# Patient Record
Sex: MALE | Race: WHITE | NOT HISPANIC OR LATINO | Employment: UNEMPLOYED | URBAN - NONMETROPOLITAN AREA
[De-identification: names, ages, dates, MRNs, and addresses within clinical notes are randomized per-mention and may not be internally consistent; named-entity substitution may affect disease eponyms.]

---

## 2017-01-08 ENCOUNTER — HOSPITAL ENCOUNTER (EMERGENCY)
Facility: HOSPITAL | Age: 47
Discharge: HOME | End: 2017-01-08
Admitting: EMERGENCY MEDICINE

## 2017-01-08 VITALS
HEIGHT: 70 IN | HEART RATE: 72 BPM | WEIGHT: 239 LBS | RESPIRATION RATE: 16 BRPM | OXYGEN SATURATION: 99 % | BODY MASS INDEX: 34.22 KG/M2 | SYSTOLIC BLOOD PRESSURE: 162 MMHG | DIASTOLIC BLOOD PRESSURE: 112 MMHG | TEMPERATURE: 98 F

## 2017-01-08 DIAGNOSIS — K08.89 PAIN, DENTAL: Primary | ICD-10-CM

## 2017-01-08 PROCEDURE — 96372 THER/PROPH/DIAG INJ SC/IM: CPT

## 2017-01-08 PROCEDURE — 99283 EMERGENCY DEPT VISIT LOW MDM: CPT

## 2017-01-08 PROCEDURE — 96374 THER/PROPH/DIAG INJ IV PUSH: CPT

## 2017-01-08 PROCEDURE — 25010000002 HYDROMORPHONE PER 4 MG: Performed by: NURSE PRACTITIONER

## 2017-01-08 RX ORDER — HYDROMORPHONE HCL 110MG/55ML
2 PATIENT CONTROLLED ANALGESIA SYRINGE INTRAVENOUS ONCE
Status: DISCONTINUED | OUTPATIENT
Start: 2017-01-08 | End: 2017-01-08

## 2017-01-08 RX ORDER — ONDANSETRON 4 MG/1
4 TABLET, ORALLY DISINTEGRATING ORAL ONCE
Status: COMPLETED | OUTPATIENT
Start: 2017-01-08 | End: 2017-01-08

## 2017-01-08 RX ORDER — IBUPROFEN 600 MG/1
600 TABLET ORAL EVERY 8 HOURS PRN
Qty: 9 TABLET | Refills: 0 | Status: SHIPPED | OUTPATIENT
Start: 2017-01-08 | End: 2017-01-11

## 2017-01-08 RX ORDER — PENICILLIN V POTASSIUM 500 MG/1
500 TABLET ORAL 4 TIMES DAILY
Qty: 40 TABLET | Refills: 0 | Status: SHIPPED | OUTPATIENT
Start: 2017-01-08 | End: 2017-01-18

## 2017-01-08 RX ADMIN — HYDROMORPHONE HYDROCHLORIDE 1 MG: 2 INJECTION, SOLUTION INTRAMUSCULAR; INTRAVENOUS; SUBCUTANEOUS at 14:40

## 2017-01-08 RX ADMIN — ONDANSETRON 4 MG: 4 TABLET, ORALLY DISINTEGRATING ORAL at 14:40

## 2017-01-08 NOTE — ED PROVIDER NOTES
Subjective   HPI Comments: She is a 46-year-old white male who presents ER today with complaint of dental pain.  Patient reports that he is scheduled to have all of his teeth pulled on 01/20/2017.  He states that today while at work a piece of his lower left tooth broke off when he was in an escalator.  Patient reports that he has been having quite a bit of pain since that time.  Patient has been seen in this ER twice in the past 2 weeks for the same complaint.  He was previously on chronic pain medications but no longer has a family physician can prescribe these.  He denies fever. Patient presents ER today for further evaluation.    Patient is a 46 y.o. male presenting with tooth pain.   History provided by:  Patient   used: No    Dental Pain   Location:  Lower  Lower teeth location:  21/LL 1st bicuspid  Quality:  Sharp and aching  Severity:  Moderate  Onset quality:  Sudden  Duration:  1 day  Timing:  Constant  Progression:  Worsening  Chronicity:  Recurrent  Context: dental fracture and poor dentition    Context: not abscess, cap still on, not crown fracture, not dental caries, not enamel fracture, filling intact, not intrusion, not malocclusion, not recent dental surgery and not trauma    Previous work-up:  Dental exam  Relieved by:  Nothing  Worsened by:  Nothing  Ineffective treatments:  None tried  Associated symptoms: no congestion, no difficulty swallowing, no drooling, no facial pain, no facial swelling, no fever, no gum swelling, no headaches, no neck pain, no neck swelling, no oral bleeding, no oral lesions and no trismus    Risk factors: lack of dental care    Risk factors: no alcohol problem, no cancer, no chewing tobacco use, no diabetes, no immunosuppression, no periodontal disease and no smoking        Review of Systems   Constitutional: Negative for fever.   HENT: Negative for congestion, drooling, facial swelling and mouth sores.    Musculoskeletal: Negative for neck pain.    Neurological: Negative for headaches.   All other systems reviewed and are negative.      Past Medical History   Diagnosis Date   • Hypertension    • Stroke        Allergies   Allergen Reactions   • Magnesium-Containing Compounds    • Toradol [Ketorolac Tromethamine]    • Tramadol        Past Surgical History   Procedure Laterality Date   • Neck surgery     • Back surgery         Family History   Problem Relation Age of Onset   • No Known Problems Mother    • No Known Problems Father    • No Known Problems Sister    • No Known Problems Brother    • No Known Problems Son    • No Known Problems Daughter    • No Known Problems Maternal Grandmother    • No Known Problems Maternal Grandfather    • No Known Problems Paternal Grandmother    • No Known Problems Paternal Grandfather    • No Known Problems Cousin    • Rheum arthritis Neg Hx    • Osteoarthritis Neg Hx    • Asthma Neg Hx    • Diabetes Neg Hx    • Heart failure Neg Hx    • Hyperlipidemia Neg Hx    • Hypertension Neg Hx    • Migraines Neg Hx    • Rashes / Skin problems Neg Hx    • Seizures Neg Hx    • Stroke Neg Hx    • Thyroid disease Neg Hx        Social History     Social History   • Marital status:      Spouse name: N/A   • Number of children: N/A   • Years of education: N/A     Social History Main Topics   • Smoking status: Current Every Day Smoker     Packs/day: 0.50   • Smokeless tobacco: None   • Alcohol use No   • Drug use: No   • Sexual activity: Defer     Other Topics Concern   • None     Social History Narrative           Objective   Physical Exam   Constitutional: He is oriented to person, place, and time. He appears well-developed.   HENT:   Head: Normocephalic and atraumatic.   Multiple dental caries noted throughout mouth  Broken tooth noted to tooth #21  Poor denitition noted on exam   Eyes: Conjunctivae are normal. Pupils are equal, round, and reactive to light.   Neck: Normal range of motion. Neck supple.   Cardiovascular: Normal  rate, regular rhythm and normal heart sounds.    Pulmonary/Chest: Effort normal and breath sounds normal.   Abdominal: Soft. Bowel sounds are normal.   Musculoskeletal: Normal range of motion.   Neurological: He is alert and oriented to person, place, and time.   Skin: Skin is warm and dry.   Psychiatric: He has a normal mood and affect.   Nursing note and vitals reviewed.      Procedures         ED Course  ED Course   Comment By Time   We will treat patient with a pain injection today.  He will be given a prescription for penicillin to take at home until he can follow with his dentist.  Lites follow up with dentist in the morning.  Patient is advised that the ER cannot fill chronic pain medication prescriptions.  He will be discharged home with a prescription for ibuprofen which she has previously tolerated in the past.  Patient is advised to return to ER if any new or worsening symptoms.  Discharged home with his wife at this time in stable cond Ligia De Leon, ROSE MARIE 01/08 1442                  MDM  Number of Diagnoses or Management Options  Pain, dental: new and requires workup  Patient Progress  Patient progress: stable      Final diagnoses:   Pain, dental            Ligia De Leon, ROSE MARIE  01/08/17 6062

## 2017-01-08 NOTE — DISCHARGE INSTRUCTIONS
Please f/u with your dentist as scheduled; please call your dentist tomorrow to see if you can get an appt next week  R/t to the ER as needed, sooner if any new or further symptoms  Use medication as prescribed

## 2017-01-20 ENCOUNTER — HOSPITAL ENCOUNTER (EMERGENCY)
Age: 47
Discharge: HOME | End: 2017-01-20
Payer: MEDICAID

## 2017-01-20 VITALS
OXYGEN SATURATION: 99 % | RESPIRATION RATE: 18 BRPM | SYSTOLIC BLOOD PRESSURE: 188 MMHG | HEART RATE: 89 BPM | TEMPERATURE: 97.1 F | DIASTOLIC BLOOD PRESSURE: 119 MMHG

## 2017-01-20 DIAGNOSIS — S02.5XXB OPEN FRACTURE OF TOOTH, INITIAL ENCOUNTER: ICD-10-CM

## 2017-01-20 DIAGNOSIS — K02.9 DENTAL CARIES: ICD-10-CM

## 2017-01-20 DIAGNOSIS — K08.89 PAIN, DENTAL: Primary | ICD-10-CM

## 2017-01-20 PROCEDURE — 99282 EMERGENCY DEPT VISIT SF MDM: CPT | Performed by: PHYSICIAN ASSISTANT

## 2017-01-20 PROCEDURE — 6370000000 HC RX 637 (ALT 250 FOR IP): Performed by: PHYSICIAN ASSISTANT

## 2017-01-20 PROCEDURE — 99282 EMERGENCY DEPT VISIT SF MDM: CPT

## 2017-01-20 RX ORDER — HYDROCODONE BITARTRATE AND ACETAMINOPHEN 7.5; 325 MG/1; MG/1
1 TABLET ORAL ONCE
Status: COMPLETED | OUTPATIENT
Start: 2017-01-20 | End: 2017-01-20

## 2017-01-20 RX ORDER — PENICILLIN V POTASSIUM 500 MG/1
500 TABLET ORAL 4 TIMES DAILY
Qty: 40 TABLET | Refills: 0 | Status: SHIPPED | OUTPATIENT
Start: 2017-01-20 | End: 2017-01-30

## 2017-01-20 RX ADMIN — HYDROCODONE BITARTRATE AND ACETAMINOPHEN 1 TABLET: 7.5; 325 TABLET ORAL at 08:06

## 2017-01-20 ASSESSMENT — PAIN DESCRIPTION - DESCRIPTORS: DESCRIPTORS: DULL;SHARP

## 2017-01-20 ASSESSMENT — PAIN SCALES - GENERAL
PAINLEVEL_OUTOF10: 10
PAINLEVEL_OUTOF10: 10

## 2017-01-20 ASSESSMENT — PAIN DESCRIPTION - LOCATION: LOCATION: MOUTH

## 2017-01-20 ASSESSMENT — PAIN DESCRIPTION - PAIN TYPE: TYPE: ACUTE PAIN

## 2017-01-22 ENCOUNTER — HOSPITAL ENCOUNTER (EMERGENCY)
Age: 47
Discharge: HOME | End: 2017-01-22
Attending: EMERGENCY MEDICINE
Payer: MEDICAID

## 2017-01-22 VITALS
RESPIRATION RATE: 24 BRPM | DIASTOLIC BLOOD PRESSURE: 98 MMHG | TEMPERATURE: 97.7 F | BODY MASS INDEX: 33.32 KG/M2 | HEIGHT: 71 IN | WEIGHT: 238 LBS | HEART RATE: 74 BPM | OXYGEN SATURATION: 98 % | SYSTOLIC BLOOD PRESSURE: 170 MMHG

## 2017-01-22 DIAGNOSIS — K02.9 DENTAL CARIES: Primary | ICD-10-CM

## 2017-01-22 PROCEDURE — 99282 EMERGENCY DEPT VISIT SF MDM: CPT | Performed by: EMERGENCY MEDICINE

## 2017-01-22 PROCEDURE — 99282 EMERGENCY DEPT VISIT SF MDM: CPT

## 2017-01-22 PROCEDURE — 6370000000 HC RX 637 (ALT 250 FOR IP): Performed by: EMERGENCY MEDICINE

## 2017-01-22 RX ORDER — NAPROXEN 500 MG/1
500 TABLET ORAL 2 TIMES DAILY
Qty: 15 TABLET | Refills: 0 | Status: SHIPPED | OUTPATIENT
Start: 2017-01-22

## 2017-01-22 RX ORDER — HYDROCODONE BITARTRATE AND ACETAMINOPHEN 5; 325 MG/1; MG/1
1 TABLET ORAL ONCE
Status: COMPLETED | OUTPATIENT
Start: 2017-01-22 | End: 2017-01-22

## 2017-01-22 RX ADMIN — HYDROCODONE BITARTRATE AND ACETAMINOPHEN 1 TABLET: 5; 325 TABLET ORAL at 08:50

## 2017-01-22 ASSESSMENT — ENCOUNTER SYMPTOMS
SHORTNESS OF BREATH: 0
DIARRHEA: 0
VOMITING: 0
NAUSEA: 0
RHINORRHEA: 0
ABDOMINAL PAIN: 0
SORE THROAT: 0
BACK PAIN: 0

## 2017-01-22 ASSESSMENT — PAIN SCALES - GENERAL
PAINLEVEL_OUTOF10: 9
PAINLEVEL_OUTOF10: 9

## 2017-10-10 ENCOUNTER — HOSPITAL ENCOUNTER (EMERGENCY)
Facility: HOSPITAL | Age: 47
Discharge: HOME | End: 2017-10-10
Admitting: EMERGENCY MEDICINE

## 2017-10-10 ENCOUNTER — APPOINTMENT (OUTPATIENT)
Dept: ULTRASOUND IMAGING | Facility: HOSPITAL | Age: 47
End: 2017-10-10

## 2017-10-10 VITALS
OXYGEN SATURATION: 99 % | BODY MASS INDEX: 35.79 KG/M2 | TEMPERATURE: 98.7 F | RESPIRATION RATE: 14 BRPM | DIASTOLIC BLOOD PRESSURE: 115 MMHG | HEART RATE: 92 BPM | HEIGHT: 70 IN | SYSTOLIC BLOOD PRESSURE: 159 MMHG | WEIGHT: 250 LBS

## 2017-10-10 DIAGNOSIS — L03.116 CELLULITIS OF LEFT LOWER EXTREMITY: Primary | ICD-10-CM

## 2017-10-10 LAB
ALBUMIN SERPL-MCNC: 4.6 G/DL (ref 3.5–5)
ALBUMIN/GLOB SERPL: 1.4 G/DL (ref 1.1–2.5)
ALP SERPL-CCNC: 84 U/L (ref 24–120)
ALT SERPL W P-5'-P-CCNC: 41 U/L (ref 0–54)
ANION GAP SERPL CALCULATED.3IONS-SCNC: 15 MMOL/L (ref 4–13)
AST SERPL-CCNC: 30 U/L (ref 7–45)
BASOPHILS # BLD AUTO: 0.06 10*3/MM3 (ref 0–0.2)
BASOPHILS NFR BLD AUTO: 0.9 % (ref 0–2)
BILIRUB SERPL-MCNC: 0.8 MG/DL (ref 0.1–1)
BUN BLD-MCNC: 20 MG/DL (ref 5–21)
BUN/CREAT SERPL: 19.2 (ref 7–25)
CALCIUM SPEC-SCNC: 9 MG/DL (ref 8.4–10.4)
CHLORIDE SERPL-SCNC: 103 MMOL/L (ref 98–110)
CO2 SERPL-SCNC: 27 MMOL/L (ref 24–31)
CREAT BLD-MCNC: 1.04 MG/DL (ref 0.5–1.4)
D-LACTATE SERPL-SCNC: 1.9 MMOL/L (ref 0.5–2)
DEPRECATED RDW RBC AUTO: 42.8 FL (ref 40–54)
EOSINOPHIL # BLD AUTO: 0.36 10*3/MM3 (ref 0–0.7)
EOSINOPHIL NFR BLD AUTO: 5.2 % (ref 0–4)
ERYTHROCYTE [DISTWIDTH] IN BLOOD BY AUTOMATED COUNT: 13.3 % (ref 12–15)
GFR SERPL CREATININE-BSD FRML MDRD: 77 ML/MIN/1.73
GLOBULIN UR ELPH-MCNC: 3.4 GM/DL
GLUCOSE BLD-MCNC: 120 MG/DL (ref 70–100)
HCT VFR BLD AUTO: 51.7 % (ref 40–52)
HGB BLD-MCNC: 18.1 G/DL (ref 14–18)
IMM GRANULOCYTES # BLD: 0.02 10*3/MM3 (ref 0–0.03)
IMM GRANULOCYTES NFR BLD: 0.3 % (ref 0–5)
LYMPHOCYTES # BLD AUTO: 1.72 10*3/MM3 (ref 0.72–4.86)
LYMPHOCYTES NFR BLD AUTO: 24.9 % (ref 15–45)
MCH RBC QN AUTO: 30.9 PG (ref 28–32)
MCHC RBC AUTO-ENTMCNC: 35 G/DL (ref 33–36)
MCV RBC AUTO: 88.2 FL (ref 82–95)
MONOCYTES # BLD AUTO: 0.82 10*3/MM3 (ref 0.19–1.3)
MONOCYTES NFR BLD AUTO: 11.8 % (ref 4–12)
NEUTROPHILS # BLD AUTO: 3.94 10*3/MM3 (ref 1.87–8.4)
NEUTROPHILS NFR BLD AUTO: 56.9 % (ref 39–78)
PLATELET # BLD AUTO: 211 10*3/MM3 (ref 130–400)
PMV BLD AUTO: 10.5 FL (ref 6–12)
POTASSIUM BLD-SCNC: 4 MMOL/L (ref 3.5–5.3)
PROT SERPL-MCNC: 8 G/DL (ref 6.3–8.7)
RBC # BLD AUTO: 5.86 10*6/MM3 (ref 4.8–5.9)
SODIUM BLD-SCNC: 145 MMOL/L (ref 135–145)
WBC NRBC COR # BLD: 6.92 10*3/MM3 (ref 4.8–10.8)

## 2017-10-10 PROCEDURE — 93971 EXTREMITY STUDY: CPT

## 2017-10-10 PROCEDURE — 83605 ASSAY OF LACTIC ACID: CPT | Performed by: PHYSICIAN ASSISTANT

## 2017-10-10 PROCEDURE — 99283 EMERGENCY DEPT VISIT LOW MDM: CPT

## 2017-10-10 PROCEDURE — 80053 COMPREHEN METABOLIC PANEL: CPT | Performed by: PHYSICIAN ASSISTANT

## 2017-10-10 PROCEDURE — 85025 COMPLETE CBC W/AUTO DIFF WBC: CPT | Performed by: PHYSICIAN ASSISTANT

## 2017-10-10 PROCEDURE — 96365 THER/PROPH/DIAG IV INF INIT: CPT

## 2017-10-10 PROCEDURE — 87040 BLOOD CULTURE FOR BACTERIA: CPT | Performed by: PHYSICIAN ASSISTANT

## 2017-10-10 RX ORDER — DICLOFENAC SODIUM 75 MG/1
75 TABLET, DELAYED RELEASE ORAL 2 TIMES DAILY
Qty: 14 TABLET | Refills: 0 | Status: SHIPPED | OUTPATIENT
Start: 2017-10-10 | End: 2021-09-02

## 2017-10-10 RX ORDER — CLINDAMYCIN PHOSPHATE 600 MG/50ML
600 INJECTION INTRAVENOUS ONCE
Status: COMPLETED | OUTPATIENT
Start: 2017-10-10 | End: 2017-10-10

## 2017-10-10 RX ORDER — CLINDAMYCIN PHOSPHATE 150 MG/ML
600 INJECTION, SOLUTION INTRAVENOUS ONCE
Status: DISCONTINUED | OUTPATIENT
Start: 2017-10-10 | End: 2017-10-10

## 2017-10-10 RX ORDER — CEPHALEXIN 500 MG/1
500 CAPSULE ORAL 3 TIMES DAILY
Qty: 30 CAPSULE | Refills: 0 | Status: SHIPPED | OUTPATIENT
Start: 2017-10-10 | End: 2021-09-02

## 2017-10-10 RX ADMIN — CLINDAMYCIN PHOSPHATE 600 MG: 12 INJECTION, SOLUTION INTRAVENOUS at 16:37

## 2017-10-10 NOTE — ED NOTES
Spoke to pharmacy. States they just sent patients medication in the tube system.     Latasha Ambrosio RN  10/10/17 6281

## 2017-10-10 NOTE — ED PROVIDER NOTES
Subjective   History of Present Illness  47-year-old male presents chief complaint of left lower leg pain past 2 days.  The patient reports he was having a spider bite on his leg 2 days ago and has since experienced worsening redness and swelling and pain in his left lower leg.  Patient reports his pain clearly reproducible palpation and with bearing weight.  He reports there is also associated warmth from the leg.  Patient reports alleviating factors include rest.  Denies fevers chills chest pain shortness of breath nausea vomiting diarrhea.  He has not seen another provider for this yet.  Review of Systems   All other systems reviewed and are negative.      Past Medical History:   Diagnosis Date   • Hypertension    • Stroke        Allergies   Allergen Reactions   • Magnesium-Containing Compounds    • Toradol [Ketorolac Tromethamine]    • Tramadol        Past Surgical History:   Procedure Laterality Date   • BACK SURGERY     • NECK SURGERY         Family History   Problem Relation Age of Onset   • No Known Problems Mother    • No Known Problems Father    • No Known Problems Sister    • No Known Problems Brother    • No Known Problems Son    • No Known Problems Daughter    • No Known Problems Maternal Grandmother    • No Known Problems Maternal Grandfather    • No Known Problems Paternal Grandmother    • No Known Problems Paternal Grandfather    • No Known Problems Cousin    • Rheum arthritis Neg Hx    • Osteoarthritis Neg Hx    • Asthma Neg Hx    • Diabetes Neg Hx    • Heart failure Neg Hx    • Hyperlipidemia Neg Hx    • Hypertension Neg Hx    • Migraines Neg Hx    • Rashes / Skin problems Neg Hx    • Seizures Neg Hx    • Stroke Neg Hx    • Thyroid disease Neg Hx        Social History     Social History   • Marital status:      Spouse name: N/A   • Number of children: N/A   • Years of education: N/A     Social History Main Topics   • Smoking status: Current Every Day Smoker     Packs/day: 0.50   • Smokeless  tobacco: None   • Alcohol use No   • Drug use: No   • Sexual activity: Defer     Other Topics Concern   • None     Social History Narrative           Objective   Physical Exam   Constitutional: He is oriented to person, place, and time. He appears well-developed and well-nourished.   HENT:   Head: Normocephalic.   Eyes: Pupils are equal, round, and reactive to light.   Neck: Normal range of motion.   Cardiovascular: Normal rate and regular rhythm.    Pulmonary/Chest: Effort normal.   Abdominal: Soft.   Musculoskeletal: Normal range of motion.   Neurological: He is alert and oriented to person, place, and time.   Skin: Skin is warm.   16x16 area of cellulitis left lower leg   Psychiatric: He has a normal mood and affect. His behavior is normal.   Nursing note and vitals reviewed.      Procedures         ED Course  ED Course                  MDM    Final diagnoses:   Cellulitis of left lower extremity            Alejandro Haynes PA-C  10/10/17 3711

## 2017-10-15 LAB
BACTERIA SPEC AEROBE CULT: NORMAL
BACTERIA SPEC AEROBE CULT: NORMAL

## 2017-11-14 ENCOUNTER — HOSPITAL ENCOUNTER (EMERGENCY)
Facility: HOSPITAL | Age: 47
Discharge: HOME | End: 2017-11-14
Admitting: EMERGENCY MEDICINE

## 2017-11-14 ENCOUNTER — APPOINTMENT (OUTPATIENT)
Dept: GENERAL RADIOLOGY | Facility: HOSPITAL | Age: 47
End: 2017-11-14

## 2017-11-14 VITALS
RESPIRATION RATE: 18 BRPM | SYSTOLIC BLOOD PRESSURE: 150 MMHG | HEART RATE: 86 BPM | BODY MASS INDEX: 33.6 KG/M2 | DIASTOLIC BLOOD PRESSURE: 84 MMHG | HEIGHT: 71 IN | WEIGHT: 240 LBS | TEMPERATURE: 98.2 F | OXYGEN SATURATION: 98 %

## 2017-11-14 DIAGNOSIS — S61.412A LACERATION OF LEFT HAND WITHOUT FOREIGN BODY, INITIAL ENCOUNTER: Primary | ICD-10-CM

## 2017-11-14 PROCEDURE — 73130 X-RAY EXAM OF HAND: CPT

## 2017-11-14 PROCEDURE — 99283 EMERGENCY DEPT VISIT LOW MDM: CPT

## 2017-11-14 RX ORDER — LIDOCAINE HYDROCHLORIDE 10 MG/ML
10 INJECTION, SOLUTION INFILTRATION; PERINEURAL ONCE
Status: DISCONTINUED | OUTPATIENT
Start: 2017-11-14 | End: 2017-11-14 | Stop reason: HOSPADM

## 2017-11-14 NOTE — ED PROVIDER NOTES
Subjective   Patient is a 47 y.o. male presenting with skin laceration.   Laceration     Patient is a 47-year-old  man with chief complaint of left hand laceration.  The patient is right-hand dominant.  He was barehanded when this happened.  He was opening up a tool that was encased.  He is using a utility knife when it slipped and accidentally stabbed him in his left hand.  The patient believes that the depth of the blade Was about 2-3 cm.  He Denies Any Loss of Sensation or Motion.  He Denies Being Diabetic.  He Is up to date with his Tetanus Vaccination.  He Denies Any Other Injury.  He came to the ER immediately.    Review of Systems   All other systems reviewed and are negative.      Past Medical History:   Diagnosis Date   • Hypertension    • Stroke        Allergies   Allergen Reactions   • Magnesium-Containing Compounds    • Toradol [Ketorolac Tromethamine]    • Tramadol        Past Surgical History:   Procedure Laterality Date   • BACK SURGERY     • NECK SURGERY         Family History   Problem Relation Age of Onset   • No Known Problems Mother    • No Known Problems Father    • No Known Problems Sister    • No Known Problems Brother    • No Known Problems Son    • No Known Problems Daughter    • No Known Problems Maternal Grandmother    • No Known Problems Maternal Grandfather    • No Known Problems Paternal Grandmother    • No Known Problems Paternal Grandfather    • No Known Problems Cousin    • Rheum arthritis Neg Hx    • Osteoarthritis Neg Hx    • Asthma Neg Hx    • Diabetes Neg Hx    • Heart failure Neg Hx    • Hyperlipidemia Neg Hx    • Hypertension Neg Hx    • Migraines Neg Hx    • Rashes / Skin problems Neg Hx    • Seizures Neg Hx    • Stroke Neg Hx    • Thyroid disease Neg Hx        Social History     Social History   • Marital status:      Spouse name: N/A   • Number of children: N/A   • Years of education: N/A     Social History Main Topics   • Smoking status: Current Every Day  Smoker     Packs/day: 0.50   • Smokeless tobacco: Not on file   • Alcohol use No   • Drug use: No   • Sexual activity: Defer     Other Topics Concern   • Not on file     Social History Narrative       Prior to Admission medications    Medication Sig Start Date End Date Taking? Authorizing Provider   aspirin 81 MG EC tablet Take 81 mg by mouth Daily.    Historical Provider, MD   cephalexin (KEFLEX) 500 MG capsule Take 1 capsule by mouth 3 (Three) Times a Day. 10/10/17   Alejandro Haynes PA-C   diclofenac (VOLTAREN) 75 MG EC tablet Take 1 tablet by mouth 2 (Two) Times a Day. 10/10/17   Alejandro Haynes PA-C   gentamicin (GARAMYCIN) 0.3 % ophthalmic solution Administer 1 drop into the left eye every 4 (four) hours. 9/23/16   Violeta March DO   HYDROcodone-acetaminophen (NORCO) 5-325 MG per tablet Take 1 tablet by mouth every 4 (four) hours as needed for moderate pain (4-6) for up to 10 doses. 9/23/16   Vaibhav Resendez MD   lisinopril (PRINIVIL,ZESTRIL) 20 MG tablet Take 40 mg by mouth daily     Historical Provider, MD   MethylPREDNISolone (MEDROL, MARIA T,) 4 MG tablet Take as directed on package instructions. 11/6/16   ROSE MARIE More   ondansetron (ZOFRAN) 4 MG tablet Take 1 tablet by mouth every 4 (four) hours as needed for nausea or vomiting. 9/23/16   Vaibhav Resendez MD   oxyCODONE-acetaminophen (PERCOCET)  MG per tablet Take 1 tablet by mouth Every 6 (Six) Hours As Needed for moderate pain (4-6). 12/13/16   Jolynn Jonas MD   oxyCODONE-acetaminophen (PERCOCET) 7.5-325 MG per tablet Take 1 tablet by mouth Every 4 (Four) Hours As Needed for moderate pain (4-6). 11/6/16   ROSE MARIE More   sulfamethoxazole-trimethoprim (BACTRIM DS,SEPTRA DS) 800-160 MG per tablet Take 1 tablet by mouth 2 (Two) Times a Day. 12/27/16   Boyd Cornejo Jr., MD   tiZANidine (ZANAFLEX) 4 MG tablet Take 1 tablet by mouth At Night As Needed for muscle spasms. 11/6/16   Amy Rivera  "Romeo, APRN       Medications   lidocaine (XYLOCAINE) 1 % injection 10 mL (not administered)       /84  Pulse 86  Resp 18  Ht 71\" (180.3 cm)  Wt 240 lb (109 kg)  SpO2 98%  BMI 33.47 kg/m2      Objective   Physical Exam   Constitutional: He is oriented to person, place, and time. He appears well-developed and well-nourished.  Non-toxic appearance. No distress.   HENT:   Head: Normocephalic and atraumatic.   Nose: Nose normal.   Mouth/Throat: Uvula is midline, oropharynx is clear and moist and mucous membranes are normal.   Eyes: Conjunctivae, EOM and lids are normal. Pupils are equal, round, and reactive to light. Lids are everted and swept, no foreign bodies found.   Neck: Trachea normal, normal range of motion, full passive range of motion without pain and phonation normal. Neck supple. Normal carotid pulses and no JVD present. Carotid bruit is not present. No rigidity.   Cardiovascular: Normal rate, regular rhythm, normal heart sounds, intact distal pulses and normal pulses.    Pulmonary/Chest: Effort normal and breath sounds normal. No stridor. No apnea and no tachypnea. No respiratory distress.   Abdominal: Soft. Normal appearance, normal aorta and bowel sounds are normal. There is no hepatosplenomegaly. There is no tenderness. No hernia.   Musculoskeletal: He exhibits tenderness and deformity. He exhibits no edema.   Small 1cm laceration at the dorsal aspect of proximal left 5th metacarpal site. Patient has full ROM and normal sensation.        Vascular Status -  His exam exhibits right foot vasculature normal. His exam exhibits left foot vasculature normal.  Neurological: He is alert and oriented to person, place, and time. He has normal strength and normal reflexes. He displays normal reflexes. No cranial nerve deficit or sensory deficit. Coordination and gait normal. GCS eye subscore is 4. GCS verbal subscore is 5. GCS motor subscore is 6.   Skin: Skin is warm, dry and intact. He is not " diaphoretic. No pallor.   Psychiatric: He has a normal mood and affect. His speech is normal and behavior is normal.   Nursing note and vitals reviewed.      Laceration Repair  Date/Time: 11/14/2017 5:36 PM  Performed by: TESHA FAY  Authorized by: TESHA FAY     Consent:     Consent obtained:  Verbal    Consent given by:  Patient  Anesthesia (see MAR for exact dosages):     Anesthesia method:  Local infiltration    Local anesthetic:  Lidocaine 1% w/o epi  Laceration details:     Location:  Hand    Hand location:  L hand, dorsum    Length (cm):  1    Depth (mm):  3  Repair type:     Repair type:  Simple  Pre-procedure details:     Preparation:  Patient was prepped and draped in usual sterile fashion and imaging obtained to evaluate for foreign bodies  Exploration:     Hemostasis achieved with:  Direct pressure    Wound exploration: wound explored through full range of motion and entire depth of wound probed and visualized      Wound extent: no areolar tissue violation noted, no fascia violation noted, no foreign bodies/material noted, no muscle damage noted, no nerve damage noted, no tendon damage noted, no underlying fracture noted and no vascular damage noted      Contaminated: no    Treatment:     Area cleansed with:  Betadine    Amount of cleaning:  Standard    Irrigation solution:  Sterile saline    Irrigation method:  Syringe    Visualized foreign bodies/material removed: no    Skin repair:     Repair method:  Sutures    Suture size:  5-0    Suture material:  Nylon    Suture technique:  Simple interrupted    Number of sutures:  3  Approximation:     Approximation:  Close    Vermilion border: well-aligned    Post-procedure details:     Dressing:  Antibiotic ointment    Patient tolerance of procedure:  Tolerated well, no immediate complications             Lab Results (last 24 hours)     ** No results found for the last 24 hours. **          No results found.    ED Course  ED Course           MDM    Final diagnoses:   Laceration of left hand without foreign body, initial encounter          betsy-RADHAMES Reid  11/14/17 5794

## 2021-09-02 ENCOUNTER — OFFICE VISIT (OUTPATIENT)
Dept: FAMILY MEDICINE CLINIC | Facility: CLINIC | Age: 51
End: 2021-09-02

## 2021-09-02 VITALS
HEIGHT: 71 IN | DIASTOLIC BLOOD PRESSURE: 110 MMHG | WEIGHT: 258.4 LBS | BODY MASS INDEX: 36.18 KG/M2 | TEMPERATURE: 97 F | SYSTOLIC BLOOD PRESSURE: 188 MMHG | RESPIRATION RATE: 16 BRPM | HEART RATE: 110 BPM | OXYGEN SATURATION: 97 %

## 2021-09-02 DIAGNOSIS — F17.200 SMOKER: ICD-10-CM

## 2021-09-02 DIAGNOSIS — E66.01 CLASS 2 SEVERE OBESITY DUE TO EXCESS CALORIES WITH SERIOUS COMORBIDITY AND BODY MASS INDEX (BMI) OF 36.0 TO 36.9 IN ADULT (HCC): ICD-10-CM

## 2021-09-02 DIAGNOSIS — Z12.5 PROSTATE CANCER SCREENING: ICD-10-CM

## 2021-09-02 DIAGNOSIS — Z86.73 HISTORY OF CVA (CEREBROVASCULAR ACCIDENT): ICD-10-CM

## 2021-09-02 DIAGNOSIS — Z11.4 SCREENING FOR HIV (HUMAN IMMUNODEFICIENCY VIRUS): ICD-10-CM

## 2021-09-02 DIAGNOSIS — I10 ESSENTIAL HYPERTENSION: Primary | ICD-10-CM

## 2021-09-02 DIAGNOSIS — Z11.59 NEED FOR HEPATITIS C SCREENING TEST: ICD-10-CM

## 2021-09-02 DIAGNOSIS — Z13.220 LIPID SCREENING: ICD-10-CM

## 2021-09-02 PROCEDURE — 99204 OFFICE O/P NEW MOD 45 MIN: CPT | Performed by: FAMILY MEDICINE

## 2021-09-02 RX ORDER — AMLODIPINE BESYLATE 5 MG/1
5 TABLET ORAL DAILY
Qty: 30 TABLET | Refills: 2 | Status: SHIPPED | OUTPATIENT
Start: 2021-09-02 | End: 2021-09-16

## 2021-09-02 RX ORDER — LISINOPRIL 40 MG/1
40 TABLET ORAL DAILY
Qty: 30 TABLET | Refills: 2 | Status: SHIPPED | OUTPATIENT
Start: 2021-09-02 | End: 2021-11-01 | Stop reason: SDUPTHER

## 2021-09-02 RX ORDER — CLONIDINE HYDROCHLORIDE 0.2 MG/1
0.2 TABLET ORAL 2 TIMES DAILY
COMMUNITY
End: 2021-09-02

## 2021-09-02 RX ORDER — HYDROCHLOROTHIAZIDE 12.5 MG/1
12.5 TABLET ORAL DAILY
Qty: 30 TABLET | Refills: 2 | Status: SHIPPED | OUTPATIENT
Start: 2021-09-02 | End: 2021-09-16 | Stop reason: SDUPTHER

## 2021-09-02 NOTE — PATIENT INSTRUCTIONS
"Hypertension, Adult  High blood pressure (hypertension) is when the force of blood pumping through the arteries is too strong. The arteries are the blood vessels that carry blood from the heart throughout the body. Hypertension forces the heart to work harder to pump blood and may cause arteries to become narrow or stiff. Untreated or uncontrolled hypertension can cause a heart attack, heart failure, a stroke, kidney disease, and other problems.  A blood pressure reading consists of a higher number over a lower number. Ideally, your blood pressure should be below 120/80. The first (\"top\") number is called the systolic pressure. It is a measure of the pressure in your arteries as your heart beats. The second (\"bottom\") number is called the diastolic pressure. It is a measure of the pressure in your arteries as the heart relaxes.  What are the causes?  The exact cause of this condition is not known. There are some conditions that result in or are related to high blood pressure.  What increases the risk?  Some risk factors for high blood pressure are under your control. The following factors may make you more likely to develop this condition:  · Smoking.  · Having type 2 diabetes mellitus, high cholesterol, or both.  · Not getting enough exercise or physical activity.  · Being overweight.  · Having too much fat, sugar, calories, or salt (sodium) in your diet.  · Drinking too much alcohol.  Some risk factors for high blood pressure may be difficult or impossible to change. Some of these factors include:  · Having chronic kidney disease.  · Having a family history of high blood pressure.  · Age. Risk increases with age.  · Race. You may be at higher risk if you are .  · Gender. Men are at higher risk than women before age 45. After age 65, women are at higher risk than men.  · Having obstructive sleep apnea.  · Stress.  What are the signs or symptoms?  High blood pressure may not cause symptoms. Very high " blood pressure (hypertensive crisis) may cause:  · Headache.  · Anxiety.  · Shortness of breath.  · Nosebleed.  · Nausea and vomiting.  · Vision changes.  · Severe chest pain.  · Seizures.  How is this diagnosed?  This condition is diagnosed by measuring your blood pressure while you are seated, with your arm resting on a flat surface, your legs uncrossed, and your feet flat on the floor. The cuff of the blood pressure monitor will be placed directly against the skin of your upper arm at the level of your heart. It should be measured at least twice using the same arm. Certain conditions can cause a difference in blood pressure between your right and left arms.  Certain factors can cause blood pressure readings to be lower or higher than normal for a short period of time:  · When your blood pressure is higher when you are in a health care provider's office than when you are at home, this is called white coat hypertension. Most people with this condition do not need medicines.  · When your blood pressure is higher at home than when you are in a health care provider's office, this is called masked hypertension. Most people with this condition may need medicines to control blood pressure.  If you have a high blood pressure reading during one visit or you have normal blood pressure with other risk factors, you may be asked to:  · Return on a different day to have your blood pressure checked again.  · Monitor your blood pressure at home for 1 week or longer.  If you are diagnosed with hypertension, you may have other blood or imaging tests to help your health care provider understand your overall risk for other conditions.  How is this treated?  This condition is treated by making healthy lifestyle changes, such as eating healthy foods, exercising more, and reducing your alcohol intake. Your health care provider may prescribe medicine if lifestyle changes are not enough to get your blood pressure under control, and  if:  · Your systolic blood pressure is above 130.  · Your diastolic blood pressure is above 80.  Your personal target blood pressure may vary depending on your medical conditions, your age, and other factors.  Follow these instructions at home:  Eating and drinking    · Eat a diet that is high in fiber and potassium, and low in sodium, added sugar, and fat. An example eating plan is called the DASH (Dietary Approaches to Stop Hypertension) diet. To eat this way:  ? Eat plenty of fresh fruits and vegetables. Try to fill one half of your plate at each meal with fruits and vegetables.  ? Eat whole grains, such as whole-wheat pasta, brown rice, or whole-grain bread. Fill about one fourth of your plate with whole grains.  ? Eat or drink low-fat dairy products, such as skim milk or low-fat yogurt.  ? Avoid fatty cuts of meat, processed or cured meats, and poultry with skin. Fill about one fourth of your plate with lean proteins, such as fish, chicken without skin, beans, eggs, or tofu.  ? Avoid pre-made and processed foods. These tend to be higher in sodium, added sugar, and fat.  · Reduce your daily sodium intake. Most people with hypertension should eat less than 1,500 mg of sodium a day.  · Do not drink alcohol if:  ? Your health care provider tells you not to drink.  ? You are pregnant, may be pregnant, or are planning to become pregnant.  · If you drink alcohol:  ? Limit how much you use to:  § 0-1 drink a day for women.  § 0-2 drinks a day for men.  ? Be aware of how much alcohol is in your drink. In the [place] one drink equals one 12 oz bottle of beer (355 mL), one 5 oz glass of wine (148 mL), or one 1½ oz glass of hard liquor (44 mL).  Lifestyle    · Work with your health care provider to maintain a healthy body weight or to lose weight. Ask what an ideal weight is for you.  · Get at least 30 minutes of exercise most days of the week. Activities may include walking, swimming, or biking.  · Include exercise to  strengthen your muscles (resistance exercise), such as Pilates or lifting weights, as part of your weekly exercise routine. Try to do these types of exercises for 30 minutes at least 3 days a week.  · Do not use any products that contain nicotine or tobacco, such as cigarettes, e-cigarettes, and chewing tobacco. If you need help quitting, ask your health care provider.  · Monitor your blood pressure at home as told by your health care provider.  · Keep all follow-up visits as told by your health care provider. This is important.  Medicines  · Take over-the-counter and prescription medicines only as told by your health care provider. Follow directions carefully. Blood pressure medicines must be taken as prescribed.  · Do not skip doses of blood pressure medicine. Doing this puts you at risk for problems and can make the medicine less effective.  · Ask your health care provider about side effects or reactions to medicines that you should watch for.  Contact a health care provider if you:  · Think you are having a reaction to a medicine you are taking.  · Have headaches that keep coming back (recurring).  · Feel dizzy.  · Have swelling in your ankles.  · Have trouble with your vision.  Get help right away if you:  · Develop a severe headache or confusion.  · Have unusual weakness or numbness.  · Feel faint.  · Have severe pain in your chest or abdomen.  · Vomit repeatedly.  · Have trouble breathing.  Summary  · Hypertension is when the force of blood pumping through your arteries is too strong. If this condition is not controlled, it may put you at risk for serious complications.  · Your personal target blood pressure may vary depending on your medical conditions, your age, and other factors. For most people, a normal blood pressure is less than 120/80.  · Hypertension is treated with lifestyle changes, medicines, or a combination of both. Lifestyle changes include losing weight, eating a healthy, low-sodium diet,  "exercising more, and limiting alcohol.  This information is not intended to replace advice given to you by your health care provider. Make sure you discuss any questions you have with your health care provider.  Document Revised: 08/28/2019 Document Reviewed: 08/28/2019  [employer] Patient Education © 2021 [employer]      https://www.nhlbi.nih.gov/files/docs/public/heart/dash_brief.pdf\">   DASH Eating Plan  DASH stands for Dietary Approaches to Stop Hypertension. The DASH eating plan is a healthy eating plan that has been shown to:  · Reduce high blood pressure (hypertension).  · Reduce your risk for type 2 diabetes, heart disease, and stroke.  · Help with weight loss.  What are tips for following this plan?  Reading food labels  · Check food labels for the amount of salt (sodium) per serving. Choose foods with less than 5 percent of the Daily Value of sodium. Generally, foods with less than 300 milligrams (mg) of sodium per serving fit into this eating plan.  · To find whole grains, look for the word \"whole\" as the first word in the ingredient list.  Shopping  · Buy products labeled as \"low-sodium\" or \"no salt added.\"  · Buy fresh foods. Avoid canned foods and pre-made or frozen meals.  Cooking  · Avoid adding salt when cooking. Use salt-free seasonings or herbs instead of table salt or sea salt. Check with your health care provider or pharmacist before using salt substitutes.  · Do not syed foods. Cook foods using healthy methods such as baking, boiling, grilling, roasting, and broiling instead.  · Cook with heart-healthy oils, such as olive, canola, avocado, soybean, or sunflower oil.  Meal planning    · Eat a balanced diet that includes:  ? 4 or more servings of fruits and 4 or more servings of vegetables each day. Try to fill one-half of your plate with fruits and vegetables.  ? 6-8 servings of whole grains each day.  ? Less than 6 oz (170 g) of lean meat, poultry, or fish each day. A 3-oz (85-g) serving of " meat is about the same size as a deck of cards. One egg equals 1 oz (28 g).  ? 2-3 servings of low-fat dairy each day. One serving is 1 cup (237 mL).  ? 1 serving of nuts, seeds, or beans 5 times each week.  ? 2-3 servings of heart-healthy fats. Healthy fats called omega-3 fatty acids are found in foods such as walnuts, flaxseeds, fortified milks, and eggs. These fats are also found in cold-water fish, such as sardines, salmon, and mackerel.  · Limit how much you eat of:  ? Canned or prepackaged foods.  ? Food that is high in trans fat, such as some fried foods.  ? Food that is high in saturated fat, such as fatty meat.  ? Desserts and other sweets, sugary drinks, and other foods with added sugar.  ? Full-fat dairy products.  · Do not salt foods before eating.  · Do not eat more than 4 egg yolks a week.  · Try to eat at least 2 vegetarian meals a week.  · Eat more home-cooked food and less restaurant, buffet, and fast food.  Lifestyle  · When eating at a restaurant, ask that your food be prepared with less salt or no salt, if possible.  · If you drink alcohol:  ? Limit how much you use to:  § 0-1 drink a day for women who are not pregnant.  § 0-2 drinks a day for men.  ? Be aware of how much alcohol is in your drink. In the [place] one drink equals one 12 oz bottle of beer (355 mL), one 5 oz glass of wine (148 mL), or one 1½ oz glass of hard liquor (44 mL).  General information  · Avoid eating more than 2,300 mg of salt a day. If you have hypertension, you may need to reduce your sodium intake to 1,500 mg a day.  · Work with your health care provider to maintain a healthy body weight or to lose weight. Ask what an ideal weight is for you.  · Get at least 30 minutes of exercise that causes your heart to beat faster (aerobic exercise) most days of the week. Activities may include walking, swimming, or biking.  · Work with your health care provider or dietitian to adjust your eating plan to your individual calorie  needs.  What foods should I eat?  Fruits  All fresh, dried, or frozen fruit. Canned fruit in natural juice (without added sugar).  Vegetables  Fresh or frozen vegetables (raw, steamed, roasted, or grilled). Low-sodium or reduced-sodium tomato and vegetable juice. Low-sodium or reduced-sodium tomato sauce and tomato paste. Low-sodium or reduced-sodium canned vegetables.  Grains  Whole-grain or whole-wheat bread. Whole-grain or whole-wheat pasta. Brown rice. Oatmeal. Quinoa. Bulgur. Whole-grain and low-sodium cereals. Rosy bread. Low-fat, low-sodium crackers. Whole-wheat flour tortillas.  Meats and other proteins  Skinless chicken or turkey. Ground chicken or turkey. Pork with fat trimmed off. Fish and seafood. Egg whites. Dried beans, peas, or lentils. Unsalted nuts, nut butters, and seeds. Unsalted canned beans. Lean cuts of beef with fat trimmed off. Low-sodium, lean precooked or cured meat, such as sausages or meat loaves.  Dairy  Low-fat (1%) or fat-free (skim) milk. Reduced-fat, low-fat, or fat-free cheeses. Nonfat, low-sodium ricotta or cottage cheese. Low-fat or nonfat yogurt. Low-fat, low-sodium cheese.  Fats and oils  Soft margarine without trans fats. Vegetable oil. Reduced-fat, low-fat, or light mayonnaise and salad dressings (reduced-sodium). Canola, safflower, olive, avocado, soybean, and sunflower oils. Avocado.  Seasonings and condiments  Herbs. Spices. Seasoning mixes without salt.  Other foods  Unsalted popcorn and pretzels. Fat-free sweets.  The items listed above may not be a complete list of foods and beverages you can eat. Contact a dietitian for more information.  What foods should I avoid?  Fruits  Canned fruit in a light or heavy syrup. Fried fruit. Fruit in cream or butter sauce.  Vegetables  Creamed or fried vegetables. Vegetables in a cheese sauce. Regular canned vegetables (not low-sodium or reduced-sodium). Regular canned tomato sauce and paste (not low-sodium or reduced-sodium). Regular  tomato and vegetable juice (not low-sodium or reduced-sodium). Pickles. Olives.  Grains  Baked goods made with fat, such as croissants, muffins, or some breads. Dry pasta or rice meal packs.  Meats and other proteins  Fatty cuts of meat. Ribs. Fried meat. Harris. Bologna, salami, and other precooked or cured meats, such as sausages or meat loaves. Fat from the back of a pig (fatback). Bratwurst. Salted nuts and seeds. Canned beans with added salt. Canned or smoked fish. Whole eggs or egg yolks. Chicken or turkey with skin.  Dairy  Whole or 2% milk, cream, and half-and-half. Whole or full-fat cream cheese. Whole-fat or sweetened yogurt. Full-fat cheese. Nondairy creamers. Whipped toppings. Processed cheese and cheese spreads.  Fats and oils  Butter. Stick margarine. Lard. Shortening. Ghee. Harris fat. Tropical oils, such as coconut, palm kernel, or palm oil.  Seasonings and condiments  Onion salt, garlic salt, seasoned salt, table salt, and sea salt. Worcestershire sauce. Tartar sauce. Barbecue sauce. Teriyaki sauce. Soy sauce, including reduced-sodium. Steak sauce. Canned and packaged gravies. Fish sauce. Oyster sauce. Cocktail sauce. Store-bought horseradish. Ketchup. Mustard. Meat flavorings and tenderizers. Bouillon cubes. Hot sauces. Pre-made or packaged marinades. Pre-made or packaged taco seasonings. Relishes. Regular salad dressings.  Other foods  Salted popcorn and pretzels.  The items listed above may not be a complete list of foods and beverages you should avoid. Contact a dietitian for more information.  Where to find more information  · National Heart, Lung, and Blood [place]: www.nhlbi.nih.gov  · American Heart Association: www.heart.org  · Academy of Nutrition and Dietetics: www.eatright.org  · National Kidney Foundation: www.kidney.org  Summary  · The DASH eating plan is a healthy eating plan that has been shown to reduce high blood pressure (hypertension). It may also reduce your risk for type 2  diabetes, heart disease, and stroke.  · When on the DASH eating plan, aim to eat more fresh fruits and vegetables, whole grains, lean proteins, low-fat dairy, and heart-healthy fats.  · With the DASH eating plan, you should limit salt (sodium) intake to 2,300 mg a day. If you have hypertension, you may need to reduce your sodium intake to 1,500 mg a day.  · Work with your health care provider or dietitian to adjust your eating plan to your individual calorie needs.  This information is not intended to replace advice given to you by your health care provider. Make sure you discuss any questions you have with your health care provider.  Document Revised: 11/20/2020 Document Reviewed: 11/20/2020  [employer] Patient Education © 2021 [employer]      Obesity, Adult  Obesity is the condition of having too much total body fat. Being overweight or obese means that your weight is greater than what is considered healthy for your body size. Obesity is determined by a measurement called BMI. BMI is an estimate of body fat and is calculated from height and weight. For adults, a BMI of 30 or higher is considered obese.  Obesity can lead to other health concerns and major illnesses, including:  · Stroke.  · Coronary artery disease (CAD).  · Type 2 diabetes.  · Some types of cancer, including cancers of the colon, breast, uterus, and gallbladder.  · Osteoarthritis.  · High blood pressure (hypertension).  · High cholesterol.  · Sleep apnea.  · Gallbladder stones.  · Infertility problems.  What are the causes?  Common causes of this condition include:  · Eating daily meals that are high in calories, sugar, and fat.  · Being born with genes that may make you more likely to become obese.  · Having a medical condition that causes obesity, including:  ? Hypothyroidism.  ? Polycystic ovarian syndrome (PCOS).  ? Binge-eating disorder.  ? Cushing syndrome.  · Taking certain medicines, such as steroids, antidepressants, and seizure  medicines.  · Not being physically active (sedentary lifestyle).  · Not getting enough sleep.  · Drinking high amounts of sugar-sweetened beverages, such as soft drinks.  What increases the risk?  The following factors may make you more likely to develop this condition:  · Having a family history of obesity.  · Being a woman of  descent.  · Being a man of  descent.  · Living in an area with limited access to:  ? [place], recreation centers, or sidewalks.  ? Healthy food choices, such as grocery stores and farmers' markets.  What are the signs or symptoms?  The main sign of this condition is having too much body fat.  How is this diagnosed?  This condition is diagnosed based on:  · Your BMI. If you are an adult with a BMI of 30 or higher, you are considered obese.  · Your waist circumference. This measures the distance around your waistline.  · Your skinfold thickness. Your health care provider may gently pinch a fold of your skin and measure it.  You may have other tests to check for underlying conditions.  How is this treated?  Treatment for this condition often includes changing your lifestyle. Treatment may include some or all of the following:  · Dietary changes. This may include developing a healthy meal plan.  · Regular physical activity. This may include activity that causes your heart to beat faster (aerobic exercise) and strength training. Work with your health care provider to design an exercise program that works for you.  · Medicine to help you lose weight if you are unable to lose 1 pound a week after 6 weeks of healthy eating and more physical activity.  · Treating conditions that cause the obesity (underlying conditions).  · Surgery. Surgical options may include gastric banding and gastric bypass. Surgery may be done if:  ? Other treatments have not helped to improve your condition.  ? You have a BMI of 40 or higher.  ? You have life-threatening health problems related to  obesity.  Follow these instructions at home:  Eating and drinking    · Follow recommendations from your health care provider about what you eat and drink. Your health care provider may advise you to:  ? Limit fast food, sweets, and processed snack foods.  ? Choose low-fat options, such as low-fat milk instead of whole milk.  ? Eat 5 or more servings of fruits or vegetables every day.  ? Eat at home more often. This gives you more control over what you eat.  ? Choose healthy foods when you eat out.  ? Learn to read food labels. This will help you understand how much food is considered 1 serving.  ? Learn what a healthy serving size is.  ? Keep low-fat snacks available.  ? Limit sugary drinks, such as soda, fruit juice, sweetened iced tea, and flavored milk.  · Drink enough water to keep your urine pale yellow.  · Do not follow a fad diet. Fad diets can be unhealthy and even dangerous.  Physical activity  · Exercise regularly, as told by your health care provider.  ? Most adults should get up to 150 minutes of moderate-intensity exercise every week.  ? Ask your health care provider what types of exercise are safe for you and how often you should exercise.  · Warm up and stretch before being active.  · Cool down and stretch after being active.  · Rest between periods of activity.  Lifestyle  · Work with your health care provider and a dietitian to set a weight-loss goal that is healthy and reasonable for you.  · Limit your screen time.  · Find ways to reward yourself that do not involve food.  · Do not drink alcohol if:  ? Your health care provider tells you not to drink.  ? You are pregnant, may be pregnant, or are planning to become pregnant.  · If you drink alcohol:  ? Limit how much you use to:  § 0-1 drink a day for women.  § 0-2 drinks a day for men.  ? Be aware of how much alcohol is in your drink. In the [place] one drink equals one 12 oz bottle of beer (355 mL), one 5 oz glass of wine (148 mL), or one 1½ oz  glass of hard liquor (44 mL).  General instructions  · Keep a weight-loss journal to keep track of the food you eat and how much exercise you get.  · Take over-the-counter and prescription medicines only as told by your health care provider.  · Take vitamins and supplements only as told by your health care provider.  · Consider joining a support group. Your health care provider may be able to recommend a support group.  · Keep all follow-up visits as told by your health care provider. This is important.  Contact a health care provider if:  · You are unable to meet your weight loss goal after 6 weeks of dietary and lifestyle changes.  Get help right away if you are having:  · Trouble breathing.  · Suicidal thoughts or behaviors.  Summary  · Obesity is the condition of having too much total body fat.  · Being overweight or obese means that your weight is greater than what is considered healthy for your body size.  · Work with your health care provider and a dietitian to set a weight-loss goal that is healthy and reasonable for you.  · Exercise regularly, as told by your health care provider. Ask your health care provider what types of exercise are safe for you and how often you should exercise.  This information is not intended to replace advice given to you by your health care provider. Make sure you discuss any questions you have with your health care provider.  Document Revised: 08/22/2019 Document Reviewed: 08/22/2019  [employer] Patient Education © 2021 [employer]

## 2021-09-02 NOTE — PROGRESS NOTES
"Subjective cc: est care for HTN   Roscoe Frias is a 51 y.o. male with h/o CVA, tob abuse, HTN, obesity who presents to est care.   4 CVA and 2 TIA over past 5 years - poor memory - these happened while he was in shelter - reports that he wasn't treated but that he was [facility] this is what had happened. With his first CVA he did have an MRI. Reviewed old records from [facility] confirming CVA in [id number]  Doesn't have a neurologist     HTN: uncontrolled - has been out of medication for the past 3 days   Obesity: uncontrolled     No prior CAD   No known cancer     Smoker: daily, 1/2 ppd x 7 years off and on   No other tob products   No alcohol use   No illicit drug use   Not currently working - trying to get disability     Colonoscopy: 3-4 years ago - normal - done at [facility]   No known colon cancer screening.   No known prostate cancer   No HIV/hep C screening since out of penitentiary - has been out for 3 months.     History of Present Illness     The following portions of the patient's history were reviewed and updated as appropriate: allergies, current medications, past family history, past medical history, past social history, past surgical history and problem list.        Review of Systems   All other systems reviewed and are negative.      Objective   Blood pressure (!) 188/110, pulse 110, temperature 97 °F (36.1 °C), temperature source Infrared, resp. rate 16, height 180.3 cm (71\"), weight 117 kg (258 lb 6.4 oz), SpO2 97 %.  Physical Exam  Vitals and nursing note reviewed.   Constitutional:       General: He is not in acute distress.     Appearance: He is well-developed. He is obese. He is not diaphoretic.   HENT:      Head: Normocephalic and atraumatic.      Right Ear: External ear normal.      Left Ear: External ear normal.      Nose: Nose normal.   Eyes:      General:         Right eye: No discharge.         Left eye: No discharge.      Conjunctiva/sclera: Conjunctivae normal.   Neck: "      Thyroid: No thyromegaly.      Trachea: No tracheal deviation.   Cardiovascular:      Rate and Rhythm: Regular rhythm. Tachycardia present.      Heart sounds: Normal heart sounds.   Pulmonary:      Effort: Pulmonary effort is normal. No respiratory distress.      Breath sounds: Normal breath sounds. No stridor. No wheezing.   Chest:      Chest wall: No tenderness.   Abdominal:      General: There is no distension.      Palpations: Abdomen is soft.      Tenderness: There is no abdominal tenderness.   Musculoskeletal:         General: Normal range of motion.      Cervical back: Normal range of motion.      Right lower leg: No edema.      Left lower leg: No edema.   Lymphadenopathy:      Cervical: No cervical adenopathy.   Skin:     General: Skin is warm and dry.   Neurological:      Mental Status: He is alert and oriented to person, place, and time.      Motor: No abnormal muscle tone.      Coordination: Coordination normal.   Psychiatric:         Behavior: Behavior normal.         Thought Content: Thought content normal.         Judgment: Judgment normal.         Assessment/Plan   Problems Addressed this Visit     None      Visit Diagnoses     Essential hypertension    -  Primary    Relevant Medications    lisinopril (PRINIVIL,ZESTRIL) 40 MG tablet    amLODIPine (Norvasc) 5 MG tablet    hydroCHLOROthiazide (HYDRODIURIL) 12.5 MG tablet    Other Relevant Orders    CBC (No Diff)    Comprehensive Metabolic Panel    History of CVA (cerebrovascular accident)        Relevant Orders    MRI Brain Without Contrast    Ambulatory Referral to Neurology    Need for hepatitis C screening test        Relevant Orders    Hepatitis C Antibody    Screening for HIV (human immunodeficiency virus)        Relevant Orders    HIV-1/O/2 Ag/Ab w Reflex    Lipid screening        Relevant Orders    Lipid Panel    Prostate cancer screening        Relevant Orders    PSA Screen    Class 2 severe obesity due to excess calories with serious  comorbidity and body mass index (BMI) of 36.0 to 36.9 in adult (CMS/[facility])        Smoker          Diagnoses       Codes Comments    Essential hypertension    -  Primary ICD-10-CM: I10  ICD-9-CM: 401.9     History of CVA (cerebrovascular accident)     ICD-10-CM: Z86.73  ICD-9-CM: V12.54     Need for hepatitis C screening test     ICD-10-CM: Z11.59  ICD-9-CM: V73.89     Screening for HIV (human immunodeficiency virus)     ICD-10-CM: Z11.4  ICD-9-CM: V73.89     Lipid screening     ICD-10-CM: Z13.220  ICD-9-CM: V77.91     Prostate cancer screening     ICD-10-CM: Z12.5  ICD-9-CM: V76.44     Class 2 severe obesity due to excess calories with serious comorbidity and body mass index (BMI) of 36.0 to 36.9 in adult (CMS/[facility])     ICD-10-CM: E66.01, Z68.36  ICD-9-CM: 278.01, V85.36     Smoker     ICD-10-CM: F17.200  ICD-9-CM: 305.1         PLAN:     #1 HTN: new, uncontrolled, has been out of medicaiton, will adjust his lisinopril down to 40mg once daily (prev on 80mg daily), will add norvasc and HCTZ - will likely need to be increased but dont want to bottom him out too quickly, recheck in 2 weeks, will get CBC and MCP, DASH diet info given     #2 obiesty: Patient's Body mass index is 36.04 kg/m². indicating that he is morbidly obese (BMI > 40 or > 35 with obesity - related health condition). Obesity-related health conditions include the following: hypertension. Obesity is newly identified. BMI is is above average; BMI management plan is completed. We discussed portion control and increasing exercise.    #3 smoker: Roscoe Frias  reports that he has been smoking. He started smoking about 6 years ago. He has been smoking about 0.50 packs per day. He has never used smokeless tobacco.. I have educated him on the risk of diseases from using tobacco products such as cancer, COPD and heart disease.     I advised him to quit and he is not willing to quit.    I spent 3  minutes counseling the patient.     #4 h/o CVA: new, needs  further eval, will get BP under control first, will get MRI, ref to neuro, will need ASA and statin - will get labs first           This document has been electronically signed by Barbara Waters MD on September 2, 2021 11:00 CDT

## 2021-09-16 ENCOUNTER — OFFICE VISIT (OUTPATIENT)
Dept: FAMILY MEDICINE CLINIC | Facility: CLINIC | Age: 51
End: 2021-09-16

## 2021-09-16 ENCOUNTER — TELEPHONE (OUTPATIENT)
Dept: FAMILY MEDICINE CLINIC | Facility: CLINIC | Age: 51
End: 2021-09-16

## 2021-09-16 VITALS
TEMPERATURE: 97.3 F | OXYGEN SATURATION: 98 % | DIASTOLIC BLOOD PRESSURE: 100 MMHG | HEIGHT: 71 IN | BODY MASS INDEX: 36.34 KG/M2 | SYSTOLIC BLOOD PRESSURE: 154 MMHG | HEART RATE: 70 BPM | RESPIRATION RATE: 16 BRPM | WEIGHT: 259.6 LBS

## 2021-09-16 DIAGNOSIS — I10 ESSENTIAL HYPERTENSION: ICD-10-CM

## 2021-09-16 PROCEDURE — 99213 OFFICE O/P EST LOW 20 MIN: CPT | Performed by: FAMILY MEDICINE

## 2021-09-16 RX ORDER — HYDROCHLOROTHIAZIDE 50 MG/1
50 TABLET ORAL DAILY
Qty: 30 TABLET | Refills: 0 | Status: SHIPPED | OUTPATIENT
Start: 2021-09-16 | End: 2021-11-01 | Stop reason: SINTOL

## 2021-09-16 NOTE — PROGRESS NOTES
"Subjective  cc: HTN   Roscoe Frias is a 51 y.o. male who presents with complaint of not feeling well and BP still being elevated.   Taking lisinopril at night - waking up about 3-4 AM - grumpy - takes norvasc and HCTZ at that time - goes back to sleep - still grumpy and doesn't feel well in day, takes lisinopril in evening and will feel better until bedtime. BP has continued to be elevated.   He has been on lisinopril in the past - the other meds are new     History of Present Illness     The following portions of the patient's history were reviewed and updated as appropriate: allergies, current medications, past family history, past medical history, past social history, past surgical history and problem list.        Review of Systems   Psychiatric/Behavioral: Positive for agitation, behavioral problems and sleep disturbance.   All other systems reviewed and are negative.      Objective   Blood pressure 154/100, pulse 70, temperature 97.3 °F (36.3 °C), temperature source Infrared, resp. rate 16, height 180.3 cm (71\"), weight 118 kg (259 lb 9.6 oz), SpO2 98 %.  Physical Exam  Vitals and nursing note reviewed.   Constitutional:       General: He is not in acute distress.     Appearance: Normal appearance. He is obese. He is not toxic-appearing.   HENT:      Head: Normocephalic and atraumatic.   Cardiovascular:      Rate and Rhythm: Normal rate and regular rhythm.      Pulses: Normal pulses.   Pulmonary:      Effort: Pulmonary effort is normal. No respiratory distress.      Breath sounds: Normal breath sounds. No wheezing.   Skin:     General: Skin is warm and dry.   Neurological:      Mental Status: He is alert.   Psychiatric:         Mood and Affect: Mood normal.         Behavior: Behavior normal.         Thought Content: Thought content normal.         Judgment: Judgment normal.         Assessment/Plan   Problems Addressed this Visit     None      Visit Diagnoses     Essential hypertension        Relevant " Medications    hydroCHLOROthiazide (HYDRODIURIL) 50 MG tablet      Diagnoses       Codes Comments    Essential hypertension     ICD-10-CM: I10  ICD-9-CM: 401.9         PLAN:     #1 HTN: chronic, uncontrolled, DC norvasc, will continue lisinopril, continue HCTZ and increase dose, DASH diet, monitor BP, call with any concerns, recheck in 2 weeks           This document has been electronically signed by Barbara Waters MD on September 16, 2021 10:44 CDT

## 2021-09-16 NOTE — PATIENT INSTRUCTIONS
"Hypertension, Adult  High blood pressure (hypertension) is when the force of blood pumping through the arteries is too strong. The arteries are the blood vessels that carry blood from the heart throughout the body. Hypertension forces the heart to work harder to pump blood and may cause arteries to become narrow or stiff. Untreated or uncontrolled hypertension can cause a heart attack, heart failure, a stroke, kidney disease, and other problems.  A blood pressure reading consists of a higher number over a lower number. Ideally, your blood pressure should be below 120/80. The first (\"top\") number is called the systolic pressure. It is a measure of the pressure in your arteries as your heart beats. The second (\"bottom\") number is called the diastolic pressure. It is a measure of the pressure in your arteries as the heart relaxes.  What are the causes?  The exact cause of this condition is not known. There are some conditions that result in or are related to high blood pressure.  What increases the risk?  Some risk factors for high blood pressure are under your control. The following factors may make you more likely to develop this condition:  · Smoking.  · Having type 2 diabetes mellitus, high cholesterol, or both.  · Not getting enough exercise or physical activity.  · Being overweight.  · Having too much fat, sugar, calories, or salt (sodium) in your diet.  · Drinking too much alcohol.  Some risk factors for high blood pressure may be difficult or impossible to change. Some of these factors include:  · Having chronic kidney disease.  · Having a family history of high blood pressure.  · Age. Risk increases with age.  · Race. You may be at higher risk if you are .  · Gender. Men are at higher risk than women before age 45. After age 65, women are at higher risk than men.  · Having obstructive sleep apnea.  · Stress.  What are the signs or symptoms?  High blood pressure may not cause symptoms. Very high " blood pressure (hypertensive crisis) may cause:  · Headache.  · Anxiety.  · Shortness of breath.  · Nosebleed.  · Nausea and vomiting.  · Vision changes.  · Severe chest pain.  · Seizures.  How is this diagnosed?  This condition is diagnosed by measuring your blood pressure while you are seated, with your arm resting on a flat surface, your legs uncrossed, and your feet flat on the floor. The cuff of the blood pressure monitor will be placed directly against the skin of your upper arm at the level of your heart. It should be measured at least twice using the same arm. Certain conditions can cause a difference in blood pressure between your right and left arms.  Certain factors can cause blood pressure readings to be lower or higher than normal for a short period of time:  · When your blood pressure is higher when you are in a health care provider's office than when you are at home, this is called white coat hypertension. Most people with this condition do not need medicines.  · When your blood pressure is higher at home than when you are in a health care provider's office, this is called masked hypertension. Most people with this condition may need medicines to control blood pressure.  If you have a high blood pressure reading during one visit or you have normal blood pressure with other risk factors, you may be asked to:  · Return on a different day to have your blood pressure checked again.  · Monitor your blood pressure at home for 1 week or longer.  If you are diagnosed with hypertension, you may have other blood or imaging tests to help your health care provider understand your overall risk for other conditions.  How is this treated?  This condition is treated by making healthy lifestyle changes, such as eating healthy foods, exercising more, and reducing your alcohol intake. Your health care provider may prescribe medicine if lifestyle changes are not enough to get your blood pressure under control, and  if:  · Your systolic blood pressure is above 130.  · Your diastolic blood pressure is above 80.  Your personal target blood pressure may vary depending on your medical conditions, your age, and other factors.  Follow these instructions at home:  Eating and drinking    · Eat a diet that is high in fiber and potassium, and low in sodium, added sugar, and fat. An example eating plan is called the DASH (Dietary Approaches to Stop Hypertension) diet. To eat this way:  ? Eat plenty of fresh fruits and vegetables. Try to fill one half of your plate at each meal with fruits and vegetables.  ? Eat whole grains, such as whole-wheat pasta, brown rice, or whole-grain bread. Fill about one fourth of your plate with whole grains.  ? Eat or drink low-fat dairy products, such as skim milk or low-fat yogurt.  ? Avoid fatty cuts of meat, processed or cured meats, and poultry with skin. Fill about one fourth of your plate with lean proteins, such as fish, chicken without skin, beans, eggs, or tofu.  ? Avoid pre-made and processed foods. These tend to be higher in sodium, added sugar, and fat.  · Reduce your daily sodium intake. Most people with hypertension should eat less than 1,500 mg of sodium a day.  · Do not drink alcohol if:  ? Your health care provider tells you not to drink.  ? You are pregnant, may be pregnant, or are planning to become pregnant.  · If you drink alcohol:  ? Limit how much you use to:  § 0-1 drink a day for women.  § 0-2 drinks a day for men.  ? Be aware of how much alcohol is in your drink. In the [place] one drink equals one 12 oz bottle of beer (355 mL), one 5 oz glass of wine (148 mL), or one 1½ oz glass of hard liquor (44 mL).    Lifestyle    · Work with your health care provider to maintain a healthy body weight or to lose weight. Ask what an ideal weight is for you.  · Get at least 30 minutes of exercise most days of the week. Activities may include walking, swimming, or biking.  · Include exercise to  strengthen your muscles (resistance exercise), such as Pilates or lifting weights, as part of your weekly exercise routine. Try to do these types of exercises for 30 minutes at least 3 days a week.  · Do not use any products that contain nicotine or tobacco, such as cigarettes, e-cigarettes, and chewing tobacco. If you need help quitting, ask your health care provider.  · Monitor your blood pressure at home as told by your health care provider.  · Keep all follow-up visits as told by your health care provider. This is important.    Medicines  · Take over-the-counter and prescription medicines only as told by your health care provider. Follow directions carefully. Blood pressure medicines must be taken as prescribed.  · Do not skip doses of blood pressure medicine. Doing this puts you at risk for problems and can make the medicine less effective.  · Ask your health care provider about side effects or reactions to medicines that you should watch for.  Contact a health care provider if you:  · Think you are having a reaction to a medicine you are taking.  · Have headaches that keep coming back (recurring).  · Feel dizzy.  · Have swelling in your ankles.  · Have trouble with your vision.  Get help right away if you:  · Develop a severe headache or confusion.  · Have unusual weakness or numbness.  · Feel faint.  · Have severe pain in your chest or abdomen.  · Vomit repeatedly.  · Have trouble breathing.  Summary  · Hypertension is when the force of blood pumping through your arteries is too strong. If this condition is not controlled, it may put you at risk for serious complications.  · Your personal target blood pressure may vary depending on your medical conditions, your age, and other factors. For most people, a normal blood pressure is less than 120/80.  · Hypertension is treated with lifestyle changes, medicines, or a combination of both. Lifestyle changes include losing weight, eating a healthy, low-sodium diet,  "exercising more, and limiting alcohol.  This information is not intended to replace advice given to you by your health care provider. Make sure you discuss any questions you have with your health care provider.  Document Revised: 08/28/2019 Document Reviewed: 08/28/2019  [employer] Patient Education © 2021 [employer]      https://www.nhlbi.nih.gov/files/docs/public/heart/dash_brief.pdf\">   DASH Eating Plan  DASH stands for Dietary Approaches to Stop Hypertension. The DASH eating plan is a healthy eating plan that has been shown to:  · Reduce high blood pressure (hypertension).  · Reduce your risk for type 2 diabetes, heart disease, and stroke.  · Help with weight loss.  What are tips for following this plan?  Reading food labels  · Check food labels for the amount of salt (sodium) per serving. Choose foods with less than 5 percent of the Daily Value of sodium. Generally, foods with less than 300 milligrams (mg) of sodium per serving fit into this eating plan.  · To find whole grains, look for the word \"whole\" as the first word in the ingredient list.  Shopping  · Buy products labeled as \"low-sodium\" or \"no salt added.\"  · Buy fresh foods. Avoid canned foods and pre-made or frozen meals.  Cooking  · Avoid adding salt when cooking. Use salt-free seasonings or herbs instead of table salt or sea salt. Check with your health care provider or pharmacist before using salt substitutes.  · Do not syed foods. Cook foods using healthy methods such as baking, boiling, grilling, roasting, and broiling instead.  · Cook with heart-healthy oils, such as olive, canola, avocado, soybean, or sunflower oil.  Meal planning    · Eat a balanced diet that includes:  ? 4 or more servings of fruits and 4 or more servings of vegetables each day. Try to fill one-half of your plate with fruits and vegetables.  ? 6-8 servings of whole grains each day.  ? Less than 6 oz (170 g) of lean meat, poultry, or fish each day. A 3-oz (85-g) serving of " meat is about the same size as a deck of cards. One egg equals 1 oz (28 g).  ? 2-3 servings of low-fat dairy each day. One serving is 1 cup (237 mL).  ? 1 serving of nuts, seeds, or beans 5 times each week.  ? 2-3 servings of heart-healthy fats. Healthy fats called omega-3 fatty acids are found in foods such as walnuts, flaxseeds, fortified milks, and eggs. These fats are also found in cold-water fish, such as sardines, salmon, and mackerel.  · Limit how much you eat of:  ? Canned or prepackaged foods.  ? Food that is high in trans fat, such as some fried foods.  ? Food that is high in saturated fat, such as fatty meat.  ? Desserts and other sweets, sugary drinks, and other foods with added sugar.  ? Full-fat dairy products.  · Do not salt foods before eating.  · Do not eat more than 4 egg yolks a week.  · Try to eat at least 2 vegetarian meals a week.  · Eat more home-cooked food and less restaurant, buffet, and fast food.    Lifestyle  · When eating at a restaurant, ask that your food be prepared with less salt or no salt, if possible.  · If you drink alcohol:  ? Limit how much you use to:  § 0-1 drink a day for women who are not pregnant.  § 0-2 drinks a day for men.  ? Be aware of how much alcohol is in your drink. In the [place] one drink equals one 12 oz bottle of beer (355 mL), one 5 oz glass of wine (148 mL), or one 1½ oz glass of hard liquor (44 mL).  General information  · Avoid eating more than 2,300 mg of salt a day. If you have hypertension, you may need to reduce your sodium intake to 1,500 mg a day.  · Work with your health care provider to maintain a healthy body weight or to lose weight. Ask what an ideal weight is for you.  · Get at least 30 minutes of exercise that causes your heart to beat faster (aerobic exercise) most days of the week. Activities may include walking, swimming, or biking.  · Work with your health care provider or dietitian to adjust your eating plan to your individual calorie  needs.  What foods should I eat?  Fruits  All fresh, dried, or frozen fruit. Canned fruit in natural juice (without added sugar).  Vegetables  Fresh or frozen vegetables (raw, steamed, roasted, or grilled). Low-sodium or reduced-sodium tomato and vegetable juice. Low-sodium or reduced-sodium tomato sauce and tomato paste. Low-sodium or reduced-sodium canned vegetables.  Grains  Whole-grain or whole-wheat bread. Whole-grain or whole-wheat pasta. Brown rice. Oatmeal. Quinoa. Bulgur. Whole-grain and low-sodium cereals. Rosy bread. Low-fat, low-sodium crackers. Whole-wheat flour tortillas.  Meats and other proteins  Skinless chicken or turkey. Ground chicken or turkey. Pork with fat trimmed off. Fish and seafood. Egg whites. Dried beans, peas, or lentils. Unsalted nuts, nut butters, and seeds. Unsalted canned beans. Lean cuts of beef with fat trimmed off. Low-sodium, lean precooked or cured meat, such as sausages or meat loaves.  Dairy  Low-fat (1%) or fat-free (skim) milk. Reduced-fat, low-fat, or fat-free cheeses. Nonfat, low-sodium ricotta or cottage cheese. Low-fat or nonfat yogurt. Low-fat, low-sodium cheese.  Fats and oils  Soft margarine without trans fats. Vegetable oil. Reduced-fat, low-fat, or light mayonnaise and salad dressings (reduced-sodium). Canola, safflower, olive, avocado, soybean, and sunflower oils. Avocado.  Seasonings and condiments  Herbs. Spices. Seasoning mixes without salt.  Other foods  Unsalted popcorn and pretzels. Fat-free sweets.  The items listed above may not be a complete list of foods and beverages you can eat. Contact a dietitian for more information.  What foods should I avoid?  Fruits  Canned fruit in a light or heavy syrup. Fried fruit. Fruit in cream or butter sauce.  Vegetables  Creamed or fried vegetables. Vegetables in a cheese sauce. Regular canned vegetables (not low-sodium or reduced-sodium). Regular canned tomato sauce and paste (not low-sodium or reduced-sodium). Regular  tomato and vegetable juice (not low-sodium or reduced-sodium). Pickles. Olives.  Grains  Baked goods made with fat, such as croissants, muffins, or some breads. Dry pasta or rice meal packs.  Meats and other proteins  Fatty cuts of meat. Ribs. Fried meat. Harris. Bologna, salami, and other precooked or cured meats, such as sausages or meat loaves. Fat from the back of a pig (fatback). Bratwurst. Salted nuts and seeds. Canned beans with added salt. Canned or smoked fish. Whole eggs or egg yolks. Chicken or turkey with skin.  Dairy  Whole or 2% milk, cream, and half-and-half. Whole or full-fat cream cheese. Whole-fat or sweetened yogurt. Full-fat cheese. Nondairy creamers. Whipped toppings. Processed cheese and cheese spreads.  Fats and oils  Butter. Stick margarine. Lard. Shortening. Ghee. Harris fat. Tropical oils, such as coconut, palm kernel, or palm oil.  Seasonings and condiments  Onion salt, garlic salt, seasoned salt, table salt, and sea salt. Worcestershire sauce. Tartar sauce. Barbecue sauce. Teriyaki sauce. Soy sauce, including reduced-sodium. Steak sauce. Canned and packaged gravies. Fish sauce. Oyster sauce. Cocktail sauce. Store-bought horseradish. Ketchup. Mustard. Meat flavorings and tenderizers. Bouillon cubes. Hot sauces. Pre-made or packaged marinades. Pre-made or packaged taco seasonings. Relishes. Regular salad dressings.  Other foods  Salted popcorn and pretzels.  The items listed above may not be a complete list of foods and beverages you should avoid. Contact a dietitian for more information.  Where to find more information  · National Heart, Lung, and Blood [place]: www.nhlbi.nih.gov  · American Heart Association: www.heart.org  · Academy of Nutrition and Dietetics: www.eatright.org  · National Kidney Foundation: www.kidney.org  Summary  · The DASH eating plan is a healthy eating plan that has been shown to reduce high blood pressure (hypertension). It may also reduce your risk for type 2  diabetes, heart disease, and stroke.  · When on the DASH eating plan, aim to eat more fresh fruits and vegetables, whole grains, lean proteins, low-fat dairy, and heart-healthy fats.  · With the DASH eating plan, you should limit salt (sodium) intake to 2,300 mg a day. If you have hypertension, you may need to reduce your sodium intake to 1,500 mg a day.  · Work with your health care provider or dietitian to adjust your eating plan to your individual calorie needs.  This information is not intended to replace advice given to you by your health care provider. Make sure you discuss any questions you have with your health care provider.  Document Revised: 11/20/2020 Document Reviewed: 11/20/2020  [employer] Patient Education © 2021 [employer]      Obesity, Adult  Obesity is the condition of having too much total body fat. Being overweight or obese means that your weight is greater than what is considered healthy for your body size. Obesity is determined by a measurement called BMI. BMI is an estimate of body fat and is calculated from height and weight. For adults, a BMI of 30 or higher is considered obese.  Obesity can lead to other health concerns and major illnesses, including:  · Stroke.  · Coronary artery disease (CAD).  · Type 2 diabetes.  · Some types of cancer, including cancers of the colon, breast, uterus, and gallbladder.  · Osteoarthritis.  · High blood pressure (hypertension).  · High cholesterol.  · Sleep apnea.  · Gallbladder stones.  · Infertility problems.  What are the causes?  Common causes of this condition include:  · Eating daily meals that are high in calories, sugar, and fat.  · Being born with genes that may make you more likely to become obese.  · Having a medical condition that causes obesity, including:  ? Hypothyroidism.  ? Polycystic ovarian syndrome (PCOS).  ? Binge-eating disorder.  ? Cushing syndrome.  · Taking certain medicines, such as steroids, antidepressants, and seizure  medicines.  · Not being physically active (sedentary lifestyle).  · Not getting enough sleep.  · Drinking high amounts of sugar-sweetened beverages, such as soft drinks.  What increases the risk?  The following factors may make you more likely to develop this condition:  · Having a family history of obesity.  · Being a woman of  descent.  · Being a man of  descent.  · Living in an area with limited access to:  ? [place], recreation centers, or sidewalks.  ? Healthy food choices, such as grocery stores and farmers' markets.  What are the signs or symptoms?  The main sign of this condition is having too much body fat.  How is this diagnosed?  This condition is diagnosed based on:  · Your BMI. If you are an adult with a BMI of 30 or higher, you are considered obese.  · Your waist circumference. This measures the distance around your waistline.  · Your skinfold thickness. Your health care provider may gently pinch a fold of your skin and measure it.  You may have other tests to check for underlying conditions.  How is this treated?  Treatment for this condition often includes changing your lifestyle. Treatment may include some or all of the following:  · Dietary changes. This may include developing a healthy meal plan.  · Regular physical activity. This may include activity that causes your heart to beat faster (aerobic exercise) and strength training. Work with your health care provider to design an exercise program that works for you.  · Medicine to help you lose weight if you are unable to lose 1 pound a week after 6 weeks of healthy eating and more physical activity.  · Treating conditions that cause the obesity (underlying conditions).  · Surgery. Surgical options may include gastric banding and gastric bypass. Surgery may be done if:  ? Other treatments have not helped to improve your condition.  ? You have a BMI of 40 or higher.  ? You have life-threatening health problems related to  obesity.  Follow these instructions at home:  Eating and drinking    · Follow recommendations from your health care provider about what you eat and drink. Your health care provider may advise you to:  ? Limit fast food, sweets, and processed snack foods.  ? Choose low-fat options, such as low-fat milk instead of whole milk.  ? Eat 5 or more servings of fruits or vegetables every day.  ? Eat at home more often. This gives you more control over what you eat.  ? Choose healthy foods when you eat out.  ? Learn to read food labels. This will help you understand how much food is considered 1 serving.  ? Learn what a healthy serving size is.  ? Keep low-fat snacks available.  ? Limit sugary drinks, such as soda, fruit juice, sweetened iced tea, and flavored milk.  · Drink enough water to keep your urine pale yellow.  · Do not follow a fad diet. Fad diets can be unhealthy and even dangerous.    Physical activity  · Exercise regularly, as told by your health care provider.  ? Most adults should get up to 150 minutes of moderate-intensity exercise every week.  ? Ask your health care provider what types of exercise are safe for you and how often you should exercise.  · Warm up and stretch before being active.  · Cool down and stretch after being active.  · Rest between periods of activity.  Lifestyle  · Work with your health care provider and a dietitian to set a weight-loss goal that is healthy and reasonable for you.  · Limit your screen time.  · Find ways to reward yourself that do not involve food.  · Do not drink alcohol if:  ? Your health care provider tells you not to drink.  ? You are pregnant, may be pregnant, or are planning to become pregnant.  · If you drink alcohol:  ? Limit how much you use to:  § 0-1 drink a day for women.  § 0-2 drinks a day for men.  ? Be aware of how much alcohol is in your drink. In the [place] one drink equals one 12 oz bottle of beer (355 mL), one 5 oz glass of wine (148 mL), or one 1½ oz  glass of hard liquor (44 mL).  General instructions  · Keep a weight-loss journal to keep track of the food you eat and how much exercise you get.  · Take over-the-counter and prescription medicines only as told by your health care provider.  · Take vitamins and supplements only as told by your health care provider.  · Consider joining a support group. Your health care provider may be able to recommend a support group.  · Keep all follow-up visits as told by your health care provider. This is important.  Contact a health care provider if:  · You are unable to meet your weight loss goal after 6 weeks of dietary and lifestyle changes.  Get help right away if you are having:  · Trouble breathing.  · Suicidal thoughts or behaviors.  Summary  · Obesity is the condition of having too much total body fat.  · Being overweight or obese means that your weight is greater than what is considered healthy for your body size.  · Work with your health care provider and a dietitian to set a weight-loss goal that is healthy and reasonable for you.  · Exercise regularly, as told by your health care provider. Ask your health care provider what types of exercise are safe for you and how often you should exercise.  This information is not intended to replace advice given to you by your health care provider. Make sure you discuss any questions you have with your health care provider.  Document Revised: 08/22/2019 Document Reviewed: 08/22/2019  [employer] Patient Education © 2021 [employer]

## 2021-09-16 NOTE — TELEPHONE ENCOUNTER
----- Message from Barbara Waters MD sent at 9/16/2021  4:30 PM CDT -----  Regarding: RE: MRI  Please do thank you  ----- Message -----  From: Zaira Evangelista MA  Sent: 9/16/2021   4:27 PM CDT  To: Barbara Waters MD  Subject: MRI                                              Approved and ready for initial scheduling- [facility] so [facility] can [facility].  ----- Message -----  From: Barbara Waters MD  Sent: 9/16/2021  10:20 AM CDT  To: [facility]    Pt asking about getting his MRI scheduled - can we check on if it has been approved

## 2021-09-17 LAB
ALBUMIN SERPL-MCNC: 4.7 G/DL (ref 3.5–5.2)
ALBUMIN/GLOB SERPL: 1.6 G/DL
ALP SERPL-CCNC: 63 U/L (ref 39–117)
ALT SERPL-CCNC: 49 U/L (ref 1–41)
AST SERPL-CCNC: 34 U/L (ref 1–40)
BILIRUB SERPL-MCNC: 0.5 MG/DL (ref 0–1.2)
BUN SERPL-MCNC: 19 MG/DL (ref 6–20)
BUN/CREAT SERPL: 18.8 (ref 7–25)
CALCIUM SERPL-MCNC: 9.6 MG/DL (ref 8.6–10.5)
CHLORIDE SERPL-SCNC: 102 MMOL/L (ref 98–107)
CHOLEST SERPL-MCNC: 220 MG/DL (ref 0–200)
CO2 SERPL-SCNC: 23 MMOL/L (ref 22–29)
CREAT SERPL-MCNC: 1.01 MG/DL (ref 0.76–1.27)
ERYTHROCYTE [DISTWIDTH] IN BLOOD BY AUTOMATED COUNT: 13.5 % (ref 12.3–15.4)
GLOBULIN SER CALC-MCNC: 2.9 GM/DL
GLUCOSE SERPL-MCNC: 124 MG/DL (ref 65–99)
HCT VFR BLD AUTO: 59.4 % (ref 37.5–51)
HCV AB S/CO SERPL IA: <0.1 S/CO RATIO (ref 0–0.9)
HDLC SERPL-MCNC: 47 MG/DL (ref 40–60)
HGB BLD-MCNC: 19.5 G/DL (ref 13–17.7)
HIV 1+2 AB+HIV1 P24 AG SERPL QL IA: NON REACTIVE
LDLC SERPL CALC-MCNC: 159 MG/DL (ref 0–100)
MCH RBC QN AUTO: 29.9 PG (ref 26.6–33)
MCHC RBC AUTO-ENTMCNC: 32.8 G/DL (ref 31.5–35.7)
MCV RBC AUTO: 91 FL (ref 79–97)
PLATELET # BLD AUTO: 264 10*3/MM3 (ref 140–450)
POTASSIUM SERPL-SCNC: 4.1 MMOL/L (ref 3.5–5.2)
PROT SERPL-MCNC: 7.6 G/DL (ref 6–8.5)
PSA SERPL-MCNC: 0.56 NG/ML (ref 0–4)
RBC # BLD AUTO: 6.53 10*6/MM3 (ref 4.14–5.8)
SODIUM SERPL-SCNC: 138 MMOL/L (ref 136–145)
TRIGL SERPL-MCNC: 81 MG/DL (ref 0–150)
VLDLC SERPL CALC-MCNC: 14 MG/DL (ref 5–40)
WBC # BLD AUTO: 7.16 10*3/MM3 (ref 3.4–10.8)

## 2021-11-01 ENCOUNTER — OFFICE VISIT (OUTPATIENT)
Dept: FAMILY MEDICINE CLINIC | Facility: CLINIC | Age: 51
End: 2021-11-01

## 2021-11-01 VITALS
HEART RATE: 79 BPM | TEMPERATURE: 98.6 F | WEIGHT: 275.8 LBS | DIASTOLIC BLOOD PRESSURE: 114 MMHG | SYSTOLIC BLOOD PRESSURE: 176 MMHG | BODY MASS INDEX: 38.61 KG/M2 | RESPIRATION RATE: 20 BRPM | HEIGHT: 71 IN | OXYGEN SATURATION: 98 %

## 2021-11-01 DIAGNOSIS — E66.01 CLASS 2 SEVERE OBESITY DUE TO EXCESS CALORIES WITH SERIOUS COMORBIDITY AND BODY MASS INDEX (BMI) OF 38.0 TO 38.9 IN ADULT (HCC): ICD-10-CM

## 2021-11-01 DIAGNOSIS — G47.10 HYPERSOMNIA: ICD-10-CM

## 2021-11-01 DIAGNOSIS — Z86.73 HISTORY OF CVA IN ADULTHOOD: ICD-10-CM

## 2021-11-01 DIAGNOSIS — E78.2 MIXED HYPERLIPIDEMIA: ICD-10-CM

## 2021-11-01 DIAGNOSIS — I10 ESSENTIAL HYPERTENSION: Primary | ICD-10-CM

## 2021-11-01 DIAGNOSIS — F17.200 SMOKER: ICD-10-CM

## 2021-11-01 PROBLEM — E66.812 CLASS 2 SEVERE OBESITY DUE TO EXCESS CALORIES WITH SERIOUS COMORBIDITY AND BODY MASS INDEX (BMI) OF 36.0 TO 36.9 IN ADULT: Status: ACTIVE | Noted: 2021-11-01

## 2021-11-01 PROCEDURE — 99214 OFFICE O/P EST MOD 30 MIN: CPT | Performed by: FAMILY MEDICINE

## 2021-11-01 RX ORDER — ATORVASTATIN CALCIUM 40 MG/1
40 TABLET, FILM COATED ORAL NIGHTLY
Qty: 90 TABLET | Refills: 0 | Status: SHIPPED | OUTPATIENT
Start: 2021-11-01 | End: 2022-02-25 | Stop reason: SINTOL

## 2021-11-01 RX ORDER — LISINOPRIL 40 MG/1
40 TABLET ORAL 2 TIMES DAILY
Qty: 60 TABLET | Refills: 2 | Status: SHIPPED | OUTPATIENT
Start: 2021-11-01 | End: 2022-02-02

## 2021-11-01 NOTE — PROGRESS NOTES
"Subjective  cc: HTN   Roscoe Frias is a 51 y.o. male with HTN, obesity, HLD, and h/o CVA who presents for follow up on HTN .   Pt reports he cannot take the HCTZ - reports it makes him irritable   Was on lisinopril 80 mg for past 3 years   Pt states he was not contacted about MRI or neuro - out records state we sent him a letter and no call back - phone number wrong in chart   He has cut back to only smoking 3-4 cig/day   Reports he had a sleep study about 4 years ago - was negative at that [facility] has a lot of the symptoms of sleep apnea       History of Present Illness     The following portions of the patient's history were reviewed and updated as appropriate: allergies, current medications, past family history, past medical history, past social history, past surgical history and problem list.        Review of Systems   Constitutional: Positive for unexpected weight change.   Psychiatric/Behavioral: Positive for agitation and decreased concentration.   All other systems reviewed and are negative.      Objective   Blood pressure (!) 176/114, pulse 79, temperature 98.6 °F (37 °C), temperature source Temporal, resp. rate 20, height 180.3 cm (71\"), weight 125 kg (275 lb 12.8 oz), SpO2 98 %.  Physical Exam  Vitals and nursing note reviewed.   Constitutional:       General: He is not in acute distress.     Appearance: He is well-developed. He is obese. He is not diaphoretic.   HENT:      Head: Normocephalic and atraumatic.      Right Ear: External ear normal.      Left Ear: External ear normal.      Nose: Nose normal.   Eyes:      General:         Right eye: No discharge.         Left eye: No discharge.      Conjunctiva/sclera: Conjunctivae normal.   Neck:      Thyroid: No thyromegaly.      Trachea: No tracheal deviation.   Cardiovascular:      Rate and Rhythm: Normal rate and regular rhythm.      Heart sounds: Normal heart sounds.   Pulmonary:      Effort: Pulmonary effort is normal. No respiratory distress.      " Breath sounds: Normal breath sounds. No stridor. No wheezing.   Chest:      Chest wall: No tenderness.   Abdominal:      General: There is no distension.      Palpations: Abdomen is soft.      Tenderness: There is no abdominal tenderness.   Musculoskeletal:         General: Normal range of motion.      Cervical back: Normal range of motion.   Lymphadenopathy:      Cervical: No cervical adenopathy.   Skin:     General: Skin is warm and dry.   Neurological:      Mental Status: He is alert and oriented to person, place, and time.      Motor: No abnormal muscle tone.      Coordination: Coordination normal.   Psychiatric:         Behavior: Behavior normal.         Thought Content: Thought content normal.         Judgment: Judgment normal.         Assessment/Plan   Problems Addressed this Visit        Cardiac and Vasculature    Essential hypertension - Primary    Relevant Medications    lisinopril (PRINIVIL,ZESTRIL) 40 MG tablet    Mixed hyperlipidemia    Relevant Medications    atorvastatin (Lipitor) 40 MG tablet       Endocrine and Metabolic    Class 2 severe obesity due to excess calories with serious comorbidity and body mass index (BMI) of 36.0 to 36.9 in adult ([facility])       Neuro    History of CVA in adulthood       Sleep    Hypersomnia    Relevant Orders    Ambulatory Referral to Sleep Medicine      Other Visit Diagnoses     Smoker          Diagnoses       Codes Comments    Essential hypertension    -  Primary ICD-10-CM: I10  ICD-9-CM: 401.9     Class 2 severe obesity due to excess calories with serious comorbidity and body mass index (BMI) of 38.0 to 38.9 in adult ([facility])     ICD-10-CM: E66.01, Z68.38  ICD-9-CM: 278.01, V85.38     Hypersomnia     ICD-10-CM: G47.10  ICD-9-CM: 780.54     Mixed hyperlipidemia     ICD-10-CM: E78.2  ICD-9-CM: 272.2     History of CVA in adulthood     ICD-10-CM: Z86.73  ICD-9-CM: V12.54     Smoker     ICD-10-CM: F17.200  ICD-9-CM: 305.1           PLAN:   #1 HTN: chronic, uncontrolled, will  increase dose of lisinopril to 80 MG daily - advised pt I am not confident this will be helpful however he insists this is the dose he has taken for the past 3 years - recehck in 2 weeks     #2 obesity: Patient's Body mass index is 38.47 kg/m². indicating that he is morbidly obese (BMI > 40 or > 35 with obesity - related health condition). Obesity-related health conditions include the following: hypertension. Obesity is worsening. BMI is is above average; BMI management plan is completed. We discussed portion control and increasing exercise.    #3 HLD: chronic, uncontrolled, reviewed lipid panel, will start on statin - advised on risks and beneifts of meidcation     #4 h/o CVA: referral for neuro and MRI, advised on risk factor modification such as stopping smoking, lowering BP and lipids, ASA and eval for MANSOOR     #5 hypersomnia: chronic, uncontrolled, referral to sleep med           This document has been electronically signed by Barbara Waters MD on November 1, 2021 16:07 CDT

## 2021-11-02 DIAGNOSIS — Z86.73 HISTORY OF CVA IN ADULTHOOD: Primary | ICD-10-CM

## 2021-11-03 ENCOUNTER — TELEPHONE (OUTPATIENT)
Dept: FAMILY MEDICINE CLINIC | Facility: CLINIC | Age: 51
End: 2021-11-03

## 2021-11-03 NOTE — TELEPHONE ENCOUNTER
Caller: JENIFER PAGAN    Relationship to patient: Emergency Contact    Best call back number: [phone]    Patient went to the dentist for dental work today, his BP was elevated, so the dentist could not do the work . He was told to contact his PCP in regards to this. He is in severe pain and he has been taking ibuprofen 800 mg TID. These are not helping the pain. Please advise.

## 2021-11-09 NOTE — TELEPHONE ENCOUNTER
Message left for patient to return phone call. Attempted to call Mary as well and phone was not hers.

## 2021-11-10 ENCOUNTER — OFFICE VISIT (OUTPATIENT)
Dept: FAMILY MEDICINE CLINIC | Facility: CLINIC | Age: 51
End: 2021-11-10

## 2021-11-10 VITALS
RESPIRATION RATE: 18 BRPM | BODY MASS INDEX: 38.64 KG/M2 | HEIGHT: 71 IN | OXYGEN SATURATION: 98 % | TEMPERATURE: 97.5 F | DIASTOLIC BLOOD PRESSURE: 90 MMHG | WEIGHT: 276 LBS | HEART RATE: 76 BPM | SYSTOLIC BLOOD PRESSURE: 146 MMHG

## 2021-11-10 DIAGNOSIS — I10 ESSENTIAL HYPERTENSION: Primary | ICD-10-CM

## 2021-11-10 DIAGNOSIS — E78.2 MIXED HYPERLIPIDEMIA: ICD-10-CM

## 2021-11-10 PROCEDURE — 99214 OFFICE O/P EST MOD 30 MIN: CPT | Performed by: NURSE PRACTITIONER

## 2021-11-10 RX ORDER — CLONIDINE HYDROCHLORIDE 0.1 MG/1
0.2 TABLET ORAL ONCE
Status: COMPLETED | OUTPATIENT
Start: 2021-11-10 | End: 2021-11-10

## 2021-11-10 RX ORDER — CLONIDINE HYDROCHLORIDE 0.1 MG/1
0.1 TABLET ORAL 2 TIMES DAILY
Qty: 60 TABLET | Refills: 0 | Status: SHIPPED | OUTPATIENT
Start: 2021-11-10 | End: 2021-12-14

## 2021-11-10 RX ADMIN — CLONIDINE HYDROCHLORIDE 0.2 MG: 0.1 TABLET ORAL at 16:18

## 2021-11-10 NOTE — PROGRESS NOTES
Chief Complaint  Hypertension    Subjective          Roscoe Frias presents to [facility]  HTN with elevation today.  Denies chest pain, shortness of breath or palpitations.  Quit taking the hctz because it made him mean.  Says that he used to be on clonidine twice daily and that worked the best and he wants to go back on it.  MRI was cancelled and he doesn't know why and is very upset. Says he has had 5 strokes and doesn't want to have another one with his bp elevated    Hypertension  This is a chronic problem. The problem has been gradually worsening since onset. The problem is uncontrolled. Pertinent negatives include no blurred vision, chest pain, neck pain, palpitations, PND or shortness of breath. Risk factors for coronary artery disease include male gender, dyslipidemia, family history, obesity and sedentary lifestyle. Past treatments include ACE inhibitors. Current antihypertension treatment includes ACE inhibitors. The current treatment provides mild improvement. There are no compliance problems.  There is no history of kidney disease, heart failure or retinopathy. There is no history of chronic renal disease.   Hyperlipidemia  This is a chronic problem. The problem is uncontrolled. Exacerbating diseases include obesity. He has no history of chronic renal disease or hypothyroidism. There are no known factors aggravating his hyperlipidemia. Pertinent negatives include no chest pain or shortness of breath. Current antihyperlipidemic treatment includes statins. The current treatment provides mild improvement of lipids. There are no compliance problems.  Risk factors for coronary artery disease include dyslipidemia, family history, hypertension and male sex.     The following portions of the patient's history were reviewed and updated as appropriate: allergies, current medications, past family history, past medical history, past social history, past surgical history and  "problem list.    Objective   Vital Signs:   BP (!) 187/126 (BP Location: Left arm, Patient Position: Sitting, Cuff Size: Large Adult)   Pulse 76   Temp 97.5 °F (36.4 °C) (Infrared)   Resp 18   Ht 180.3 cm (71\") Comment: per patient  Wt 125 kg (276 lb)   SpO2 98%   BMI 38.49 kg/m²     Physical Exam  Vitals and nursing note reviewed.   Constitutional:       General: He is awake.      Appearance: Normal appearance. He is well-developed and well-groomed.   HENT:      Head: Normocephalic and atraumatic.      Right Ear: Hearing, tympanic membrane, ear canal and external ear normal.      Left Ear: Hearing, tympanic membrane, ear canal and external ear normal.      Nose: Nose normal.      Mouth/Throat:      Lips: Pink.      Pharynx: Oropharynx is clear.   Eyes:      General: Lids are normal.      Conjunctiva/sclera: Conjunctivae normal.   Cardiovascular:      Rate and Rhythm: Normal rate and regular rhythm.      Heart sounds: Normal heart sounds.   Pulmonary:      Effort: Pulmonary effort is normal.      Breath sounds: Normal breath sounds and air entry.   Musculoskeletal:      Cervical back: Full passive range of motion without pain.      Right lower leg: No edema.      Left lower leg: No edema.   Lymphadenopathy:      Head:      Right side of head: No submental, submandibular or tonsillar adenopathy.      Left side of head: No submental, submandibular or tonsillar adenopathy.   Skin:     General: Skin is warm and dry.   Neurological:      Mental Status: He is alert.      Sensory: Sensation is intact.      Motor: Motor function is intact.      Coordination: Coordination is intact.      Gait: Gait is intact.   Psychiatric:         Attention and Perception: Attention and perception normal.         Mood and Affect: Mood and affect normal.         Speech: Speech normal.         Behavior: Behavior normal. Behavior is cooperative.         Thought Content: Thought content normal.         Cognition and Memory: Cognition and " memory normal.         Judgment: Judgment normal.        Result Review :                 Assessment and Plan    Diagnoses and all orders for this visit:    1. Essential hypertension (Primary)  -     cloNIDine (CATAPRES) tablet 0.2 mgn given in office today   -     cloNIDine (Catapres) 0.1 MG tablet; Take 1 tablet by mouth 2 (Two) Times a Day.  Dispense: 60 tablet; Refill: 0    2. Mixed hyperlipidemia       -      Continue atorvastatin as prescribed      I spent 12 minutes caring for Roscoe on this date of service. This time includes time spent by me in the following activities:preparing for the visit, performing a medically appropriate examination and/or evaluation , counseling and educating the patient/family/caregiver, ordering medications, tests, or procedures, documenting information in the medical record and care coordination  Follow Up   Return in about 1 month (around 12/10/2021) for Recheck.  Patient was given instructions and counseling regarding his condition or for health maintenance advice. Please see specific information pulled into the AVS if appropriate.   Electronically signed by Mayra Storm, OSMAN, APRN, 11/28/21, 11:20 PM CST.

## 2021-12-01 ENCOUNTER — OFFICE VISIT (OUTPATIENT)
Dept: FAMILY MEDICINE CLINIC | Facility: CLINIC | Age: 51
End: 2021-12-01

## 2021-12-01 VITALS
DIASTOLIC BLOOD PRESSURE: 80 MMHG | RESPIRATION RATE: 16 BRPM | HEART RATE: 84 BPM | WEIGHT: 282.6 LBS | TEMPERATURE: 98.7 F | SYSTOLIC BLOOD PRESSURE: 160 MMHG | BODY MASS INDEX: 39.56 KG/M2 | HEIGHT: 71 IN | OXYGEN SATURATION: 98 %

## 2021-12-01 VITALS
TEMPERATURE: 98.7 F | RESPIRATION RATE: 16 BRPM | SYSTOLIC BLOOD PRESSURE: 160 MMHG | HEART RATE: 84 BPM | WEIGHT: 282 LBS | OXYGEN SATURATION: 98 % | HEIGHT: 71 IN | DIASTOLIC BLOOD PRESSURE: 80 MMHG | BODY MASS INDEX: 39.48 KG/M2

## 2021-12-01 DIAGNOSIS — E78.2 MIXED HYPERLIPIDEMIA: ICD-10-CM

## 2021-12-01 DIAGNOSIS — R73.09 ELEVATED GLUCOSE: ICD-10-CM

## 2021-12-01 DIAGNOSIS — D58.2 ELEVATED HEMOGLOBIN (HCC): ICD-10-CM

## 2021-12-01 DIAGNOSIS — I10 ESSENTIAL HYPERTENSION: Primary | ICD-10-CM

## 2021-12-01 DIAGNOSIS — Z86.73 HISTORY OF CVA IN ADULTHOOD: ICD-10-CM

## 2021-12-01 DIAGNOSIS — E66.01 CLASS 2 SEVERE OBESITY DUE TO EXCESS CALORIES WITH SERIOUS COMORBIDITY AND BODY MASS INDEX (BMI) OF 39.0 TO 39.9 IN ADULT (HCC): ICD-10-CM

## 2021-12-01 DIAGNOSIS — M25.561 CHRONIC PAIN OF RIGHT KNEE: ICD-10-CM

## 2021-12-01 DIAGNOSIS — G89.29 CHRONIC PAIN OF RIGHT KNEE: ICD-10-CM

## 2021-12-01 DIAGNOSIS — Z00.00 WELL ADULT EXAM: Primary | ICD-10-CM

## 2021-12-01 PROCEDURE — 99396 PREV VISIT EST AGE 40-64: CPT | Performed by: FAMILY MEDICINE

## 2021-12-01 NOTE — PROGRESS NOTES
"Subjective Chief complaint: Hypertension  Roscoe Frias is a 51 y.o. male with hypertension, hyperlipidemia, history of CVA, and obesity who presents for follow-up.     Reports that he has right knee pain - gradually worsening over the past 4 months - no injury. Reports it hurts all over, reports swelling, has been taking ibuprofen without improvement, feels like it will pop in the evenings.     Pt BP has been runnign fannie r- currently on lisinopril 40 BID and clonidine 0.1 BID          History of Present Illness     The following portions of the patient's history were reviewed and updated as appropriate: allergies, current medications, past family history, past medical history, past social history, past surgical history and problem list.        Review of Systems   Constitutional: Positive for activity change, appetite change and unexpected weight change.   Musculoskeletal: Positive for arthralgias, gait problem and myalgias.   Psychiatric/Behavioral: Positive for agitation.   All other systems reviewed and are negative.      Objective   Blood pressure 160/80, pulse 84, temperature 98.7 °F (37.1 °C), temperature source Infrared, resp. rate 16, height 180.3 cm (71\"), weight 128 kg (282 lb 9.6 oz), SpO2 98 %.  Physical Exam  Vitals and nursing note reviewed.   Constitutional:       General: He is not in acute distress.     Appearance: He is well-developed. He is obese. He is not diaphoretic.   HENT:      Head: Normocephalic and atraumatic.      Left Ear: External ear normal.      Nose: Nose normal.   Eyes:      General:         Right eye: No discharge.         Left eye: No discharge.      Conjunctiva/sclera: Conjunctivae normal.   Neck:      Thyroid: No thyromegaly.      Trachea: No tracheal deviation.   Cardiovascular:      Rate and Rhythm: Normal rate and regular rhythm.      Heart sounds: Normal heart sounds.   Pulmonary:      Effort: Pulmonary effort is normal. No respiratory distress.      Breath sounds: " Normal breath sounds. No stridor. No wheezing.   Chest:      Chest wall: No tenderness.   Abdominal:      General: There is no distension.      Palpations: Abdomen is soft.      Tenderness: There is no abdominal tenderness.   Musculoskeletal:         General: Tenderness present.      Cervical back: Normal range of motion.   Lymphadenopathy:      Cervical: No cervical adenopathy.   Skin:     General: Skin is warm and dry.   Neurological:      Mental Status: He is alert and oriented to person, place, and time.      Motor: No abnormal muscle tone.      Coordination: Coordination normal.      Gait: Gait abnormal.   Psychiatric:         Behavior: Behavior normal.         Thought Content: Thought content normal.         Judgment: Judgment normal.         Assessment/Plan   Problems Addressed this Visit        Cardiac and Vasculature    Essential hypertension - Primary    Mixed hyperlipidemia       Endocrine and Metabolic    Class 2 severe obesity due to excess calories with serious comorbidity and body mass index (BMI) of 39.0 to 39.9 in adult ([facility])       Neuro    History of CVA in adulthood      Other Visit Diagnoses     Chronic pain of right knee        Relevant Orders    XR Knee 1 or 2 View Right (Completed)    Ambulatory Referral to Orthopedic Surgery (Completed)      Diagnoses       Codes Comments    Essential hypertension    -  Primary ICD-10-CM: I10  ICD-9-CM: 401.9     Chronic pain of right knee     ICD-10-CM: M25.561, G89.29  ICD-9-CM: 719.46, 338.29     Class 2 severe obesity due to excess calories with serious comorbidity and body mass index (BMI) of 39.0 to 39.9 in adult ([facility])     ICD-10-CM: E66.01, Z68.39  ICD-9-CM: 278.01, V85.39     Mixed hyperlipidemia     ICD-10-CM: E78.2  ICD-9-CM: 272.2     History of CVA in adulthood     ICD-10-CM: Z86.73  ICD-9-CM: V12.54         Plan:    1. Right knee pain: Chronic, uncontrolled. Worsening. Will get x-ray of the right knee. Referral to orthopedic surgery for further  evaluation.    2. Hypertension: Chronic, uncontrolled. Patient has been taking lisinopril 80 mg and clonidine. Advised on blood pressure goals. His blood pressure is improving but still not to goal. Patient reports side effects to multiple other medications. Advised on monitoring. DASH diet.    3. Hyperlipidemia: Chronic, uncontrolled. Continue on statin. Reviewed lipid panel.    4. History of CVA: Chronic, stable. Advised on importance of risk factor modification. Has appointment scheduled with neurology. Advised to keep that appointment. MRI was not approved. Will follow up with neuro for further recommendations.          This document has been electronically signed by Barbara Waters MD on December 20, 2021 17:28 CST              Methotrexate Counseling:  Patient counseled regarding adverse effects of methotrexate including but not limited to nausea, vomiting, abnormalities in liver function tests. Patients may develop mouth sores, rash, diarrhea, and abnormalities in blood counts. The patient understands that monitoring is required including LFT's and blood counts.  There is a rare possibility of scarring of the liver and lung problems that can occur when taking methotrexate. Persistent nausea, loss of appetite, pale stools, dark urine, cough, and shortness of breath should be reported immediately. Patient advised to discontinue methotrexate treatment at least three months before attempting to become pregnant.  I discussed the need for folate supplements while taking methotrexate.  These supplements can decrease side effects during methotrexate treatment. The patient verbalized understanding of the proper use and possible adverse effects of methotrexate.  All of the patient's questions and concerns were addressed.

## 2021-12-01 NOTE — PROGRESS NOTES
"Subjective cc: adult well exam   Roscoe Frias is a 51 y.o. male who presents for adult well exam.     Reviewed immunizations  Blood pressure above goal today  Obesity trending up  Pt reports he had a cscope 4 years ago - doesn't remember what his follow up was     History of Present Illness     The following portions of the patient's history were reviewed and updated as appropriate: allergies, current medications, past family history, past medical history, past social history, past surgical history and problem list.        Review of Systems   Constitutional: Positive for activity change, appetite change and unexpected weight change.   Musculoskeletal: Positive for arthralgias.   All other systems reviewed and are negative.      Objective   Blood pressure 160/80, pulse 84, temperature 98.7 °F (37.1 °C), temperature source Infrared, resp. rate 16, height 180.3 cm (71\"), weight 128 kg (282 lb), SpO2 98 %.  Physical Exam  Vitals and nursing note reviewed.   Constitutional:       General: He is not in acute distress.     Appearance: He is well-developed. He is obese. He is not diaphoretic.   HENT:      Head: Normocephalic and atraumatic.      Right Ear: External ear normal.      Left Ear: External ear normal.      Nose: Nose normal.   Eyes:      General:         Right eye: No discharge.         Left eye: No discharge.      Conjunctiva/sclera: Conjunctivae normal.   Neck:      Thyroid: No thyromegaly.      Trachea: No tracheal deviation.   Cardiovascular:      Rate and Rhythm: Normal rate and regular rhythm.      Heart sounds: Normal heart sounds.   Pulmonary:      Effort: Pulmonary effort is normal. No respiratory distress.      Breath sounds: Normal breath sounds. No stridor. No wheezing.   Chest:      Chest wall: No tenderness.   Abdominal:      General: Bowel sounds are normal. There is no distension.      Palpations: Abdomen is soft.      Tenderness: There is no abdominal tenderness.   Musculoskeletal:         " General: Normal range of motion.      Cervical back: Normal range of motion.   Lymphadenopathy:      Cervical: No cervical adenopathy.   Skin:     General: Skin is warm and dry.   Neurological:      Mental Status: He is alert and oriented to person, place, and time.      Motor: No abnormal muscle tone.      Coordination: Coordination normal.   Psychiatric:         Behavior: Behavior normal.         Thought Content: Thought content normal.         Judgment: Judgment normal.         Assessment/Plan   Problems Addressed this Visit     None      Visit Diagnoses     Well adult exam    -  Primary    Elevated glucose        Relevant Orders    Hemoglobin A1c (Completed)    Elevated hemoglobin (HCC)        Relevant Orders    CBC (No Diff) (Completed)      Diagnoses       Codes Comments    Well adult exam    -  Primary ICD-10-CM: Z00.00  ICD-9-CM: V70.0     Elevated glucose     ICD-10-CM: R73.09  ICD-9-CM: 790.29     Elevated hemoglobin (HCC)     ICD-10-CM: D58.2  ICD-9-CM: 282.7           Plan:    1. Adult well exam: Reviewed labs, will repeat as appropriate, advised on diet lifestyle changes, advised on blood pressure goals, recommended DASH diet, reviewed past colonoscopy, will check on when due for repeat, reviewed and updated immunizations          This document has been electronically signed by Barbara Waters MD on December 20, 2021 17:26 CST

## 2021-12-02 LAB
ERYTHROCYTE [DISTWIDTH] IN BLOOD BY AUTOMATED COUNT: 13 % (ref 12.3–15.4)
HBA1C MFR BLD: 6.4 % (ref 4.8–5.6)
HCT VFR BLD AUTO: 52 % (ref 37.5–51)
HGB BLD-MCNC: 17.9 G/DL (ref 13–17.7)
MCH RBC QN AUTO: 30 PG (ref 26.6–33)
MCHC RBC AUTO-ENTMCNC: 34.4 G/DL (ref 31.5–35.7)
MCV RBC AUTO: 87.1 FL (ref 79–97)
PLATELET # BLD AUTO: 232 10*3/MM3 (ref 140–450)
RBC # BLD AUTO: 5.97 10*6/MM3 (ref 4.14–5.8)
WBC # BLD AUTO: 7.78 10*3/MM3 (ref 3.4–10.8)

## 2021-12-13 DIAGNOSIS — I10 ESSENTIAL HYPERTENSION: ICD-10-CM

## 2021-12-14 RX ORDER — CLONIDINE HYDROCHLORIDE 0.1 MG/1
TABLET ORAL
Qty: 60 TABLET | Refills: 0 | Status: SHIPPED | OUTPATIENT
Start: 2021-12-14 | End: 2022-01-18

## 2021-12-14 NOTE — TELEPHONE ENCOUNTER
Rx Refill Note  Requested Prescriptions     Pending Prescriptions Disp Refills   • cloNIDine (CATAPRES) 0.1 MG tablet [Pharmacy Med Name: CLONIDINE 0.1MG     TAB] 60 tablet 0     Sig: Take 1 tablet by mouth twice daily      Last office visit with prescribing clinician: 11/10/2021      Next office visit with prescribing clinician: 3/3/2022    LRX:11/10/2021-1 month           Zaira Evangelista MA  12/14/21, 10:43 CST

## 2021-12-16 ENCOUNTER — OFFICE VISIT (OUTPATIENT)
Dept: NEUROLOGY | Facility: CLINIC | Age: 51
End: 2021-12-16

## 2021-12-16 VITALS
HEART RATE: 82 BPM | DIASTOLIC BLOOD PRESSURE: 90 MMHG | WEIGHT: 277 LBS | RESPIRATION RATE: 17 BRPM | SYSTOLIC BLOOD PRESSURE: 188 MMHG | BODY MASS INDEX: 38.78 KG/M2 | OXYGEN SATURATION: 98 % | HEIGHT: 71 IN

## 2021-12-16 DIAGNOSIS — Z86.73 HISTORY OF CVA IN ADULTHOOD: ICD-10-CM

## 2021-12-16 DIAGNOSIS — G47.19 DAYTIME HYPERSOMNOLENCE: Primary | ICD-10-CM

## 2021-12-16 PROCEDURE — 99214 OFFICE O/P EST MOD 30 MIN: CPT | Performed by: NURSE PRACTITIONER

## 2021-12-16 RX ORDER — HYDROCODONE BITARTRATE AND ACETAMINOPHEN 5; 325 MG/1; MG/1
TABLET ORAL
COMMUNITY
Start: 2021-12-10 | End: 2022-06-03
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
## 2022-01-17 DIAGNOSIS — I10 ESSENTIAL HYPERTENSION: ICD-10-CM

## 2022-01-18 RX ORDER — CLONIDINE HYDROCHLORIDE 0.1 MG/1
TABLET ORAL
Qty: 60 TABLET | Refills: 0 | Status: SHIPPED | OUTPATIENT
Start: 2022-01-18 | End: 2022-02-23

## 2022-01-18 NOTE — TELEPHONE ENCOUNTER
Rx Refill Note  Requested Prescriptions     Pending Prescriptions Disp Refills   • cloNIDine (CATAPRES) 0.1 MG tablet [Pharmacy Med Name: CLONIDINE 0.1MG     TAB] 60 tablet 0     Sig: Take 1 tablet by mouth twice daily      Last office visit with prescribing clinician: 12/1/2021      Next office visit with prescribing clinician: 3/3/2022  Last RX: 12/14/2021   Christopher Seals MA  01/18/22, 16:13 CST

## 2022-01-19 DIAGNOSIS — G47.19 DAYTIME HYPERSOMNOLENCE: Primary | ICD-10-CM

## 2022-01-24 ENCOUNTER — HOSPITAL ENCOUNTER (OUTPATIENT)
Dept: SLEEP MEDICINE | Facility: HOSPITAL | Age: 52
End: 2022-01-24

## 2022-01-31 PROCEDURE — 93000 ELECTROCARDIOGRAM COMPLETE: CPT | Performed by: INTERNAL MEDICINE

## 2022-02-02 DIAGNOSIS — I10 ESSENTIAL HYPERTENSION: ICD-10-CM

## 2022-02-02 RX ORDER — LISINOPRIL 40 MG/1
TABLET ORAL
Qty: 180 TABLET | Refills: 0 | Status: SHIPPED | OUTPATIENT
Start: 2022-02-02 | End: 2022-05-04

## 2022-02-02 NOTE — TELEPHONE ENCOUNTER
Rx Refill Note  Requested Prescriptions     Pending Prescriptions Disp Refills   • lisinopril (PRINIVIL,ZESTRIL) 40 MG tablet [Pharmacy Med Name: Lisinopril 40 MG Oral Tablet] 180 tablet 0     Sig: Take 1 tablet by mouth twice daily      Last office visit with prescribing clinician: 12/1/2021      Next office visit with prescribing clinician: 3/3/2022            Amy Barraza LPN  02/02/22, 12:27 CST

## 2022-02-19 ENCOUNTER — LAB (OUTPATIENT)
Dept: LAB | Facility: HOSPITAL | Age: 52
End: 2022-02-19

## 2022-02-19 DIAGNOSIS — G47.19 DAYTIME HYPERSOMNOLENCE: ICD-10-CM

## 2022-02-19 LAB — SARS-COV-2 ORF1AB RESP QL NAA+PROBE: NOT DETECTED

## 2022-02-19 PROCEDURE — C9803 HOPD COVID-19 SPEC COLLECT: HCPCS

## 2022-02-19 PROCEDURE — U0004 COV-19 TEST NON-CDC HGH THRU: HCPCS

## 2022-02-20 DIAGNOSIS — I10 ESSENTIAL HYPERTENSION: ICD-10-CM

## 2022-02-21 NOTE — TELEPHONE ENCOUNTER
Rx Refill Note  Requested Prescriptions     Pending Prescriptions Disp Refills   • cloNIDine (CATAPRES) 0.1 MG tablet [Pharmacy Med Name: cloNIDine HCl 0.1 MG Oral Tablet] 60 tablet 0     Sig: Take 1 tablet by mouth twice daily      Last office visit with prescribing clinician: 12/1/2021      Next office visit with prescribing clinician: 3/3/2022            Kylie Ardon MA  02/21/22, 13:55 CST

## 2022-02-22 ENCOUNTER — HOSPITAL ENCOUNTER (OUTPATIENT)
Dept: SLEEP MEDICINE | Facility: HOSPITAL | Age: 52
Discharge: HOME | End: 2022-02-22
Admitting: NURSE PRACTITIONER

## 2022-02-22 ENCOUNTER — OFFICE VISIT (OUTPATIENT)
Dept: CARDIOLOGY | Facility: CLINIC | Age: 52
End: 2022-02-22

## 2022-02-22 VITALS
WEIGHT: 288 LBS | HEART RATE: 83 BPM | DIASTOLIC BLOOD PRESSURE: 118 MMHG | BODY MASS INDEX: 40.32 KG/M2 | OXYGEN SATURATION: 96 % | SYSTOLIC BLOOD PRESSURE: 164 MMHG | HEIGHT: 71 IN

## 2022-02-22 VITALS
RESPIRATION RATE: 16 BRPM | DIASTOLIC BLOOD PRESSURE: 102 MMHG | BODY MASS INDEX: 40.32 KG/M2 | SYSTOLIC BLOOD PRESSURE: 158 MMHG | WEIGHT: 288 LBS | OXYGEN SATURATION: 95 % | HEART RATE: 87 BPM | HEIGHT: 71 IN

## 2022-02-22 DIAGNOSIS — I10 ESSENTIAL HYPERTENSION: Primary | ICD-10-CM

## 2022-02-22 DIAGNOSIS — G47.19 DAYTIME HYPERSOMNOLENCE: ICD-10-CM

## 2022-02-22 DIAGNOSIS — R94.31 ABNORMAL ECG: ICD-10-CM

## 2022-02-22 DIAGNOSIS — Z86.73 HISTORY OF CVA IN ADULTHOOD: ICD-10-CM

## 2022-02-22 DIAGNOSIS — I63.9 CRYPTOGENIC STROKE: ICD-10-CM

## 2022-02-22 DIAGNOSIS — E66.01 CLASS 2 SEVERE OBESITY DUE TO EXCESS CALORIES WITH SERIOUS COMORBIDITY AND BODY MASS INDEX (BMI) OF 39.0 TO 39.9 IN ADULT: ICD-10-CM

## 2022-02-22 DIAGNOSIS — E78.2 MIXED HYPERLIPIDEMIA: ICD-10-CM

## 2022-02-22 DIAGNOSIS — G47.10 HYPERSOMNIA: ICD-10-CM

## 2022-02-22 PROCEDURE — 99204 OFFICE O/P NEW MOD 45 MIN: CPT | Performed by: INTERNAL MEDICINE

## 2022-02-22 PROCEDURE — 95810 POLYSOM 6/> YRS 4/> PARAM: CPT | Performed by: PSYCHIATRY & NEUROLOGY

## 2022-02-22 PROCEDURE — 95810 POLYSOM 6/> YRS 4/> PARAM: CPT

## 2022-02-22 NOTE — PROGRESS NOTES
Roscoe Frias  [id number]  1970  52 y.o.  male    Referring Provider: Barbara Waters MD    Reason for  Visit:  Initial visit for  preoperative cardiovascular clearance under general anesthesia for right total knee replacement    Prior cerebrovascular accident    preoperative cardiovascular clearance under general anesthesia for knee surgery     Subjective    Mild chronic exertional shortness of breath on exertion relieved with rest  No significant cough or wheezing    No palpitations  No associated chest pain  No significant pedal edema    No fever or chills  No significant expectoration    No hemoptysis  No presyncope or syncope    Tolerating current medications well with no untoward side effects   Compliant with prescribed medication regimen. Tries to adhere to cardiac diet.     Poor effort tolerance  Effort tolerance limited more by orthopedic rather than cardiac related issues, therefore difficult to assess functional capacity.      No presyncope or syncope  Compliant with medications    Tolerating current medications well with no untoward side effects   Compliant with prescribed medication regimen. Tries to adhere to cardiac diet.      BP well controlled at home.     Ran out of BP meds and refilled today   BP elevated as did not take his meds     Has moderate snoring with daytime fatigue    Recent  ECG shows new T wave inversion inferior lateral as noted below      preoperative cardiovascular clearance under general anesthesia for knee surgery        History of present illness:  Roscoe Frisa is a 52 y.o. yo male with essential hypertension    who presents today for   Chief Complaint   Patient presents with   • sx clearance     sx clearance- total right knee with Dr Julien.    .    History  Past Medical History:   Diagnosis Date   • Hypertension    • Stroke (HCC)    ,   Past Surgical History:   Procedure Laterality Date   • BACK SURGERY     • NECK SURGERY     ,   Family History   Problem  Relation Age of Onset   • No Known Problems Mother    • No Known Problems Father    • No Known Problems Sister    • No Known Problems Brother    • No Known Problems Son    • No Known Problems Daughter    • No Known Problems Maternal Grandmother    • No Known Problems Maternal Grandfather    • No Known Problems Paternal Grandmother    • No Known Problems Paternal Grandfather    • No Known Problems Cousin    • Rheum arthritis Neg Hx    • Osteoarthritis Neg Hx    • Asthma Neg Hx    • Diabetes Neg Hx    • Heart failure Neg Hx    • Hyperlipidemia Neg Hx    • Hypertension Neg Hx    • Migraines Neg Hx    • Rashes / Skin problems Neg Hx    • Seizures Neg Hx    • Stroke Neg Hx    • Thyroid disease Neg Hx    ,   Social History     Tobacco Use   • Smoking status: Current Every Day Smoker     Packs/day: 0.50     Types: Cigarettes     Start date: 2015   • Smokeless tobacco: Never Used   Vaping Use   • Vaping Use: Never used   Substance Use Topics   • Alcohol use: No   • Drug use: No   ,     Medications  Current Outpatient Medications   Medication Sig Dispense Refill   • aspirin 81 MG EC tablet Take 81 mg by mouth Daily.     • atorvastatin (Lipitor) 40 MG tablet Take 1 tablet by mouth Every Night. 90 tablet 0   • cloNIDine (CATAPRES) 0.1 MG tablet Take 1 tablet by mouth twice daily 60 tablet 0   • lisinopril (PRINIVIL,ZESTRIL) 40 MG tablet Take 1 tablet by mouth twice daily 180 tablet 0   • HYDROcodone-acetaminophen (NORCO) 5-325 MG per tablet        No current facility-administered medications for this visit.       Allergies:  Hctz [hydrochlorothiazide], Magnesium-containing compounds, Toradol [ketorolac tromethamine], and Tramadol    Review of Systems  Review of Systems   Constitutional: Negative.   HENT: Negative.    Eyes: Negative.    Cardiovascular: Positive for dyspnea on exertion. Negative for chest pain, claudication, cyanosis, irregular heartbeat, leg swelling, near-syncope, orthopnea, palpitations, paroxysmal nocturnal  "dyspnea and syncope.   Respiratory: Negative.    Endocrine: Negative.    Hematologic/Lymphatic: Negative.    Skin: Negative.    Musculoskeletal: Positive for arthritis and joint pain.   Gastrointestinal: Negative for anorexia.   Genitourinary: Negative.    Neurological: Negative.    Psychiatric/Behavioral: Negative.        Objective     Physical Exam:  BP (!) 164/118   Pulse 83   Ht 180.3 cm (71\")   Wt 131 kg (288 lb)   SpO2 96%   BMI 40.17 kg/m²     Physical Exam  Constitutional:       Appearance: He is well-developed.   HENT:      Head: Normocephalic.   Neck:      Vascular: Normal carotid pulses. No carotid bruit or JVD.      Trachea: No tracheal tenderness or tracheal deviation.   Cardiovascular:      Rate and Rhythm: Regular rhythm.      Pulses: Normal pulses.      Heart sounds: Murmur heard.    Systolic murmur is present with a grade of 2/6.      Pulmonary:      Effort: Pulmonary effort is normal.      Breath sounds: No stridor.   Abdominal:      Palpations: Abdomen is soft.   Musculoskeletal:      Cervical back: No edema.   Skin:     General: Skin is warm.   Neurological:      Mental Status: He is alert.      Cranial Nerves: No cranial nerve deficit.      Sensory: No sensory deficit.   Psychiatric:         Speech: Speech normal.         Behavior: Behavior normal.         Results Review:     ____________________________________________________________________________________________________________________________________________  Health maintenance and recommendations    Low salt/ HTN/ Heart healthy carbohydrate restricted cardiac diet   The patient is advised to reduce or avoid caffeine or other cardiac stimulants.   Minimize or avoid  NSAID-type medications      Monitor for any signs of bleeding including red or dark stools. Fall precautions.   Advised staying uptodate with immunizations per established standard guidelines.    Offered to give patient  a copy of my notes     Questions were encouraged, " asked and answered to the patient's  understanding and satisfaction. Questions if any regarding current medications and side effects, need for refills and importance of compliance to medications stressed.    Reviewed available prior notes, consults, prior visits, laboratory findings, radiology and cardiology relevant reports. Updated chart as applicable. I have reviewed the patient's medical history in detail and updated the computerized patient record as relevant.      Updated patient regarding any new or relevant abnormalities on review of records or any new findings on physical exam. Mentioned to patient about purpose of visit and desirable health short and long term goals and objectives.    Primary to monitor CBC CMP Lipid panel and TSH as applicable    ___________________________________________________________________________________________________________________________________________     ECG 12 Lead    Date/Time: 1/31/2022 11:55 AM  Performed by: Kevin Garcia MD  Authorized by: Kevin Garcia MD   Comparison: compared with previous ECG from 11/6/2016  Comparison to previous ECG: new T wave inversion inferior and lateral   Rhythm: sinus rhythm  Rate: normal  T inversion: II, III, aVF, V4, V5 and V6  QRS axis: normal    Clinical impression: abnormal EKG            Assessment/Plan   Diagnoses and all orders for this visit:    1. Essential hypertension (Primary)  -     ECG 12 Lead  -     Stress Test With Myocardial Perfusion (1 Day); Future    2. Abnormal ECG  -     Adult Transthoracic Echo Complete w/ Color, Spectral and Contrast if necessary per protocol; Future    3. Class 2 severe obesity due to excess calories with serious comorbidity and body mass index (BMI) of 39.0 to 39.9 in adult (HCC)    4. History of CVA in adulthood  -     ECG 12 Lead  -     Holter Monitor - 72 Hour Up To 15 Days; Future    5. Hypersomnia    6. Mixed hyperlipidemia    7. Cryptogenic stroke (HCC) 2021          Plan    Orders  Placed This Encounter   Procedures   • Stress Test With Myocardial Perfusion (1 Day)     Standing Status:   Future     Standing Expiration Date:   2/22/2023     Order Specific Question:   What stress agent will be used?     Answer:   Regadenoson (Lexiscan)     Order Specific Question:   Difficulty walking criteria?     Answer:   Poor Exercise Tolerance     Order Specific Question:   Reason for exam?     Answer:   Other Reasons (uncertain in most circumstances)     Order Specific Question:   Other Reasons (uncertain in most circumstances)?     Answer:   High Suspicion of Cardiac Disease     Order Specific Question:   Release to patient     Answer:   Immediate   • Holter Monitor - 72 Hour Up To 15 Days     Standing Status:   Future     Standing Expiration Date:   2/22/2023     Order Specific Question:   Reason for exam?     Answer:   Other     Order Specific Question:   Other reason?     Answer:   TIA     Order Specific Question:   Release to patient     Answer:   Immediate   • ECG 12 Lead     This order was created via procedure documentation     Order Specific Question:   Release to patient     Answer:   Immediate   • Adult Transthoracic Echo Complete w/ Color, Spectral and Contrast if necessary per protocol     Myocardial strain to be performed during echocardiogram as long as technically feasible     Standing Status:   Future     Standing Expiration Date:   2/22/2023     Order Specific Question:   Reason for exam?     Answer:   Heart Failure, Cardiomyopathy, or Sytemic or Pulmonary Hypertension     Order Specific Question:   Release to patient     Answer:   Immediate   may need implantable loop recorder for cryptogenic stroke        Check BP and heart rates twice daily initially till blood pressures and heart rates under good control and then at least 3x / week,   If blood pressures continue to be well-controlled then can check week a month  at home and bring a recording for review next visit  If BP >130/85 or <  100/60 persistently over 3 reading 30 mins apart or if heart rates persistently above 100 bpm or less than 55 bpm call sooner for evaluation and advise     Resume BP meds   Due for sleep study today for suspected obstructive sleep apnea     Follow up with Tiara TROTTER , Sang TROTTER  or myself        Return in about 6 weeks (around 4/5/2022).

## 2022-02-23 RX ORDER — CLONIDINE HYDROCHLORIDE 0.1 MG/1
TABLET ORAL
Qty: 60 TABLET | Refills: 0 | Status: SHIPPED | OUTPATIENT
Start: 2022-02-23 | End: 2022-03-23

## 2022-02-23 NOTE — TELEPHONE ENCOUNTER
Pt's girlfriend LVM on nurse vm to check on the status of med refill, states pt is out of med and needs to  today.

## 2022-02-24 DIAGNOSIS — I10 ESSENTIAL HYPERTENSION: ICD-10-CM

## 2022-02-24 NOTE — TELEPHONE ENCOUNTER
Refill too soon     Rx Refill Note  Requested Prescriptions     Pending Prescriptions Disp Refills   • cloNIDine (CATAPRES) 0.1 MG tablet [Pharmacy Med Name: cloNIDine HCl 0.1 MG Oral Tablet] 60 tablet 0     Sig: Take 1 tablet by mouth twice daily      Last office visit with prescribing clinician: 12/1/2021      Next office visit with prescribing clinician: 3/3/2022            Kylie Ardon MA  02/24/22, 15:50 CST

## 2022-02-25 ENCOUNTER — OFFICE VISIT (OUTPATIENT)
Dept: NEUROLOGY | Facility: CLINIC | Age: 52
End: 2022-02-25

## 2022-02-25 VITALS
SYSTOLIC BLOOD PRESSURE: 136 MMHG | HEART RATE: 73 BPM | HEIGHT: 71 IN | DIASTOLIC BLOOD PRESSURE: 90 MMHG | BODY MASS INDEX: 40.82 KG/M2 | WEIGHT: 291.6 LBS | OXYGEN SATURATION: 98 %

## 2022-02-25 DIAGNOSIS — I10 ESSENTIAL HYPERTENSION: ICD-10-CM

## 2022-02-25 DIAGNOSIS — E78.2 MIXED HYPERLIPIDEMIA: ICD-10-CM

## 2022-02-25 DIAGNOSIS — Z86.73 HISTORY OF CVA IN ADULTHOOD: Primary | ICD-10-CM

## 2022-02-25 DIAGNOSIS — G47.33 OBSTRUCTIVE SLEEP APNEA: ICD-10-CM

## 2022-02-25 PROCEDURE — 99214 OFFICE O/P EST MOD 30 MIN: CPT | Performed by: NURSE PRACTITIONER

## 2022-02-25 RX ORDER — PRAVASTATIN SODIUM 40 MG
40 TABLET ORAL EVERY EVENING
Qty: 30 TABLET | Refills: 11 | Status: SHIPPED | OUTPATIENT
Start: 2022-02-25 | End: 2022-05-09

## 2022-02-25 RX ORDER — CLONIDINE HYDROCHLORIDE 0.1 MG/1
TABLET ORAL
Qty: 60 TABLET | Refills: 0 | OUTPATIENT
Start: 2022-02-25

## 2022-02-25 NOTE — PROGRESS NOTES
Neurology Progress Note    Reason for Consultation:    History of stroke  Sleep Apnea    Subjective     History of Present Illness:  Roscoe Frias is a 52 y.o. male who presents to the office today for history of stroke and daytime hypersomnolence.  He is routinely followed by Dr. Barbara Waters MD for primary care.  He presents today with no new signs and symptoms of stroke.  He continues to take aspirin 81mg daily but reports not taking Lipitor any longer secondary to side effects of myalgias.  He has not reached out regarding this.  BP in the office today is drastically better at 136/90; however, there have been other recent office visits where his BP has still been severely elevated.  He admits that he continues to be somewhat non-compliant with medications but wanted his BP to be good today.  I did advise to him the importance of continued BP management versus just day of appointment.  He has not had MRI or Carotid US as he was a no show for these appointments.  He is unsure why he missed them.  He did have polysomnography performed which revealed severe sleep apnea with an AHI of 65.8.  He states he does not want to wear CPAP.  I did explain to him the risks of untreated sleep apnea.  This is also likely a contributor to his uncontrolled hypertension.  He has seen Dr. Garcia who has ordered zio patch.  This test is currently underway as he has it on today.  Dr. Garcia also mentioned possible look recorder which, if zio patch was negative, I would also recommend.  He has complaints of his right knee bothering him today and is going to be seen orthopedics regarding this.  He is less active than normal as a result.  He has not had bloodwork wince September 2021.  He has no further complaints today.       Past Medical History:   Diagnosis Date   • Hypertension    • Stroke (HCC)        Allergies   Allergen Reactions   • Hctz [Hydrochlorothiazide] Other (See Comments)     Makes angry     • Magnesium-Containing  Compounds    • Toradol [Ketorolac Tromethamine]    • Tramadol      Current Outpatient Medications on File Prior to Visit   Medication Sig   • aspirin 81 MG EC tablet Take 81 mg by mouth Daily.   • cloNIDine (CATAPRES) 0.1 MG tablet Take 1 tablet by mouth twice daily   • lisinopril (PRINIVIL,ZESTRIL) 40 MG tablet Take 1 tablet by mouth twice daily   • HYDROcodone-acetaminophen (NORCO) 5-325 MG per tablet    • [DISCONTINUED] atorvastatin (Lipitor) 40 MG tablet Take 1 tablet by mouth Every Night.     No current facility-administered medications on file prior to visit.     Current outpatient and discharge medications have been reconciled for the patient.  Reviewed by: ROSE MARIE Sorensen      Family History   Problem Relation Age of Onset   • No Known Problems Mother    • No Known Problems Father    • No Known Problems Sister    • No Known Problems Brother    • No Known Problems Son    • No Known Problems Daughter    • No Known Problems Maternal Grandmother    • No Known Problems Maternal Grandfather    • No Known Problems Paternal Grandmother    • No Known Problems Paternal Grandfather    • No Known Problems Cousin    • Rheum arthritis Neg Hx    • Osteoarthritis Neg Hx    • Asthma Neg Hx    • Diabetes Neg Hx    • Heart failure Neg Hx    • Hyperlipidemia Neg Hx    • Hypertension Neg Hx    • Migraines Neg Hx    • Rashes / Skin problems Neg Hx    • Seizures Neg Hx    • Stroke Neg Hx    • Thyroid disease Neg Hx      Social History     Socioeconomic History   • Marital status:    Tobacco Use   • Smoking status: Current Every Day Smoker     Packs/day: 0.50     Types: Cigarettes     Start date: 2015   • Smokeless tobacco: Never Used   Vaping Use   • Vaping Use: Never used   Substance and Sexual Activity   • Alcohol use: No   • Drug use: No   • Sexual activity: Defer     Roscoe JES Frias  reports that he has been smoking cigarettes. He started smoking about 7 years ago. He has been smoking about 0.50 packs per day. He  has never used smokeless tobacco.. I have educated him on the risk of diseases from using tobacco products such as cancer, COPD and heart disease.     I advised him to quit and he is not willing to quit.    I spent 5 minutes counseling the patient.      Review of Systems  Review of Systems   Neurological: Positive for facial asymmetry. Negative for tremors, seizures, syncope, speech difficulty and weakness.   Psychiatric/Behavioral: Positive for sleep disturbance and stress.   All other systems reviewed and are negative.      Objective      Vital Signs  Heart Rate:  [73] 73  BP: (136)/(90) 136/90    Physical Examination:  Physical Exam  Vitals reviewed.   Constitutional:       Appearance: Normal appearance.   HENT:      Head: Normocephalic.   Eyes:      Extraocular Movements: Extraocular movements intact.      Pupils: Pupils are equal, round, and reactive to light.   Cardiovascular:      Rate and Rhythm: Normal rate and regular rhythm.      Pulses: Normal pulses.   Pulmonary:      Effort: Pulmonary effort is normal.   Musculoskeletal:         General: Normal range of motion.      Cervical back: Normal range of motion and neck supple.   Skin:     General: Skin is warm and dry.      Capillary Refill: Capillary refill takes less than 2 seconds.   Neurological:      Gait: Gait is intact.   Psychiatric:         Mood and Affect: Mood normal.         Neurologic Exam:  Mental Status:    -Awake. Alert. Oriented to person, place & time.  -No word finding difficulties.  -No aphasia.  -No dysarthria.  -Follows simple commands.     CN II:  Full visual fields with confrontation.  Pupils equally reactive to light.  CN III, IV, VI:  Extraocular muscles function intact with no nystagmus.  CN V:  Facial sensory is symmetric.  CN VII:  Facial motor asymmetric.  Flattened nasolabial fold of the right lips.  Little movement. Eyelid movement intact.  CN VIII:  Gross hearing intact bilaterally.  CN IX/X:  Palate elevates  symmetrically.  CN XI:  Shoulder shrug symmetric.  CN XII:  Tongue is midline on protrusion.     Motor: (strength out of 5:  1= minimal movement, 2 = movement in plane of gravity, 3 = movement against gravity, 4 = movement against some resistance, 5 = full strength)     -5/5 in bilateral biceps, triceps, brachioradialis, wrist extensors and intrinsic muscles of the hand.    -5/5 in bilateral hip flexors, quadriceps, hamstrings, gastrocsoleus complex, anterior tibialis and extensor hallucis longus.       Deep Tendon Reflexes:  -Right              Biceps: 2+         Triceps: 2+      Brachioradialis: 2+              Patella: 2+       Ankle: 2+         Babinski:  negative  -Left              Biceps: 2+         Triceps: 2+      Brachioradialis: 2+              Patella: 2+       Ankle: 2+         Babinski:  negative    Tone (Modified Jeffrey Scale):  No appreciable increase in tone or rigidity noted.     Sensory:  -Intact to light touch, pinprick BUE (C5-T1) and BLE (L2-S1).     Coordination:  -Finger to nose intact BUEs  -Heel to shin intact BLEs  -No ataxia     Gait  -No signs of ataxia  -ambulates unassisted    [place] Sleepiness Scale: 5  STOP-BANG: High Risk MANSOOR    Results Review:  Lab Results   Component Value Date    GLUCOSE 124 (H) 09/16/2021    BUN 19 09/16/2021    CREATININE 1.01 09/16/2021    EGFRIFNONA 78 09/16/2021    EGFRIFAFRI 94 09/16/2021    BCR 18.8 09/16/2021    K 4.1 09/16/2021    CO2 23.0 09/16/2021    CALCIUM 9.6 09/16/2021    PROTENTOTREF 7.6 09/16/2021    ALBUMIN 4.70 09/16/2021    LABIL2 1.6 09/16/2021    AST 34 09/16/2021    ALT 49 (H) 09/16/2021     Lab Results   Component Value Date    WBC 7.78 12/01/2021    HGB 17.9 (H) 12/01/2021    HCT 52.0 (H) 12/01/2021    MCV 87.1 12/01/2021     12/01/2021     Lab Results   Component Value Date    CHLPL 220 (H) 09/16/2021    TRIG 81 09/16/2021    HDL 47 09/16/2021     (H) 09/16/2021     Lab Results   Component Value Date    HGBA1C 6.40 (H)  12/01/2021      Polysomnography 4 or More Parameters (02/22/2022 20:06)      Assessment/Plan     Impression:  • History of Stroke  • Hypertension  • Hyperlipidemia  • Tobacco Abuse  • Severe Obstructive Sleep Apnea  • Memory Impairment    Plan:  · Continue Aspirin 81mg.      · Discontinue Lipitor secondary to patient not taking due to side effects.  Will place the patient on pravastatin 40mg to see how this helps.  Goal LDL still less than 70.      · Continue to control BP. The patient states that he took his medications as instructed today as to help his BP today in the office.  I did reiterate that the overall goal for his BP management is 140/90 or less.  He indicates there are still times he is routinely hitting 180mg systolic.  Documented elevated BP while in Dr. Garcia office.  Continued to reiterate importance of BP control    · His A1C is borderline and he should modify diet and exercise to ensure his A1C stays less than 7%.    · Smoking cessation recommended.  He continues to be agitated at this recommendation.  I did explain to him the importance with smoking cessation in relation to stroke risk.  He does state understanding.      · He was a no show for his MRI.  Will get this rescheduled.      · He was a no show for his carotid US.  Will get this rescheduled as I do want to assess for carotid stenosis secondary to stroke prevention and risk factor assessment.      · Polysomnography resulted with severe sleep apnea with AHI of 65.8.  I highly recommend he treat this with MANSOOR.  He is also rather frustrated over this recommendation as well. I continued to explain the risks of untreated severe sleep apnea and he states understanding and mentions he will try CPAP therapy.  The sleep lab will reach out with further recommendations.      · Will await zio patch results.  Continue to follow with Dr. Garcia.  Would continue to recommend loop recorder if zio is negative.      · Will reorder labs: CMP, CBC, Lipid Panel,  and A1C.     · He is to report to the ED with any new onset of stroke like symptoms. He and his wife both state understanding.     We did have another conversation today about the extreme importance of strict secondary stroke prevention.  He has been non-compliant in the past with smoking, BP management, and other lifestyle modifications.  He states understanding.     The patient and I have discussed the plan of care and He is in full agreement at this time.     Follow-Up:  Return in about 3 months (around 5/25/2022) for Stroke follow-up.      Sandoval Stallworth, ROSE MARIE  02/25/22  10:38 CST

## 2022-03-04 ENCOUNTER — OFFICE VISIT (OUTPATIENT)
Dept: FAMILY MEDICINE CLINIC | Facility: CLINIC | Age: 52
End: 2022-03-04

## 2022-03-04 VITALS
HEIGHT: 71 IN | RESPIRATION RATE: 16 BRPM | SYSTOLIC BLOOD PRESSURE: 163 MMHG | WEIGHT: 294.2 LBS | TEMPERATURE: 99.5 F | OXYGEN SATURATION: 98 % | DIASTOLIC BLOOD PRESSURE: 109 MMHG | HEART RATE: 80 BPM | BODY MASS INDEX: 41.19 KG/M2

## 2022-03-04 DIAGNOSIS — E66.01 CLASS 3 SEVERE OBESITY DUE TO EXCESS CALORIES WITH SERIOUS COMORBIDITY AND BODY MASS INDEX (BMI) OF 40.0 TO 44.9 IN ADULT: ICD-10-CM

## 2022-03-04 DIAGNOSIS — E11.43 TYPE 2 DIABETES MELLITUS WITH DIABETIC AUTONOMIC NEUROPATHY, WITHOUT LONG-TERM CURRENT USE OF INSULIN: ICD-10-CM

## 2022-03-04 DIAGNOSIS — I10 ESSENTIAL HYPERTENSION: ICD-10-CM

## 2022-03-04 DIAGNOSIS — E78.2 MIXED HYPERLIPIDEMIA: Primary | ICD-10-CM

## 2022-03-04 DIAGNOSIS — Z86.73 HISTORY OF CVA IN ADULTHOOD: ICD-10-CM

## 2022-03-04 PROCEDURE — 99214 OFFICE O/P EST MOD 30 MIN: CPT | Performed by: FAMILY MEDICINE

## 2022-03-04 RX ORDER — INCLISIRAN 284 MG/1.5ML
284 INJECTION, SOLUTION SUBCUTANEOUS ONCE
Qty: 1.5 ML | Refills: 0 | Status: SHIPPED | OUTPATIENT
Start: 2022-03-04 | End: 2022-03-04

## 2022-03-04 NOTE — PROGRESS NOTES
"Subjective cc: HTN   Roscoe Frias is a 52 y.o. male with HTN, HLD, obesity and h/o CVA who presents for follow up on HTN.   He was going to have surgery on his right knee - dr Julien - was canceled due to weather - he is in a lot of pain   His BP has been running good per pt at home - it is elevated here due to pain     History of Present Illness     The following portions of the patient's history were reviewed and updated as appropriate: allergies, current medications, past family history, past medical history, past social history, past surgical history and problem list.        Review of Systems   Constitutional: Positive for activity change.   Musculoskeletal: Positive for arthralgias, gait problem and myalgias.   All other systems reviewed and are negative.      Objective   Blood pressure (!) 163/109, pulse 80, temperature 99.5 °F (37.5 °C), temperature source Infrared, resp. rate 16, height 180.3 cm (71\"), weight 133 kg (294 lb 3.2 oz), SpO2 98 %.  Physical Exam  Vitals and nursing note reviewed.   Constitutional:       General: He is not in acute distress.     Appearance: He is well-developed. He is obese. He is not diaphoretic.   HENT:      Head: Normocephalic and atraumatic.      Right Ear: External ear normal.      Left Ear: External ear normal.      Nose: Nose normal.   Eyes:      General:         Right eye: No discharge.         Left eye: No discharge.      Conjunctiva/sclera: Conjunctivae normal.   Neck:      Thyroid: No thyromegaly.      Trachea: No tracheal deviation.   Cardiovascular:      Rate and Rhythm: Normal rate and regular rhythm.      Heart sounds: Normal heart sounds.   Pulmonary:      Effort: Pulmonary effort is normal. No respiratory distress.      Breath sounds: Normal breath sounds. No stridor. No wheezing.   Chest:      Chest wall: No tenderness.   Musculoskeletal:         General: Tenderness present.      Cervical back: Normal range of motion.   Lymphadenopathy:      Cervical: No " cervical adenopathy.   Skin:     General: Skin is warm and dry.   Neurological:      Mental Status: He is alert and oriented to person, place, and time.      Motor: No abnormal muscle tone.      Coordination: Coordination normal.      Gait: Gait abnormal.   Psychiatric:         Behavior: Behavior normal.         Thought Content: Thought content normal.         Judgment: Judgment normal.         Assessment/Plan   Problems Addressed this Visit        Cardiac and Vasculature    Essential hypertension    Relevant Orders    Comprehensive Metabolic Panel (Completed)    Mixed hyperlipidemia - Primary       Neuro    History of CVA in adulthood      Other Visit Diagnoses     Type 2 diabetes mellitus with diabetic autonomic neuropathy, without long-term current use of insulin ([facility])        Relevant Orders    Hemoglobin A1c (Completed)    Class 3 severe obesity due to excess calories with serious comorbidity and body mass index (BMI) of 40.0 to 44.9 in adult ([facility])          Diagnoses       Codes Comments    Mixed hyperlipidemia    -  Primary ICD-10-CM: E78.2  ICD-9-CM: 272.2     History of CVA in adulthood     ICD-10-CM: Z86.73  ICD-9-CM: V12.54     Essential hypertension     ICD-10-CM: I10  ICD-9-CM: 401.9     Type 2 diabetes mellitus with diabetic autonomic neuropathy, without long-term current use of insulin ([facility])     ICD-10-CM: E11.43  ICD-9-CM: 250.60, 337.1     Class 3 severe obesity due to excess calories with serious comorbidity and body mass index (BMI) of 40.0 to 44.9 in adult ([facility])     ICD-10-CM: E66.01, Z68.41  ICD-9-CM: 278.01, V85.41       PLAN:     #1 HTN: chronic, uncontrolled in office today, pt has been monitoring - advised to continue on current medciations, DASH diet, contact office if above goal     #2 DM: chronic, controlled, reviewed most recent labs - will repeat today     #3 obesity: Patient's Body mass index is 41.03 kg/m². indicating that he is morbidly obese (BMI > 40 or > 35 with obesity - related  health condition). Obesity-related health conditions include the following: hypertension, diabetes mellitus and dyslipidemias. Obesity is worsening. BMI is is above average; BMI management plan is completed. We discussed portion control and increasing exercise.    #4 HLD: chronic, controlled, reviewed lipdi neyda, continue on statin           This document has been electronically signed by Barbara Waters MD on March 24, 2022 22:19 CDT

## 2022-03-05 LAB
ALBUMIN SERPL-MCNC: 4.3 G/DL (ref 3.8–4.9)
ALBUMIN/GLOB SERPL: 1.4 {RATIO} (ref 1.2–2.2)
ALP SERPL-CCNC: 65 IU/L (ref 44–121)
ALT SERPL-CCNC: 27 IU/L (ref 0–44)
AST SERPL-CCNC: 17 IU/L (ref 0–40)
BILIRUB SERPL-MCNC: 0.4 MG/DL (ref 0–1.2)
BUN SERPL-MCNC: 18 MG/DL (ref 6–24)
BUN/CREAT SERPL: 17 (ref 9–20)
CALCIUM SERPL-MCNC: 9.1 MG/DL (ref 8.7–10.2)
CHLORIDE SERPL-SCNC: 105 MMOL/L (ref 96–106)
CO2 SERPL-SCNC: 18 MMOL/L (ref 20–29)
CREAT SERPL-MCNC: 1.04 MG/DL (ref 0.76–1.27)
EGFR GENE MUT ANL BLD/T: 86 ML/MIN/1.73
GLOBULIN SER CALC-MCNC: 3 G/DL (ref 1.5–4.5)
GLUCOSE SERPL-MCNC: 117 MG/DL (ref 65–99)
HBA1C MFR BLD: 6.7 % (ref 4.8–5.6)
POTASSIUM SERPL-SCNC: 4.1 MMOL/L (ref 3.5–5.2)
PROT SERPL-MCNC: 7.3 G/DL (ref 6–8.5)
SODIUM SERPL-SCNC: 144 MMOL/L (ref 134–144)

## 2022-03-08 ENCOUNTER — TELEPHONE (OUTPATIENT)
Dept: FAMILY MEDICINE CLINIC | Facility: CLINIC | Age: 52
End: 2022-03-08

## 2022-03-08 NOTE — TELEPHONE ENCOUNTER
Called [facility] to inquire, spoke with Mayra. States that they are awaiting cardiac clearance from Dr. Garcia, and pt is aware of this. Pt has testing to complete for  before clearance can be given, echo  stress test schd for 3/24/2022.

## 2022-03-08 NOTE — TELEPHONE ENCOUNTER
----- Message from Barbara Waters MD sent at 3/4/2022  2:40 PM CST -----  Pt was scheduled to have surgery on right knee with dr paz - it was canceled due to weather - he now cant get ahold of them - can you call them and see what the deal is

## 2022-03-09 ENCOUNTER — TELEPHONE (OUTPATIENT)
Dept: FAMILY MEDICINE CLINIC | Facility: CLINIC | Age: 52
End: 2022-03-09

## 2022-03-10 ENCOUNTER — DOCUMENTATION (OUTPATIENT)
Dept: CARDIOLOGY | Facility: CLINIC | Age: 52
End: 2022-03-10

## 2022-03-10 NOTE — PROGRESS NOTES
PER [facility]HY[facility] THEY NEEDED CLINICAL DOCUMENTATION.   SENT LAST OFFICE NOTE AND REQUEST IN CHART & FAXED VIA RIGHT FAX

## 2022-03-13 ENCOUNTER — TELEPHONE (OUTPATIENT)
Dept: SLEEP MEDICINE | Facility: HOSPITAL | Age: 52
End: 2022-03-13

## 2022-03-13 NOTE — TELEPHONE ENCOUNTER
Spoke to Ms. Al regarding Mr. Frias's PSG performed 02/22/2022.  Questions answered.  Dr. Jeremy Odom wrote orders for Mr. Frias to undergo a nocturnal CPAP trial.  The sleep center will contact Ms. Frias or Ms. Melendez to schedule.  Since Mr. Frias is has sleep breathing disorder, he has been placed as priority.

## 2022-03-15 ENCOUNTER — TRANSCRIBE ORDERS (OUTPATIENT)
Dept: SLEEP MEDICINE | Facility: HOSPITAL | Age: 52
End: 2022-03-15

## 2022-03-15 DIAGNOSIS — G47.33 OBSTRUCTIVE SLEEP APNEA, ADULT: Primary | ICD-10-CM

## 2022-03-16 DIAGNOSIS — Z01.812 PRE-PROCEDURE LAB EXAM: Primary | ICD-10-CM

## 2022-03-21 DIAGNOSIS — I10 ESSENTIAL HYPERTENSION: ICD-10-CM

## 2022-03-22 NOTE — TELEPHONE ENCOUNTER
Rx Refill Note  Requested Prescriptions     Pending Prescriptions Disp Refills   • cloNIDine (CATAPRES) 0.1 MG tablet [Pharmacy Med Name: cloNIDine HCl 0.1 MG Oral Tablet] 60 tablet 0     Sig: Take 1 tablet by mouth twice daily      Last office visit with prescribing clinician:  2/23/2022  Next office visit with prescribing clinician: 6/3/2022    Last refill: 2/23/2022    Hillary Hernandez MA  03/22/22, 09:12 CDT

## 2022-03-23 ENCOUNTER — HOSPITAL ENCOUNTER (OUTPATIENT)
Dept: SLEEP MEDICINE | Facility: HOSPITAL | Age: 52
End: 2022-03-23

## 2022-03-23 RX ORDER — CLONIDINE HYDROCHLORIDE 0.1 MG/1
TABLET ORAL
Qty: 60 TABLET | Refills: 0 | Status: SHIPPED | OUTPATIENT
Start: 2022-03-23 | End: 2022-04-23

## 2022-03-24 ENCOUNTER — HOSPITAL ENCOUNTER (OUTPATIENT)
Dept: CARDIOLOGY | Facility: HOSPITAL | Age: 52
End: 2022-03-24

## 2022-03-24 ENCOUNTER — APPOINTMENT (OUTPATIENT)
Dept: CARDIOLOGY | Facility: HOSPITAL | Age: 52
End: 2022-03-24

## 2022-03-24 ENCOUNTER — DOCUMENTATION (OUTPATIENT)
Dept: NEUROLOGY | Facility: CLINIC | Age: 52
End: 2022-03-24

## 2022-03-24 NOTE — PROGRESS NOTES
I was informed that patient did not want titration.  I discussed this with his sleep nurse practitioner who will contact the patient further and discuss options.

## 2022-04-20 ENCOUNTER — HOSPITAL ENCOUNTER (OUTPATIENT)
Dept: CARDIOLOGY | Facility: HOSPITAL | Age: 52
Discharge: HOME | End: 2022-04-20

## 2022-04-20 VITALS
DIASTOLIC BLOOD PRESSURE: 56 MMHG | HEART RATE: 69 BPM | BODY MASS INDEX: 40.12 KG/M2 | SYSTOLIC BLOOD PRESSURE: 137 MMHG | WEIGHT: 286.6 LBS | HEIGHT: 71 IN

## 2022-04-20 DIAGNOSIS — I10 ESSENTIAL HYPERTENSION: ICD-10-CM

## 2022-04-20 PROCEDURE — 78452 HT MUSCLE IMAGE SPECT MULT: CPT | Performed by: INTERNAL MEDICINE

## 2022-04-20 PROCEDURE — A9500 TC99M SESTAMIBI: HCPCS | Performed by: INTERNAL MEDICINE

## 2022-04-20 PROCEDURE — 25010000002 REGADENOSON 0.4 MG/5ML SOLUTION: Performed by: INTERNAL MEDICINE

## 2022-04-20 PROCEDURE — 78452 HT MUSCLE IMAGE SPECT MULT: CPT

## 2022-04-20 PROCEDURE — 93018 CV STRESS TEST I&R ONLY: CPT | Performed by: INTERNAL MEDICINE

## 2022-04-20 PROCEDURE — 0 TECHNETIUM SESTAMIBI: Performed by: INTERNAL MEDICINE

## 2022-04-20 PROCEDURE — 93017 CV STRESS TEST TRACING ONLY: CPT

## 2022-04-20 RX ADMIN — REGADENOSON 0.4 MG: 0.08 INJECTION, SOLUTION INTRAVENOUS at 10:43

## 2022-04-20 RX ADMIN — TECHNETIUM TC 99M SESTAMIBI 1 DOSE: 1 INJECTION INTRAVENOUS at 09:28

## 2022-04-20 RX ADMIN — TECHNETIUM TC 99M SESTAMIBI 1 DOSE: 1 INJECTION INTRAVENOUS at 10:54

## 2022-04-22 DIAGNOSIS — I10 ESSENTIAL HYPERTENSION: ICD-10-CM

## 2022-04-22 NOTE — TELEPHONE ENCOUNTER
Rx Refill Note  Requested Prescriptions     Pending Prescriptions Disp Refills   • cloNIDine (CATAPRES) 0.1 MG tablet [Pharmacy Med Name: cloNIDine HCl 0.1 MG Oral Tablet] 60 tablet 0     Sig: Take 1 tablet by mouth twice daily      Last office visit with prescribing clinician: 3/4/2022      Next office visit with prescribing clinician: 6/3/2022   Last Filled 03/23/22 #60 no rf            Sonja Jauregui MA  04/22/22, 08:29 CDT

## 2022-04-23 LAB
BH CV NUCLEAR PRIOR STUDY: 3
BH CV REST NUCLEAR ISOTOPE DOSE: 11.6 MCI
BH CV STRESS BP STAGE 1: NORMAL
BH CV STRESS COMMENTS STAGE 1: NORMAL
BH CV STRESS DOSE REGADENOSON STAGE 1: 0.4
BH CV STRESS DURATION MIN STAGE 1: 0
BH CV STRESS DURATION SEC STAGE 1: 10
BH CV STRESS HR STAGE 1: 96
BH CV STRESS NUCLEAR ISOTOPE DOSE: 35 MCI
BH CV STRESS PROTOCOL 1: NORMAL
BH CV STRESS RECOVERY BP: NORMAL MMHG
BH CV STRESS RECOVERY HR: 82 BPM
BH CV STRESS STAGE 1: 1
LV EF NUC BP: 73 %
MAXIMAL PREDICTED HEART RATE: 168 BPM
PERCENT MAX PREDICTED HR: 57.14 %
STRESS BASELINE BP: NORMAL MMHG
STRESS BASELINE HR: 69 BPM
STRESS PERCENT HR: 67 %
STRESS POST EXERCISE DUR MIN: 0 MIN
STRESS POST EXERCISE DUR SEC: 10 SEC
STRESS POST PEAK BP: NORMAL MMHG
STRESS POST PEAK HR: 96 BPM
STRESS TARGET HR: 143 BPM

## 2022-04-23 RX ORDER — CLONIDINE HYDROCHLORIDE 0.1 MG/1
TABLET ORAL
Qty: 60 TABLET | Refills: 0 | Status: SHIPPED | OUTPATIENT
Start: 2022-04-23 | End: 2022-05-18

## 2022-05-04 DIAGNOSIS — I10 ESSENTIAL HYPERTENSION: ICD-10-CM

## 2022-05-04 RX ORDER — LISINOPRIL 40 MG/1
TABLET ORAL
Qty: 180 TABLET | Refills: 0 | Status: SHIPPED | OUTPATIENT
Start: 2022-05-04 | End: 2022-08-01

## 2022-05-04 NOTE — TELEPHONE ENCOUNTER
Rx Refill Note  Requested Prescriptions     Pending Prescriptions Disp Refills   • lisinopril (PRINIVIL,ZESTRIL) 40 MG tablet [Pharmacy Med Name: Lisinopril 40 MG Oral Tablet] 180 tablet 0     Sig: Take 1 tablet by mouth twice daily      Last office visit with prescribing clinician: 03/04/2022  Next office visit with prescribing clinician: 06/03/2022           Amy Barraza LPN  05/04/22, 14:22 CDT

## 2022-05-05 ENCOUNTER — HOSPITAL ENCOUNTER (OUTPATIENT)
Dept: CARDIOLOGY | Facility: HOSPITAL | Age: 52
Discharge: HOME | End: 2022-05-05
Admitting: INTERNAL MEDICINE

## 2022-05-05 VITALS
HEIGHT: 71 IN | SYSTOLIC BLOOD PRESSURE: 136 MMHG | BODY MASS INDEX: 40.04 KG/M2 | WEIGHT: 286 LBS | DIASTOLIC BLOOD PRESSURE: 90 MMHG

## 2022-05-05 DIAGNOSIS — R94.31 ABNORMAL ECG: ICD-10-CM

## 2022-05-05 PROBLEM — R94.39 ABNORMAL NUCLEAR STRESS TEST: Status: ACTIVE | Noted: 2022-02-22

## 2022-05-05 PROCEDURE — 93356 MYOCRD STRAIN IMG SPCKL TRCK: CPT

## 2022-05-05 PROCEDURE — 93306 TTE W/DOPPLER COMPLETE: CPT | Performed by: INTERNAL MEDICINE

## 2022-05-05 PROCEDURE — 93306 TTE W/DOPPLER COMPLETE: CPT

## 2022-05-05 PROCEDURE — 25010000002 PERFLUTREN 6.52 MG/ML SUSPENSION: Performed by: INTERNAL MEDICINE

## 2022-05-05 PROCEDURE — 93356 MYOCRD STRAIN IMG SPCKL TRCK: CPT | Performed by: INTERNAL MEDICINE

## 2022-05-05 RX ADMIN — PERFLUTREN 8.48 MG: 6.52 INJECTION, SUSPENSION INTRAVENOUS at 10:26

## 2022-05-05 NOTE — PROGRESS NOTES
Chief Complaint  Hypertension (6wk F/U. ) and Results (Holter/Marilee/Echo)    Subjective          Roscoe Frias presents to [facility] for routine follow-up of outpatient testing.  5-day Holter monitor revealed predominantly sinus rhythm with rare supraventricular ectopic beats with APC burden less than 1%, rare premature ventricular contractions with a PVC burden of less than 1%, single 4 beat run of supraventricular tachycardia, transient Mobitz type I AV block with lowest rate of 22 bpm at 2:12 AM presumably during sleep lasting for less than 5 seconds, no significant pauses and no correlated arrhythmia.  Marilee scan on 4/20/2022 revealed small inferior lateral ischemia.  2D echo on 5/5/2022 revealed mild concentric hypertrophy, normal left ventricular systolic function with ejection fraction of 56-60%, normal left ventricular diastolic dysfunction, abnormal global longitudinal LV strain of -7% and no significant valvular heart disease.  He has hypertension, hyperlipidemia, previous cerebrovascular accident X4, TIAs, arthritis, obstructive sleep apnea and morbid obesity.  Patient denies chest pain, shortness of breath, palpitations, dizziness, syncope, orthopnea, PND, edema or decreased stamina.  Patient denies any signs of bleeding.    Hypertension  This is a chronic problem. The current episode started more than 1 year ago. The problem is controlled. Pertinent negatives include no anxiety, blurred vision, chest pain, headaches, malaise/fatigue, neck pain, orthopnea, palpitations, peripheral edema, PND, shortness of breath or sweats. Risk factors for coronary artery disease include male gender, obesity and dyslipidemia. Current antihypertension treatment includes central alpha agonists and ACE inhibitors. The current treatment provides significant improvement. Hypertensive end-organ damage includes CVA.   Hyperlipidemia  This is a chronic problem. The current episode started more than  "1 year ago. Exacerbating diseases include obesity. Pertinent negatives include no chest pain or shortness of breath. Current antihyperlipidemic treatment includes statins. Risk factors for coronary artery disease include male sex, obesity, hypertension and dyslipidemia.       Objective   Vital Signs:   /84   Pulse 72   Ht 180.3 cm (71\")   Wt 134 kg (296 lb)   SpO2 98%   BMI 41.28 kg/m²     Vitals and nursing note reviewed.   Constitutional:       General: Awake.      Appearance: Normal and healthy appearance. Well-developed and not in distress. Morbidly obese.   Eyes:      General: Lids are normal.      Conjunctiva/sclera: Conjunctivae normal.      Pupils: Pupils are equal, round, and reactive to light.   HENT:      Head: Normocephalic and atraumatic.      Nose: Nose normal.   Neck:      Vascular: No JVR. JVD normal.   Pulmonary:      Effort: Pulmonary effort is normal.      Breath sounds: Normal breath sounds. No wheezing. No rhonchi. No rales.   Chest:      Chest wall: Not tender to palpatation.   Cardiovascular:      PMI at left midclavicular line. Normal rate. Regular rhythm. Normal S1. Normal S2.      Murmurs: There is no murmur.      No gallop. No click. No rub.   Pulses:     Intact distal pulses.   Edema:     Peripheral edema absent.   Abdominal:      General: Bowel sounds are normal.      Palpations: Abdomen is soft.      Tenderness: There is no abdominal tenderness.   Musculoskeletal: Normal range of motion.         General: No tenderness.      Cervical back: Normal range of motion. Skin:     General: Skin is warm and dry.   Neurological:      General: No focal deficit present.      Mental Status: Alert, oriented to person, place, and time and oriented to person, place and time.   Psychiatric:         Attention and Perception: Attention and perception normal.         Mood and Affect: Mood and affect normal.         Speech: Speech normal.         Behavior: Behavior normal. Behavior is cooperative. "         Thought Content: Thought content normal.         Cognition and Memory: Cognition and memory normal.         Judgment: Judgment normal.        Result Review :   The following data was reviewed by: ROSE MARIE Root on 05/09/2022:  Common labs    Common Labsle 9/16/21 9/16/21 9/16/21 9/16/21 12/1/21 12/1/21 3/4/22 3/4/22    0858 0858 0858 0858 1420 1420 1351 1351   Glucose  124 (A)     117 (A)    BUN  19     18    Creatinine  1.01     1.04    eGFR Non  Am  78         eGFR African Am  94         Sodium  138     144    Potassium  4.1     4.1    Chloride  102     105    Calcium  9.6     9.1    Total Protein  7.6     7.3    Albumin  4.70     4.3    Total Bilirubin  0.5     0.4    Alkaline Phosphatase  63     65    AST (SGOT)  34     17    ALT (SGPT)  49 (A)     27    WBC 7.16    7.78      Hemoglobin 19.5 (A)    17.9 (A)      Hematocrit 59.4 (A)    52.0 (A)      Platelets 264    232      Total Cholesterol   220 (A)        Triglycerides   81        HDL Cholesterol   47        LDL Cholesterol    159 (A)        Hemoglobin A1C      6.40 (A)  6.7 (A)   PSA    0.564       (A) Abnormal value       Comments are available for some flowsheets but are not being displayed.           Data reviewed: Cardiology studies holter monitor 3/19/22, lexiscan 4/20/22 and 2d echo 5/5/22.           Assessment and Plan {CC Problem List  Visit Diagnosis  ROS  Review (Popup)  Health Maintenance  Quality  BestPractice  Medications  SmartSets  SnapShot Encounters  Media :23}   Diagnoses and all orders for this visit:    1. Essential hypertension (Primary)-blood pressure is well controlled.  Continue lisinopril and clonidine.  Monitor and record daily blood pressure. Report readings consistently higher than 130/90 or consistently lower than 100/60.     2. Mixed hyperlipidemia-management per PCP.  Continue pravastatin.    3. Class 2 severe obesity due to excess calories with serious comorbidity and body mass index (BMI)  of 39.0 to 39.9 in adult ([facility])- Class 3 Severe Obesity (BMI >=40). Obesity-related health conditions include the following: hypertension and dyslipidemias. Obesity is unchanged. BMI is is above average; no BMI management plan is appropriate. We discussed low calorie, low carb based diet program, portion control and increasing exercise.       4. Abnormal nuclear stress test- check CT angiogram of the coronary arteries.    5. Obstructive sleep apnea- pt reports non-compliance with CPAP. He has been advised of the health risks of untreated sleep apnea, including hypertension, atrial fibrillation and heart failure. He verbalized understanding.         Follow Up   Return in about 4 weeks (around 6/6/2022) for Next scheduled follow up.  Patient was given instructions and counseling regarding his condition or for health maintenance advice. Please see specific information pulled into the AVS if appropriate.

## 2022-05-07 LAB
BH CV ECHO LEFT VENTRICLE GLOBAL LONGITUDINAL STRAIN: -7 %
BH CV ECHO MEAS - AO MAX PG: 4.8 MMHG
BH CV ECHO MEAS - AO MEAN PG: 2 MMHG
BH CV ECHO MEAS - AO ROOT DIAM: 3.5 CM
BH CV ECHO MEAS - AO V2 MAX: 109 CM/SEC
BH CV ECHO MEAS - AO V2 VTI: 21.1 CM
BH CV ECHO MEAS - AVA(I,D): 2.8 CM2
BH CV ECHO MEAS - EDV(CUBED): 46.7 ML
BH CV ECHO MEAS - EDV(MOD-SP4): 119 ML
BH CV ECHO MEAS - EF(MOD-SP4): 60 %
BH CV ECHO MEAS - ESV(CUBED): 14.5 ML
BH CV ECHO MEAS - ESV(MOD-SP4): 47.6 ML
BH CV ECHO MEAS - FS: 32.2 %
BH CV ECHO MEAS - IVS/LVPW: 1.04 CM
BH CV ECHO MEAS - IVSD: 1.2 CM
BH CV ECHO MEAS - LA DIMENSION: 3.7 CM
BH CV ECHO MEAS - LAT PEAK E' VEL: 7.4 CM/SEC
BH CV ECHO MEAS - LV DIASTOLIC VOL/BSA (35-75): 48.5 CM2
BH CV ECHO MEAS - LV MASS(C)D: 137 GRAMS
BH CV ECHO MEAS - LV MAX PG: 4.2 MMHG
BH CV ECHO MEAS - LV MEAN PG: 2 MMHG
BH CV ECHO MEAS - LV SYSTOLIC VOL/BSA (12-30): 19.4 CM2
BH CV ECHO MEAS - LV V1 MAX: 102 CM/SEC
BH CV ECHO MEAS - LV V1 VTI: 19.1 CM
BH CV ECHO MEAS - LVIDD: 3.6 CM
BH CV ECHO MEAS - LVIDS: 2.44 CM
BH CV ECHO MEAS - LVOT AREA: 3.1 CM2
BH CV ECHO MEAS - LVOT DIAM: 2 CM
BH CV ECHO MEAS - LVPWD: 1.15 CM
BH CV ECHO MEAS - MED PEAK E' VEL: 6.9 CM/SEC
BH CV ECHO MEAS - MV A MAX VEL: 63 CM/SEC
BH CV ECHO MEAS - MV DEC SLOPE: 267 CM/SEC2
BH CV ECHO MEAS - MV DEC TIME: 0.26 MSEC
BH CV ECHO MEAS - MV E MAX VEL: 68.6 CM/SEC
BH CV ECHO MEAS - MV E/A: 1.09
BH CV ECHO MEAS - MV P1/2T: 76.2 MSEC
BH CV ECHO MEAS - MVA(P1/2T): 2.9 CM2
BH CV ECHO MEAS - PA V2 MAX: 98.1 CM/SEC
BH CV ECHO MEAS - PI END-D VEL: 86.6 CM/SEC
BH CV ECHO MEAS - SI(MOD-SP4): 29.1 ML/M2
BH CV ECHO MEAS - SV(LVOT): 60 ML
BH CV ECHO MEAS - SV(MOD-SP4): 71.4 ML
BH CV ECHO MEAS - TR MAX VEL: 100 CM/SEC
BH CV ECHO MEASUREMENTS AVERAGE E/E' RATIO: 9.59
LEFT ATRIUM VOLUME INDEX: 17.2 ML/M2
LEFT ATRIUM VOLUME: 42.4 CM3
MAXIMAL PREDICTED HEART RATE: 168 BPM
STRESS TARGET HR: 143 BPM

## 2022-05-09 ENCOUNTER — OFFICE VISIT (OUTPATIENT)
Dept: CARDIOLOGY | Facility: CLINIC | Age: 52
End: 2022-05-09

## 2022-05-09 VITALS
WEIGHT: 296 LBS | DIASTOLIC BLOOD PRESSURE: 84 MMHG | OXYGEN SATURATION: 98 % | SYSTOLIC BLOOD PRESSURE: 122 MMHG | HEIGHT: 71 IN | BODY MASS INDEX: 41.44 KG/M2 | HEART RATE: 72 BPM

## 2022-05-09 DIAGNOSIS — E66.01 CLASS 3 SEVERE OBESITY DUE TO EXCESS CALORIES WITH SERIOUS COMORBIDITY AND BODY MASS INDEX (BMI) OF 40.0 TO 44.9 IN ADULT: ICD-10-CM

## 2022-05-09 DIAGNOSIS — I10 ESSENTIAL HYPERTENSION: Primary | ICD-10-CM

## 2022-05-09 DIAGNOSIS — G47.33 OSA (OBSTRUCTIVE SLEEP APNEA): ICD-10-CM

## 2022-05-09 DIAGNOSIS — R94.39 ABNORMAL NUCLEAR STRESS TEST: ICD-10-CM

## 2022-05-09 DIAGNOSIS — E78.2 MIXED HYPERLIPIDEMIA: ICD-10-CM

## 2022-05-09 PROBLEM — E66.813 CLASS 3 SEVERE OBESITY DUE TO EXCESS CALORIES WITH SERIOUS COMORBIDITY AND BODY MASS INDEX (BMI) OF 40.0 TO 44.9 IN ADULT: Status: ACTIVE | Noted: 2021-11-01

## 2022-05-09 PROCEDURE — 99214 OFFICE O/P EST MOD 30 MIN: CPT | Performed by: NURSE PRACTITIONER

## 2022-05-17 DIAGNOSIS — I10 ESSENTIAL HYPERTENSION: ICD-10-CM

## 2022-05-18 RX ORDER — CLONIDINE HYDROCHLORIDE 0.1 MG/1
TABLET ORAL
Qty: 60 TABLET | Refills: 0 | Status: SHIPPED | OUTPATIENT
Start: 2022-05-18 | End: 2022-06-17

## 2022-05-18 NOTE — TELEPHONE ENCOUNTER
Rx Refill Note  Requested Prescriptions     Pending Prescriptions Disp Refills   • cloNIDine (CATAPRES) 0.1 MG tablet [Pharmacy Med Name: cloNIDine HCl 0.1 MG Oral Tablet] 60 tablet 0     Sig: Take 1 tablet by mouth twice daily      Last office visit with prescribing clinician: 11/10/2021      Next office visit with prescribing clinician: Visit date not found            Amy Barraza LPN  05/18/22, 11:17 CDT

## 2022-05-20 ENCOUNTER — HOSPITAL ENCOUNTER (OUTPATIENT)
Dept: CT IMAGING | Facility: HOSPITAL | Age: 52
Discharge: HOME | End: 2022-05-20
Admitting: NURSE PRACTITIONER

## 2022-05-20 VITALS
RESPIRATION RATE: 16 BRPM | HEART RATE: 69 BPM | TEMPERATURE: 97.9 F | SYSTOLIC BLOOD PRESSURE: 120 MMHG | DIASTOLIC BLOOD PRESSURE: 82 MMHG | BODY MASS INDEX: 41.16 KG/M2 | OXYGEN SATURATION: 95 % | WEIGHT: 294 LBS | HEIGHT: 71 IN

## 2022-05-20 DIAGNOSIS — R94.39 ABNORMAL NUCLEAR STRESS TEST: ICD-10-CM

## 2022-05-20 DIAGNOSIS — Z01.812 PRE-PROCEDURE LAB EXAM: ICD-10-CM

## 2022-05-20 LAB
CREAT SERPL-MCNC: 1.1 MG/DL (ref 0.76–1.27)
EGFRCR SERPLBLD CKD-EPI 2021: 80.8 ML/MIN/1.73

## 2022-05-20 PROCEDURE — 82565 ASSAY OF CREATININE: CPT | Performed by: INTERNAL MEDICINE

## 2022-05-20 PROCEDURE — 0 IOPAMIDOL PER 1 ML: Performed by: NURSE PRACTITIONER

## 2022-05-20 PROCEDURE — 75574 CT ANGIO HRT W/3D IMAGE: CPT | Performed by: INTERNAL MEDICINE

## 2022-05-20 PROCEDURE — 75574 CT ANGIO HRT W/3D IMAGE: CPT

## 2022-05-20 RX ORDER — METOPROLOL TARTRATE 50 MG/1
TABLET, FILM COATED ORAL
Status: COMPLETED
Start: 2022-05-20 | End: 2022-05-20

## 2022-05-20 RX ORDER — SODIUM CHLORIDE 0.9 % (FLUSH) 0.9 %
3 SYRINGE (ML) INJECTION EVERY 12 HOURS SCHEDULED
Status: DISCONTINUED | OUTPATIENT
Start: 2022-05-20 | End: 2022-05-21 | Stop reason: HOSPADM

## 2022-05-20 RX ORDER — METOPROLOL TARTRATE 50 MG/1
50 TABLET, FILM COATED ORAL ONCE AS NEEDED
Status: COMPLETED | OUTPATIENT
Start: 2022-05-20 | End: 2022-05-20

## 2022-05-20 RX ORDER — SODIUM CHLORIDE 0.9 % (FLUSH) 0.9 %
10 SYRINGE (ML) INJECTION AS NEEDED
Status: DISCONTINUED | OUTPATIENT
Start: 2022-05-20 | End: 2022-05-21 | Stop reason: HOSPADM

## 2022-05-20 RX ORDER — NITROGLYCERIN 0.4 MG/1
TABLET SUBLINGUAL
Status: COMPLETED | OUTPATIENT
Start: 2022-05-20 | End: 2022-05-20

## 2022-05-20 RX ORDER — METOPROLOL TARTRATE 100 MG/1
100 TABLET ORAL ONCE AS NEEDED
Status: COMPLETED | OUTPATIENT
Start: 2022-05-20 | End: 2022-05-20

## 2022-05-20 RX ORDER — LIDOCAINE HYDROCHLORIDE 10 MG/ML
5 INJECTION, SOLUTION EPIDURAL; INFILTRATION; INTRACAUDAL; PERINEURAL AS NEEDED
Status: DISCONTINUED | OUTPATIENT
Start: 2022-05-20 | End: 2022-05-21 | Stop reason: HOSPADM

## 2022-05-20 RX ADMIN — IOPAMIDOL 100 ML: 755 INJECTION, SOLUTION INTRAVENOUS at 09:00

## 2022-05-20 RX ADMIN — NITROGLYCERIN 0.4 MG: 0.4 TABLET SUBLINGUAL at 08:56

## 2022-05-20 RX ADMIN — METOPROLOL TARTRATE 100 MG: 50 TABLET, FILM COATED ORAL at 07:38

## 2022-05-20 RX ADMIN — NITROGLYCERIN 0.4 MG: 0.4 TABLET SUBLINGUAL at 08:49

## 2022-05-20 RX ADMIN — METOPROLOL TARTRATE 100 MG: 100 TABLET ORAL at 07:38

## 2022-05-31 ENCOUNTER — OFFICE VISIT (OUTPATIENT)
Dept: NEUROLOGY | Facility: CLINIC | Age: 52
End: 2022-05-31

## 2022-05-31 VITALS
DIASTOLIC BLOOD PRESSURE: 78 MMHG | OXYGEN SATURATION: 98 % | WEIGHT: 294 LBS | HEIGHT: 71 IN | RESPIRATION RATE: 19 BRPM | HEART RATE: 84 BPM | BODY MASS INDEX: 41.16 KG/M2 | SYSTOLIC BLOOD PRESSURE: 148 MMHG

## 2022-05-31 DIAGNOSIS — G47.33 OBSTRUCTIVE SLEEP APNEA, ADULT: ICD-10-CM

## 2022-05-31 DIAGNOSIS — E78.2 MIXED HYPERLIPIDEMIA: ICD-10-CM

## 2022-05-31 DIAGNOSIS — Z86.73 HISTORY OF CVA IN ADULTHOOD: ICD-10-CM

## 2022-05-31 DIAGNOSIS — Z86.73 HISTORY OF CVA IN ADULTHOOD: Primary | ICD-10-CM

## 2022-05-31 DIAGNOSIS — G47.19 DAYTIME HYPERSOMNOLENCE: ICD-10-CM

## 2022-05-31 DIAGNOSIS — G47.33 OBSTRUCTIVE SLEEP APNEA: ICD-10-CM

## 2022-05-31 DIAGNOSIS — I10 ESSENTIAL HYPERTENSION: ICD-10-CM

## 2022-05-31 PROCEDURE — 99214 OFFICE O/P EST MOD 30 MIN: CPT | Performed by: NURSE PRACTITIONER

## 2022-05-31 NOTE — PROGRESS NOTES
Neurology Progress Note    Reason for Consultation:    History of stroke  Sleep Apnea    Subjective     History of Present Illness:  Roscoe Frias is a 52 y.o. male who presents to the office today for history of stroke and daytime hypersomnolence.  He is routinely followed by Dr. Barbara Waters MD for primary care.  He presents today with no new signs and symptoms of stroke.  He continues to take aspirin 81mg daily and is not taking any statin secondary to myalgias and muscle cramping.  I did try him on Pravachol 40 mg daily at our last visit he reports that he is no longer taking this.  We have not had his MRI or carotid ultrasound rescheduled at this point.  He continues to be noncompliant with CPAP therapy secondary to intolerance to the mask.  He states that he simply cannot tolerate the mask and it makes him feel suffocated.  He has not tried CPAP outside of the split-night study.  He does not wish to undergo another titration study as he simply states he will not wear the machine secondary to intolerance.  I did have a lengthy discussion with him regarding the risks associated with untreated sleep apnea especially at the degree that his sleep apnea is.  We did discuss inspire and its potential availability to him.  His blood pressures been much more managed and over the course of the past month I do see readings of 136/90, 122/84, 120/82, and today's reading of 148/78.  This is drastically better and he states that he is making improvements and is much more compliant with his antihypertensive regimen.  He continues to complain of a significant amount of knee pain which he reports he is undergoing evaluations with orthopedic surgery for.  He is also undergoing cardiac work-up has recently had a CT coronary artery.  Zio patch resulted with no significant burden of atrial arrhythmias.  He continues to follow with Dr. Garcia will continue to defer his recommendations for potential loop recorder.    Past  Medical History:   Diagnosis Date   • Hypertension    • Stroke (HCC)        Allergies   Allergen Reactions   • Hctz [Hydrochlorothiazide] Other (See Comments)     Makes angry     • Magnesium-Containing Compounds    • Toradol [Ketorolac Tromethamine]    • Tramadol      Current Outpatient Medications on File Prior to Visit   Medication Sig   • aspirin 81 MG EC tablet Take 81 mg by mouth Daily.   • cloNIDine (CATAPRES) 0.1 MG tablet Take 1 tablet by mouth twice daily   • HYDROcodone-acetaminophen (NORCO) 5-325 MG per tablet    • lisinopril (PRINIVIL,ZESTRIL) 40 MG tablet Take 1 tablet by mouth twice daily     No current facility-administered medications on file prior to visit.     Current outpatient and discharge medications have been reconciled for the patient.  Reviewed by: ROSE MARIE Sorensen      Family History   Problem Relation Age of Onset   • No Known Problems Mother    • No Known Problems Father    • No Known Problems Sister    • No Known Problems Brother    • No Known Problems Son    • No Known Problems Daughter    • No Known Problems Maternal Grandmother    • No Known Problems Maternal Grandfather    • No Known Problems Paternal Grandmother    • No Known Problems Paternal Grandfather    • No Known Problems Cousin    • Rheum arthritis Neg Hx    • Osteoarthritis Neg Hx    • Asthma Neg Hx    • Diabetes Neg Hx    • Heart failure Neg Hx    • Hyperlipidemia Neg Hx    • Hypertension Neg Hx    • Migraines Neg Hx    • Rashes / Skin problems Neg Hx    • Seizures Neg Hx    • Stroke Neg Hx    • Thyroid disease Neg Hx      Social History     Socioeconomic History   • Marital status: Single   Tobacco Use   • Smoking status: Current Every Day Smoker     Packs/day: 0.50     Types: Cigarettes     Start date: 2015   • Smokeless tobacco: Never Used   Vaping Use   • Vaping Use: Never used   Substance and Sexual Activity   • Alcohol use: No   • Drug use: No   • Sexual activity: Defer     Roscoe ANDRE Frias  reports that he has  been smoking cigarettes. He started smoking about 7 years ago. He has been smoking about 0.50 packs per day. He has never used smokeless tobacco.. I have educated him on the risk of diseases from using tobacco products such as cancer, COPD and heart disease.     I advised him to quit and he is not willing to quit.    I spent 5 minutes counseling the patient.      Review of Systems  Review of Systems   Neurological: Positive for facial asymmetry. Negative for tremors, seizures, syncope, speech difficulty and weakness.   Psychiatric/Behavioral: Positive for sleep disturbance and stress.   All other systems reviewed and are negative.      Objective      Vital Signs  Heart Rate:  [84] 84  Resp:  [19] 19  BP: (148)/(78) 148/78    Physical Examination:  Physical Exam  Vitals reviewed.   Constitutional:       Appearance: Normal appearance.   HENT:      Head: Normocephalic.   Eyes:      Extraocular Movements: Extraocular movements intact.      Pupils: Pupils are equal, round, and reactive to light.   Cardiovascular:      Rate and Rhythm: Normal rate and regular rhythm.      Pulses: Normal pulses.   Pulmonary:      Effort: Pulmonary effort is normal.   Musculoskeletal:         General: Normal range of motion.      Cervical back: Normal range of motion and neck supple.   Skin:     General: Skin is warm and dry.      Capillary Refill: Capillary refill takes less than 2 seconds.   Neurological:      Gait: Gait is intact.   Psychiatric:         Mood and Affect: Mood normal.         Neurologic Exam:  Mental Status:    -Awake. Alert. Oriented to person, place & time.  -No word finding difficulties.  -No aphasia.  -No dysarthria.  -Follows simple commands.     CN II:  Full visual fields with confrontation.  Pupils equally reactive to light.  CN III, IV, VI:  Extraocular muscles function intact with no nystagmus.  CN V:  Facial sensory is symmetric.  CN VII:  Facial motor asymmetric.  Flattened nasolabial fold of the right lips.   Little movement. Eyelid movement intact.  CN VIII:  Gross hearing intact bilaterally.  CN IX/X:  Palate elevates symmetrically.  CN XI:  Shoulder shrug symmetric.  CN XII:  Tongue is midline on protrusion.     Motor: (strength out of 5:  1= minimal movement, 2 = movement in plane of gravity, 3 = movement against gravity, 4 = movement against some resistance, 5 = full strength)     -5/5 in bilateral biceps, triceps, brachioradialis, wrist extensors and intrinsic muscles of the hand.    -5/5 in bilateral hip flexors, quadriceps, hamstrings, gastrocsoleus complex, anterior tibialis and extensor hallucis longus.       Deep Tendon Reflexes:  -Right              Biceps: 2+         Triceps: 2+      Brachioradialis: 2+              Patella: 2+       Ankle: 2+         Babinski:  negative  -Left              Biceps: 2+         Triceps: 2+      Brachioradialis: 2+              Patella: 2+       Ankle: 2+         Babinski:  negative    Tone (Modified Jeffrey Scale):  No appreciable increase in tone or rigidity noted.     Sensory:  -Intact to light touch, pinprick BUE (C5-T1) and BLE (L2-S1).     Coordination:  -Finger to nose intact BUEs  -Heel to shin intact BLEs  -No ataxia     Gait  -No signs of ataxia  -ambulates unassisted    Allred Sleepiness Scale: 5  STOP-BANG: High Risk MANSOOR    Results Review:  Lab Results   Component Value Date    GLUCOSE 117 (H) 03/04/2022    BUN 18 03/04/2022    CREATININE 1.10 05/20/2022    EGFRIFNONA 78 09/16/2021    EGFRIFAFRI 94 09/16/2021    BCR 17 03/04/2022    K 4.1 03/04/2022    CO2 18 (L) 03/04/2022    CALCIUM 9.1 03/04/2022    PROTENTOTREF 7.3 03/04/2022    ALBUMIN 4.3 03/04/2022    LABIL2 1.4 03/04/2022    AST 17 03/04/2022    ALT 27 03/04/2022     Lab Results   Component Value Date    WBC 7.78 12/01/2021    HGB 17.9 (H) 12/01/2021    HCT 52.0 (H) 12/01/2021    MCV 87.1 12/01/2021     12/01/2021     Lab Results   Component Value Date    CHLPL 220 (H) 09/16/2021    TRIG 81  "09/16/2021    HDL 47 09/16/2021     (H) 09/16/2021     Lab Results   Component Value Date    HGBA1C 6.7 (H) 03/04/2022          Assessment/Plan     Impression:  • History of Stroke  • Hypertension  • Hyperlipidemia  • Tobacco Abuse  • Severe Obstructive Sleep Apnea  • Memory Impairment    Plan:  · Continue Aspirin 81mg.      · Discontinue Pravachol secondary to patient not taking due to side effects.  Will defer to his primary care provider for additional agents such as Zetia or Repatha.  I do think that he will require these medications as his LDL is significantly elevated.     · Continue to control BP.  I did again reiterate the importance of continued blood pressure management and he does state understanding.  He has been doing drastically better than our last visit with us.    · His A1C is borderline and he should modify diet and exercise to ensure his A1C stays less than 7%      · Smoking cessation recommended.  He continues to be agitated at this recommendation.  I did explain to him the importance with smoking cessation in relation to stroke risk.  He does state understanding.      · We will get his MRI and carotid ultrasound rescheduled.     · He continues to adamantly refuse to use his CPAP machine secondary to intolerance.  He simply states \"I just cannot tolerate it\".  I did discuss the possibility of doing an inspire with him today.  However, he does not eligible for this secondary to his BMI being 41.  I did explain to him that if he wished to pursue this option he would have to decrease his BMI to less than 35.  He states understanding and will think about this as a potential option.     · Continue to follow with Dr. Garcia.  Would continue to recommend loop recorder.  He is undergoing significant cardiac evaluation with stress test, echo was, and CT angiogram of the coronary arteries.  I would continue to defer this to Dr. Garcia at this time.     · Will order CBC and lipid panel at these were not " recently done.  A1c and CMP were recently ordered and resulted.     · He is to report to the ED with any new onset of stroke like symptoms. He and his wife both state understanding.     We did have another conversation today about the extreme importance of strict secondary stroke prevention.  He has been non-compliant in the past with smoking, BP management, and other lifestyle modifications.  He states understanding.     The patient and I have discussed the plan of care and He is in full agreement at this time.     Follow-Up:  Return in about 6 months (around 11/30/2022) for Stroke follow-up.      Sandoval Stallworth, ROSE MARIE  05/31/22  10:46 CDT

## 2022-06-03 ENCOUNTER — OFFICE VISIT (OUTPATIENT)
Dept: FAMILY MEDICINE CLINIC | Facility: CLINIC | Age: 52
End: 2022-06-03

## 2022-06-03 VITALS
WEIGHT: 295 LBS | SYSTOLIC BLOOD PRESSURE: 142 MMHG | OXYGEN SATURATION: 97 % | HEART RATE: 82 BPM | BODY MASS INDEX: 41.3 KG/M2 | HEIGHT: 71 IN | TEMPERATURE: 98.2 F | DIASTOLIC BLOOD PRESSURE: 88 MMHG | RESPIRATION RATE: 22 BRPM

## 2022-06-03 DIAGNOSIS — M25.561 CHRONIC PAIN OF RIGHT KNEE: Primary | ICD-10-CM

## 2022-06-03 DIAGNOSIS — G89.29 CHRONIC PAIN OF RIGHT KNEE: Primary | ICD-10-CM

## 2022-06-03 PROCEDURE — 99214 OFFICE O/P EST MOD 30 MIN: CPT | Performed by: FAMILY MEDICINE

## 2022-06-03 RX ORDER — HYDROCODONE BITARTRATE AND ACETAMINOPHEN 7.5; 325 MG/1; MG/1
1 TABLET ORAL EVERY 8 HOURS PRN
Qty: 90 TABLET | Refills: 0 | Status: SHIPPED | OUTPATIENT
Start: 2022-06-03 | End: 2022-07-01 | Stop reason: SDUPTHER

## 2022-06-03 NOTE — PROGRESS NOTES
"Subjective cc: right knee pain   Roscoe Frias is a 52 y.o. male.     History of Present Illness   The patient presents today for a follow-up. He is accompanied by an adult female who contributes to his history.    The patient complains of right knee pain. He states he is taking ibuprofen 800 mg of 3 to 5 times daily, and it is not helpful and makes him feel unwell. He states he has seen Dr. Julien in orthopedics. The patient states he underwent an EKG, and he states the nurse was \"incompetent\" and his report showed \"wrong readings\" due to faulty placement of the electrodes. He confirms he was seen by cardiology on 05/09/2022. The adult female states the patient did not receive his CTA results, and she states he has an appointment with Dr. Garcia on 06/14/2022 for surgical clearance. The patient reports he is 295 pounds and has gained 50 pounds due to lack of activity secondary to knee pain. He notes he typically weighs 250 pounds, and he states he does not have access to a pool. He states he would like medication for pain relief where he can get out and go for a hamburger with his dog. The adult female states the patient moans from pain while he is asleep.     The following portions of the patient's history were reviewed and updated as appropriate: allergies, current medications, past family history, past medical history, past social history, past surgical history and problem list.        Review of Systems  A review of systems was performed, and pertinent findings are noted in the HPI.    Objective   Blood pressure 142/88, pulse 82, temperature 98.2 °F (36.8 °C), temperature source Infrared, resp. rate 22, height 180.3 cm (71\"), weight 134 kg (295 lb), SpO2 97 %.  Physical Exam  Vitals and nursing note reviewed.   Constitutional:       General: He is not in acute distress.     Appearance: He is well-developed. He is obese. He is not diaphoretic.   HENT:      Head: Normocephalic and atraumatic.      Right Ear: " External ear normal.      Left Ear: External ear normal.      Nose: Nose normal.   Eyes:      General:         Right eye: No discharge.         Left eye: No discharge.      Conjunctiva/sclera: Conjunctivae normal.   Neck:      Thyroid: No thyromegaly.      Trachea: No tracheal deviation.   Cardiovascular:      Rate and Rhythm: Normal rate and regular rhythm.      Heart sounds: Normal heart sounds.   Pulmonary:      Effort: Pulmonary effort is normal. No respiratory distress.      Breath sounds: Normal breath sounds. No stridor. No wheezing.   Chest:      Chest wall: No tenderness.   Musculoskeletal:         General: Tenderness present.      Right knee: Bony tenderness present. Decreased range of motion. Tenderness present.   Skin:     General: Skin is warm and dry.   Neurological:      Mental Status: He is alert and oriented to person, place, and time.      Motor: No abnormal muscle tone.      Coordination: Coordination normal.      Gait: Gait abnormal.   Psychiatric:         Behavior: Behavior normal.         Thought Content: Thought content normal.         Judgment: Judgment normal.         Assessment & Plan   Problems Addressed this Visit    None     Visit Diagnoses     Chronic pain of right knee    -  Primary  - The patient has been trying to get a right knee replacement; however, he has not been cleared from a cardiology standpoint.  - The patient is in significant pain.  - I feel it is of more benefit for the patient to use pain medication as needed as opposed to needing ibuprofen consistently due to the potential cardiac risk as well as GI upset.  - A prescription was given for pain medication.  - CROW reviewed.  - He was advised on the risks and benefits of medication.    Relevant Medications    HYDROcodone-acetaminophen (NORCO) 7.5-325 MG per tablet        Hypertension: Chronic, stable.       - This is borderline-controlled.       - He will continue on current medications.         CAD: Chronic,  uncontrolled.       - Reviewed patient's CT angiogram.       - I have concerns about the degree of stenosis seen on the scan.       - I have advised the patient to keep his follow-up with cardiology to discuss these further.   Diagnoses       Codes Comments    Chronic pain of right knee    -  Primary ICD-10-CM: M25.561, G89.29  ICD-9-CM: 719.46, 338.29                   Transcribed from ambient dictation for Barbara Waters MD by Dena De LaC ruz.  06/03/22   13:08 CDT    Patient verbalized consent to the visit recording.        This document has been electronically signed by Barbara Waters MD on June 9, 2022 16:43 CDT

## 2022-06-08 ENCOUNTER — TELEPHONE (OUTPATIENT)
Dept: FAMILY MEDICINE CLINIC | Facility: CLINIC | Age: 52
End: 2022-06-08

## 2022-06-14 ENCOUNTER — OFFICE VISIT (OUTPATIENT)
Dept: CARDIOLOGY | Facility: CLINIC | Age: 52
End: 2022-06-14

## 2022-06-14 VITALS
DIASTOLIC BLOOD PRESSURE: 102 MMHG | SYSTOLIC BLOOD PRESSURE: 144 MMHG | HEIGHT: 71 IN | HEART RATE: 95 BPM | OXYGEN SATURATION: 97 % | WEIGHT: 299 LBS | BODY MASS INDEX: 41.86 KG/M2

## 2022-06-14 DIAGNOSIS — E78.2 MIXED HYPERLIPIDEMIA: ICD-10-CM

## 2022-06-14 DIAGNOSIS — R94.39 ABNORMAL NUCLEAR STRESS TEST: Primary | ICD-10-CM

## 2022-06-14 DIAGNOSIS — G47.33 OSA (OBSTRUCTIVE SLEEP APNEA): ICD-10-CM

## 2022-06-14 DIAGNOSIS — I10 ESSENTIAL HYPERTENSION: ICD-10-CM

## 2022-06-14 DIAGNOSIS — I63.9 CRYPTOGENIC STROKE: ICD-10-CM

## 2022-06-14 DIAGNOSIS — Z86.73 HISTORY OF CVA IN ADULTHOOD: ICD-10-CM

## 2022-06-14 PROCEDURE — 99214 OFFICE O/P EST MOD 30 MIN: CPT | Performed by: INTERNAL MEDICINE

## 2022-06-14 RX ORDER — ROSUVASTATIN CALCIUM 5 MG/1
5 TABLET, COATED ORAL DAILY
Qty: 90 TABLET | Refills: 3 | Status: SHIPPED | OUTPATIENT
Start: 2022-06-14 | End: 2022-07-01 | Stop reason: SINTOL

## 2022-06-14 RX ORDER — SODIUM CHLORIDE 9 MG/ML
75 INJECTION, SOLUTION INTRAVENOUS CONTINUOUS
Status: CANCELLED | OUTPATIENT
Start: 2022-06-14

## 2022-06-14 NOTE — PROGRESS NOTES
Roscoe Frias  [id number]  1970  52 y.o.  male    Referring Provider: Barbara Waters MD    Reason for  Visit:    Here for routine follow up   Initial visit for  preoperative cardiovascular clearance under general anesthesia for right total knee replacement    Prior cerebrovascular accident   Preoperative cardiovascular clearance under general anesthesia for knee surgery     Subjective      Overall feels the same   No new events or complaints since last visit   Overall the patient feels no major change from baseline symptoms   Similar symptoms as during last visit      Mild chronic exertional shortness of breath on exertion relieved with rest  No significant cough or wheezing    No palpitations  No associated chest pain  No significant pedal edema    No fever or chills  No significant expectoration    No hemoptysis  No presyncope or syncope    Tolerating current medications well with no untoward side effects   Compliant with prescribed medication regimen. Tries to adhere to cardiac diet.     Poor effort tolerance  Effort tolerance limited more by orthopedic rather than cardiac related issues, therefore difficult to assess functional capacity.      No presyncope or syncope  Compliant with medications    Tolerating current medications well with no untoward side effects   Compliant with prescribed medication regimen. Tries to adhere to cardiac diet.      BP well controlled at home.     Today elevated     Has moderate snoring with daytime fatigue    Recent  ECG shows new T wave inversion inferior lateral as noted below     Preoperative cardiovascular clearance under general anesthesia for knee surgery          History of present illness:  Roscoe Frias is a 52 y.o. yo male with essential hypertension    who presents today for   Chief Complaint   Patient presents with   • Follow-up     CTA- RESULTS    .    History  Past Medical History:   Diagnosis Date   • Hypertension    • Stroke (HCC)    ,   Past  Surgical History:   Procedure Laterality Date   • BACK SURGERY     • NECK SURGERY     ,   Family History   Problem Relation Age of Onset   • No Known Problems Mother    • No Known Problems Father    • No Known Problems Sister    • No Known Problems Brother    • No Known Problems Son    • No Known Problems Daughter    • No Known Problems Maternal Grandmother    • No Known Problems Maternal Grandfather    • No Known Problems Paternal Grandmother    • No Known Problems Paternal Grandfather    • No Known Problems Cousin    • Rheum arthritis Neg Hx    • Osteoarthritis Neg Hx    • Asthma Neg Hx    • Diabetes Neg Hx    • Heart failure Neg Hx    • Hyperlipidemia Neg Hx    • Hypertension Neg Hx    • Migraines Neg Hx    • Rashes / Skin problems Neg Hx    • Seizures Neg Hx    • Stroke Neg Hx    • Thyroid disease Neg Hx    ,   Social History     Tobacco Use   • Smoking status: Current Every Day Smoker     Packs/day: 0.50     Types: Cigarettes     Start date: 2015   • Smokeless tobacco: Never Used   Vaping Use   • Vaping Use: Never used   Substance Use Topics   • Alcohol use: No   • Drug use: No   ,     Medications  Current Outpatient Medications   Medication Sig Dispense Refill   • aspirin 81 MG EC tablet Take 81 mg by mouth Daily.     • cloNIDine (CATAPRES) 0.1 MG tablet Take 1 tablet by mouth twice daily 60 tablet 0   • HYDROcodone-acetaminophen (NORCO) 7.5-325 MG per tablet Take 1 tablet by mouth Every 8 (Eight) Hours As Needed for Moderate Pain . 90 tablet 0   • lisinopril (PRINIVIL,ZESTRIL) 40 MG tablet Take 1 tablet by mouth twice daily 180 tablet 0   • rosuvastatin (CRESTOR) 5 MG tablet Take 1 tablet by mouth Daily. 90 tablet 3     No current facility-administered medications for this visit.       Allergies:  Hctz [hydrochlorothiazide], Magnesium-containing compounds, Toradol [ketorolac tromethamine], and Tramadol    Review of Systems  Review of Systems   Constitutional: Negative.   HENT: Negative.    Eyes: Negative.   "  Cardiovascular: Positive for dyspnea on exertion. Negative for chest pain, claudication, cyanosis, irregular heartbeat, leg swelling, near-syncope, orthopnea, palpitations, paroxysmal nocturnal dyspnea and syncope.   Respiratory: Negative.    Endocrine: Negative.    Hematologic/Lymphatic: Negative.    Skin: Negative.    Musculoskeletal: Positive for arthritis and joint pain.   Gastrointestinal: Negative for anorexia.   Genitourinary: Negative.    Neurological: Negative.    Psychiatric/Behavioral: Negative.        Objective     Physical Exam:  BP (!) 144/102   Pulse 95   Ht 180.3 cm (71\")   Wt 136 kg (299 lb)   SpO2 97%   BMI 41.70 kg/m²     Physical Exam  Constitutional:       Appearance: He is well-developed.   HENT:      Head: Normocephalic.   Neck:      Vascular: Normal carotid pulses. No carotid bruit or JVD.      Trachea: No tracheal tenderness or tracheal deviation.   Cardiovascular:      Rate and Rhythm: Regular rhythm.      Pulses: Normal pulses.      Heart sounds: Murmur heard.    Systolic murmur is present with a grade of 2/6.  Pulmonary:      Effort: Pulmonary effort is normal.      Breath sounds: No stridor.   Abdominal:      Palpations: Abdomen is soft.   Musculoskeletal:      Cervical back: No edema.   Skin:     General: Skin is warm.   Neurological:      Mental Status: He is alert.      Cranial Nerves: No cranial nerve deficit.      Sensory: No sensory deficit.   Psychiatric:         Speech: Speech normal.         Behavior: Behavior normal.         Results Review:      Roscoe Frias  HOLTER MONITOR >7 DAYS UP TO 15 DAYS HOOK-UP & INTERP (CLINIC)  Order# [id number]  Reading physician: Kevin Garcia MD Ordering physician: Kevin Garcia MD Study date: 22       Patient Information    Patient Name   Roscoe Frias MRN   [id number] Legal Sex   Male  (Age)   1970 (52 y.o.)     Interpretation Summary       · Average HR: 78. Min HR: 43. Max HR: 120.     · Entire report was reviewed.  " Monitoring in days as noted below  · The predominant rhythm noted during the testing period was sinus with rates as above while in sinus rhythm.     · Rare supraventricular ectopics with an APC burden of: < 1%    · Rare premature ventricular contractions with a PVC burden of: < 1%     · No correlated arrhythmia  · No significant pauses                  Conclusion:      Baseline rhythm is sinus.  Slowest heart rate while in sinus rhythm of 43 bpm at 1:42 AM consists of marked sinus bradycardia presumably during sleep  No significant ectopy   Single 4 beat run of supraventricular tachycardia at a maximum rate of 179 bpm at 1:12 AM  Transient Mobitz type I AV block noted with the lowest rate of 22 bpm at 2:12 AM presumably during sleep lasting for less than 5 seconds           Roscoe Frias  Regadenoson Stress Test With Myocardial Perfusion SPECT (Multi Study)  Order# [id number]  Reading physician: Kevin Garcia MD Ordering physician: Kevin Garcia MD Study date: 22       Patient Information    Patient Name   Roscoe Frias MRN   [id number] Legal Sex   Male  (Age)   1970 (52 y.o.)         Interpretation Summary    · Diaphragmatic attenuation artifact is present.  · Raw images reviewed with the following abnormalities noted: Slight vertical motion artifact noted.  · Left ventricular ejection fraction is hyperdynamic (Calculated EF > 70%). .  · Small inferior lateral area of ischemia  · Report delayed as nuclear computer processing software earlier was not working         Cardiac CT angiography 2021     Impression:      Total calcium score (using PHOENIX calcium score calculator): Left main 3.9, .1, LCx 6.4, .6 and total 546  This is markedly elevated and at the 98th percentile for matched population        Coronary angiography:     Normal left main coronary artery  Proximal left anterior descending coronary artery is normal  Mid left anterior descending coronary artery has 40 to 50%  stenosis  Prominent misregistration artifact in the mid segment of the mid left anterior descending coronary artery  Focal calcification with 40 to 50% stenosis of the proximal right coronary artery  No significant disease of the proximal to mid right coronary artery.  No obstructive disease of the left circumflex coronary artery or obtuse marginal branches noted.  Distal right coronary arteries not well visualized but the right posterior descending coronary artery appears to have a mid segment 60 to 70% stenosis  Misregistration artifacts as well as morbid obesity decrease overall accuracy of the test     ___________________________________________________________________________     Recommendations:        Based on above findings with very high calcium score recommend coronary angiography along with guideline directed medical therapy for coronary artery disease  Prior CT of the chest there is mention of pulmonary nodules for which interim CT chest was advised by radiology but does not appear to have been performed  Would recommend CT chest without contrast     Results for orders placed during the hospital encounter of 05/05/22    Adult Transthoracic Echo Complete w/ Color, Spectral and Contrast if necessary per protocol    Interpretation Summary  · Left ventricular wall thickness is consistent with mild concentric hypertrophy.  · Left ventricular ejection fraction appears to be 56 - 60%. Left ventricular systolic function is normal.  · Left ventricular diastolic function was normal.  · Abnormal global longitudinal LV strain (GLS) = -7.0%.  · Estimated right ventricular systolic pressure from tricuspid regurgitation is normal (<35 mmHg).        ____________________________________________________________________________________________________________________________________________  Health maintenance and recommendations    Low salt/ HTN/ Heart healthy carbohydrate restricted cardiac diet   The patient is advised  to reduce or avoid caffeine or other cardiac stimulants.   Minimize or avoid  NSAID-type medications      Monitor for any signs of bleeding including red or dark stools. Fall precautions.   Advised staying uptodate with immunizations per established standard guidelines.    Offered to give patient  a copy of my notes     Questions were encouraged, asked and answered to the patient's  understanding and satisfaction. Questions if any regarding current medications and side effects, need for refills and importance of compliance to medications stressed.    Reviewed available prior notes, consults, prior visits, laboratory findings, radiology and cardiology relevant reports. Updated chart as applicable. I have reviewed the patient's medical history in detail and updated the computerized patient record as relevant.      Updated patient regarding any new or relevant abnormalities on review of records or any new findings on physical exam. Mentioned to patient about purpose of visit and desirable health short and long term goals and objectives.    Primary to monitor CBC CMP Lipid panel and TSH as applicable    ___________________________________________________________________________________________________________________________________________   Procedures    Assessment & Plan   Diagnoses and all orders for this visit:    1. Abnormal nuclear stress test (Primary)  -     CBC and Differential; Future  -     Comprehensive metabolic panel; Future  -     Protime-INR; Future  -     XR chest pa and lateral; Future  -     Case Request Cath Lab: Cardiac Catheterization/Vascular Study  DAYSI OK   He will wait for knee replacement for 1 year    2. Cryptogenic stroke (HCC) 2021  -     Loop Recorder Implant - Treatment Room; Future  -     US Carotid Bilateral; Future    3. Essential hypertension    4. History of CVA in adulthood    5. Mixed hyperlipidemia    6. MANSOOR (obstructive sleep apnea)    Other orders  -     Instruct Patient To Stop  Apixaban, Rivaroxaban, Edoxaban, Dibigatran, Vorapaxar, Atopaxar 48 Hours Before Scheduled Cardiac Procedure; Future  -     Instruct Patient To Stop Metformin (including any combination product containing Metformin) 48 Hours Before Scheduled Cardiac Procedure; Future  -     rosuvastatin (CRESTOR) 5 MG tablet; Take 1 tablet by mouth Daily.  Dispense: 90 tablet; Refill: 3          Plan      Patient expressed understanding  Encouraged and answered all questions   Discussed with the patient and all questioned fully answered. He will call me if any problems arise.   Discussed results of prior testing with patient : echo, cardiac CTA and outpatient cardiac telemetry     Orders Placed This Encounter   Procedures   • XR chest pa and lateral     Standing Status:   Future     Standing Expiration Date:   6/14/2023     Order Specific Question:   Reason for Exam:     Answer:   cad   • US Carotid Bilateral     Standing Status:   Future     Standing Expiration Date:   6/14/2023     Order Specific Question:   Reason for Exam:     Answer:   cva   • Comprehensive metabolic panel     Standing Status:   Future     Standing Expiration Date:   6/14/2023     Order Specific Question:   Release to patient     Answer:   Immediate   • Protime-INR     Standing Status:   Future     Standing Expiration Date:   6/14/2023   • Instruct Patient To Stop Apixaban, Rivaroxaban, Edoxaban, Dibigatran, Vorapaxar, Atopaxar 48 Hours Before Scheduled Cardiac Procedure     Instruct Patient To Stop Apixaban, Rivaroxaban, Edoxaban, Dibigatran, Vorapaxar, Atopaxar 48 Hours Before Scheduled Cardiac Procedure     Standing Status:   Future     Standing Expiration Date:   6/14/2023   • Instruct Patient To Stop Metformin (including any combination product containing Metformin) 48 Hours Before Scheduled Cardiac Procedure     Instruct Patient To Stop Metformin (including any combination product containing Metformin) 48 Hours Before Scheduled Cardiac Procedure      Standing Status:   Future     Standing Expiration Date:   6/14/2023   • Loop Recorder Implant - Treatment Room     Standing Status:   Future     Standing Expiration Date:   6/14/2023     Order Specific Question:   What type of sedation does the patient require?     Answer:   Other     Order Specific Question:   Other (comment)     Answer:   local     Order Specific Question:   Reason for Exam:     Answer:   cryptogenic stroke   • CBC and Differential     Standing Status:   Future     Standing Expiration Date:   6/14/2023     Order Specific Question:   Manual Differential     Answer:   No        Check BP and heart rates twice daily initially till blood pressures and heart rates under good control and then at least 3x / week,   If blood pressures continue to be well-controlled then can check week a month  at home and bring a recording for review next visit  If BP >130/85 or < 100/60 persistently over 3 reading 30 mins apart or if heart rates persistently above 100 bpm or less than 55 bpm call sooner for evaluation and advise     Start statin therapy   Requested Prescriptions     Signed Prescriptions Disp Refills   • rosuvastatin (CRESTOR) 5 MG tablet 90 tablet 3     Sig: Take 1 tablet by mouth Daily.        Keep LDL below 70 mg/dl. Monitor liver and renal functions.   Monitor CBC, CMP, TSH (as indicated) and Lipid Panel by primary       Resume BP meds   Due for sleep study today for suspected obstructive sleep apnea     Follow up with Tiara TROTTER , Sang TROTTER, Ana Rowell PA-C  or myself        Return in about 6 weeks (around 7/26/2022).

## 2022-06-17 DIAGNOSIS — I10 ESSENTIAL HYPERTENSION: ICD-10-CM

## 2022-06-17 RX ORDER — CLONIDINE HYDROCHLORIDE 0.1 MG/1
TABLET ORAL
Qty: 60 TABLET | Refills: 3 | Status: SHIPPED | OUTPATIENT
Start: 2022-06-17 | End: 2022-10-24

## 2022-06-17 NOTE — TELEPHONE ENCOUNTER
Rx Refill Note  Requested Prescriptions     Pending Prescriptions Disp Refills   • cloNIDine (CATAPRES) 0.1 MG tablet [Pharmacy Med Name: cloNIDine HCl 0.1 MG Oral Tablet] 60 tablet 0     Sig: Take 1 tablet by mouth twice daily      Last office visit with prescribing clinician: 6/3/2022      Next office visit with prescribing clinician: Visit date not found     CPE done 12/02/2021    {TIP  Please add Last Relevant Lab Date if appropriate: 06/14/2022    {TIP  Is Refill Pharmacy correct?: yes    Verna Luis MA  06/17/22, 15:01 CDT

## 2022-06-20 ENCOUNTER — HOSPITAL ENCOUNTER (OUTPATIENT)
Dept: ULTRASOUND IMAGING | Facility: HOSPITAL | Age: 52
Discharge: HOME | End: 2022-06-20
Admitting: INTERNAL MEDICINE

## 2022-06-20 DIAGNOSIS — I63.9 CRYPTOGENIC STROKE: ICD-10-CM

## 2022-06-20 PROCEDURE — 93880 EXTRACRANIAL BILAT STUDY: CPT

## 2022-06-28 DIAGNOSIS — M25.561 CHRONIC PAIN OF RIGHT KNEE: ICD-10-CM

## 2022-06-28 DIAGNOSIS — G89.29 CHRONIC PAIN OF RIGHT KNEE: ICD-10-CM

## 2022-06-30 RX ORDER — HYDROCODONE BITARTRATE AND ACETAMINOPHEN 7.5; 325 MG/1; MG/1
1 TABLET ORAL EVERY 8 HOURS PRN
Qty: 90 TABLET | Refills: 0 | OUTPATIENT
Start: 2022-06-30

## 2022-07-01 ENCOUNTER — OFFICE VISIT (OUTPATIENT)
Dept: FAMILY MEDICINE CLINIC | Facility: CLINIC | Age: 52
End: 2022-07-01

## 2022-07-01 VITALS
SYSTOLIC BLOOD PRESSURE: 150 MMHG | HEART RATE: 88 BPM | RESPIRATION RATE: 24 BRPM | WEIGHT: 297.2 LBS | HEIGHT: 71 IN | DIASTOLIC BLOOD PRESSURE: 102 MMHG | BODY MASS INDEX: 41.61 KG/M2 | TEMPERATURE: 97.5 F | OXYGEN SATURATION: 98 %

## 2022-07-01 DIAGNOSIS — Z79.899 MEDICATION MANAGEMENT: ICD-10-CM

## 2022-07-01 DIAGNOSIS — M25.561 CHRONIC PAIN OF RIGHT KNEE: Primary | ICD-10-CM

## 2022-07-01 DIAGNOSIS — F17.200 SMOKER: ICD-10-CM

## 2022-07-01 DIAGNOSIS — I10 ESSENTIAL HYPERTENSION: ICD-10-CM

## 2022-07-01 DIAGNOSIS — G89.29 CHRONIC PAIN OF RIGHT KNEE: Primary | ICD-10-CM

## 2022-07-01 DIAGNOSIS — E66.01 CLASS 3 SEVERE OBESITY DUE TO EXCESS CALORIES WITH SERIOUS COMORBIDITY AND BODY MASS INDEX (BMI) OF 40.0 TO 44.9 IN ADULT: ICD-10-CM

## 2022-07-01 DIAGNOSIS — E78.2 MIXED HYPERLIPIDEMIA: ICD-10-CM

## 2022-07-01 PROCEDURE — 99214 OFFICE O/P EST MOD 30 MIN: CPT | Performed by: FAMILY MEDICINE

## 2022-07-01 RX ORDER — SIMVASTATIN 10 MG
10 TABLET ORAL NIGHTLY
Qty: 30 TABLET | Refills: 2 | Status: SHIPPED | OUTPATIENT
Start: 2022-07-01 | End: 2022-07-13 | Stop reason: HOSPADM

## 2022-07-01 RX ORDER — HYDROCODONE BITARTRATE AND ACETAMINOPHEN 7.5; 325 MG/1; MG/1
1 TABLET ORAL EVERY 8 HOURS PRN
Qty: 90 TABLET | Refills: 0 | Status: SHIPPED | OUTPATIENT
Start: 2022-07-01 | End: 2022-08-01 | Stop reason: SDUPTHER

## 2022-07-01 NOTE — PROGRESS NOTES
"Subjective cc: chronic knee pain   Roscoe Frias is a 52 y.o. male.     History of Present Illness     The patient presents today for a follow-up.    The patient states he went to see cardiology, and they went through all of his results with him. He is scheduled for a heart catheterization on 07/12/2022 with Dr. Garcia. The patient states he does not understand why he has to have the heart catheterization performed, and would like it explained to him. Additionally, his calcium score is high. The patient states they were going to put the monitor in on 07/07/2022. His knee surgery has to be delayed by 1 year because all of the medications he takes for his heart they will not allow him to stop taking for the procedure for 1 year since it is an elective procedure.     He states he prefers seeing me to manage his pain medication on a monthly basis.     The patient states he has not taken Crestor for 6 to 7 days because he states \"I don't feel right.\" He adds that he does not know how to explain it to me. The patient states he gets a headache sometimes, and abdominal pain when he takes Crestor. He states he takes it before he goes to bed. He adds that he will wake up at 3:00 AM with a \"splitting\" headache. He has tried pravastatin, but has not tolerated the medication.    The patient states he smokes one-fourth to one-half pack of cigarettes per day intermittently throughout the month, and is working toward trying to quit.       The following portions of the patient's history were reviewed and updated as appropriate: allergies, current medications, past family history, past medical history, past social history, past surgical history and problem list.        Review of Systems   Constitutional: Negative for chills, diaphoresis, fatigue and fever.   HENT: Negative for congestion, ear pain, rhinorrhea and sore throat.    Eyes: Negative for visual disturbance.   Respiratory: Negative for cough, chest tightness, shortness of " "breath and wheezing.    Cardiovascular: Negative for chest pain, palpitations and leg swelling.   Gastrointestinal: Negative for abdominal distention, abdominal pain, blood in stool, constipation, diarrhea, nausea and vomiting.   Endocrine: Negative for polydipsia, polyphagia and polyuria.   Genitourinary: Negative for decreased urine volume, difficulty urinating, dysuria and urgency.   Musculoskeletal: Negative for arthralgias, back pain, gait problem, joint swelling and myalgias.   Skin: Negative for color change, rash and wound.   Neurological: Negative for dizziness, tremors, syncope, weakness, light-headedness, numbness and headaches.   Psychiatric/Behavioral: Negative for confusion, sleep disturbance and suicidal ideas. The patient is not nervous/anxious and is not hyperactive.        Objective   Blood pressure (!) 150/102, pulse 88, temperature 97.5 °F (36.4 °C), temperature source Infrared, resp. rate 24, height 180.3 cm (71\"), weight 135 kg (297 lb 3.2 oz), SpO2 98 %.  Physical Exam  Vitals and nursing note reviewed.   Constitutional:       General: He is not in acute distress.     Appearance: He is well-developed. He is obese. He is not diaphoretic.   HENT:      Head: Normocephalic and atraumatic.      Nose: Nose normal.   Eyes:      General:         Right eye: No discharge.         Left eye: No discharge.      Conjunctiva/sclera: Conjunctivae normal.   Neck:      Thyroid: No thyromegaly.      Trachea: No tracheal deviation.   Cardiovascular:      Rate and Rhythm: Normal rate and regular rhythm.      Heart sounds: Normal heart sounds.   Pulmonary:      Effort: Pulmonary effort is normal. No respiratory distress.      Breath sounds: Normal breath sounds. No stridor. No wheezing.   Chest:      Chest wall: No tenderness.   Abdominal:      General: Bowel sounds are normal. There is no distension.      Palpations: Abdomen is soft.      Tenderness: There is no abdominal tenderness.   Musculoskeletal:         " General: Tenderness present.      Cervical back: Normal range of motion.      Right knee: Decreased range of motion. Tenderness present.      Left knee: Decreased range of motion. Tenderness present.   Lymphadenopathy:      Cervical: No cervical adenopathy.   Skin:     General: Skin is warm and dry.   Neurological:      Mental Status: He is alert and oriented to person, place, and time.      Motor: No abnormal muscle tone.      Coordination: Coordination normal.      Gait: Gait abnormal.   Psychiatric:         Behavior: Behavior normal.         Thought Content: Thought content normal.         Judgment: Judgment normal.         Assessment & Plan   Problems Addressed this Visit        Cardiac and Vasculature    Essential hypertension    Mixed hyperlipidemia    Relevant Medications    simvastatin (Zocor) 10 MG tablet       Endocrine and Metabolic    Class 3 severe obesity due to excess calories with serious comorbidity and body mass index (BMI) of 40.0 to 44.9 in adult ([facility])      Other Visit Diagnoses     Chronic pain of right knee    -  Primary    Relevant Medications    HYDROcodone-acetaminophen (NORCO) 7.5-325 MG per tablet    Medication management        Relevant Orders    ToxASSURE Select 13 (MW) - Urine, Clean Catch    Smoker          Diagnoses       Codes Comments    Chronic pain of right knee    -  Primary ICD-10-CM: M25.561, G89.29  ICD-9-CM: 719.46, 338.29     Essential hypertension     ICD-10-CM: I10  ICD-9-CM: 401.9     Mixed hyperlipidemia     ICD-10-CM: E78.2  ICD-9-CM: 272.2     Class 3 severe obesity due to excess calories with serious comorbidity and body mass index (BMI) of 40.0 to 44.9 in adult ([facility])     ICD-10-CM: E66.01, Z68.41  ICD-9-CM: 278.01, V85.41     Medication management     ICD-10-CM: Z79.899  ICD-9-CM: V58.69     Smoker     ICD-10-CM: F17.200  ICD-9-CM: 305.1         1. CAD: Chronic, uncontrolled.  - Reviewed patient's cardiac studies with him.  - Tried to help him better understand what  was going on, and as to why it is important that he have his heart cath completed.  - Discussed medication compliance.  - Advised patient to quit smoking.    2. Hypertension: Chronic, uncontrolled.  - Continue on current medication.  - Advised to quit smoking.  - Advised on lifestyle changes.  - We will continue to monitor.  - Patient following with cardiology.    3. Hyperlipidemia: Chronic, uncontrolled.  - Patient reports he cannot take Crestor secondary to side effects.  - Medication taken off his list.  - Patient has previously tried, and failed atorvastatin, and pravastatin.  - We will change patient to simvastatin.  - Advised on low-cholesterol diet.    4. Chronic knee pain: Chronic, uncontrolled.  - The patient was given a refill on pain medication.  - Advised on risk, and benefits of controlled medication.  - Explained controlled contract.  - Controlled contract to be signed today.  - UDS to be obtained today.  - Patient is agreeable to come to office once per month for a year over this time period to help with his knee pain.  - Explained to patient that after his knee surgery, we will defer treatment over to orthopedics, and will no longer write for controlled pain medication at that time.  - UDS to be obtained today.    5. Smoker: Roscoe Frias  reports that he has been smoking cigarettes. He started smoking about 7 years ago. He has been smoking about 0.50 packs per day. He has never used smokeless tobacco.. I have educated him on the risk of diseases from using tobacco products such as cancer, COPD and heart disease.     I advised him to quit and he is willing.    I spent 3  minutes counseling the patient.    He will return for follow-up in 1 month.          Transcribed from ambient dictation for Barbara Waters MD by Rachael Fish.  07/01/22   09:34 CDT    Patient verbalized consent to the visit recording.  I have personally performed the services described in this document as transcribed by the  above individual, and it is both accurate and complete.  Barbara Waters MD  7/1/2022  10:29 CDT          This document has been electronically signed by Barbara Waters MD on July 1, 2022 10:29 CDT

## 2022-07-07 ENCOUNTER — HOSPITAL ENCOUNTER (OUTPATIENT)
Dept: CARDIOLOGY | Facility: HOSPITAL | Age: 52
Discharge: HOME | End: 2022-07-07
Admitting: INTERNAL MEDICINE

## 2022-07-07 VITALS
HEIGHT: 71 IN | TEMPERATURE: 96.1 F | RESPIRATION RATE: 18 BRPM | OXYGEN SATURATION: 97 % | WEIGHT: 299.2 LBS | DIASTOLIC BLOOD PRESSURE: 98 MMHG | HEART RATE: 74 BPM | BODY MASS INDEX: 41.89 KG/M2 | SYSTOLIC BLOOD PRESSURE: 153 MMHG

## 2022-07-07 DIAGNOSIS — I63.9 CRYPTOGENIC STROKE: ICD-10-CM

## 2022-07-07 PROCEDURE — 33285 INSJ SUBQ CAR RHYTHM MNTR: CPT | Performed by: INTERNAL MEDICINE

## 2022-07-07 PROCEDURE — 33285 INSJ SUBQ CAR RHYTHM MNTR: CPT

## 2022-07-07 PROCEDURE — C1764 EVENT RECORDER, CARDIAC: HCPCS

## 2022-07-07 PROCEDURE — 0 LIDOCAINE 1 % SOLUTION: Performed by: INTERNAL MEDICINE

## 2022-07-07 RX ORDER — LIDOCAINE HYDROCHLORIDE 10 MG/ML
INJECTION, SOLUTION INFILTRATION; PERINEURAL
Status: DISPENSED
Start: 2022-07-07 | End: 2022-07-07

## 2022-07-07 RX ORDER — LIDOCAINE HYDROCHLORIDE 10 MG/ML
INJECTION, SOLUTION INFILTRATION; PERINEURAL
Status: COMPLETED | OUTPATIENT
Start: 2022-07-07 | End: 2022-07-07

## 2022-07-07 RX ADMIN — LIDOCAINE HYDROCHLORIDE 10 ML: 10 INJECTION, SOLUTION INFILTRATION; PERINEURAL at 11:12

## 2022-07-10 LAB — DRUGS UR: NORMAL

## 2022-07-12 ENCOUNTER — HOSPITAL ENCOUNTER (OUTPATIENT)
Facility: HOSPITAL | Age: 52
Discharge: HOME | End: 2022-07-13
Attending: INTERNAL MEDICINE | Admitting: INTERNAL MEDICINE

## 2022-07-12 DIAGNOSIS — R94.39 ABNORMAL NUCLEAR STRESS TEST: ICD-10-CM

## 2022-07-12 LAB
ALBUMIN SERPL-MCNC: 4 G/DL (ref 3.5–5.2)
ALBUMIN/GLOB SERPL: 1.1 G/DL
ALP SERPL-CCNC: 73 U/L (ref 39–117)
ALT SERPL W P-5'-P-CCNC: 33 U/L (ref 1–41)
ANION GAP SERPL CALCULATED.3IONS-SCNC: 11 MMOL/L (ref 5–15)
AST SERPL-CCNC: 25 U/L (ref 1–40)
BASOPHILS # BLD AUTO: 0.06 10*3/MM3 (ref 0–0.2)
BASOPHILS NFR BLD AUTO: 0.8 % (ref 0–1.5)
BILIRUB SERPL-MCNC: 0.5 MG/DL (ref 0–1.2)
BUN SERPL-MCNC: 17 MG/DL (ref 6–20)
BUN/CREAT SERPL: 17 (ref 7–25)
CALCIUM SPEC-SCNC: 8.9 MG/DL (ref 8.6–10.5)
CHLORIDE SERPL-SCNC: 104 MMOL/L (ref 98–107)
CO2 SERPL-SCNC: 26 MMOL/L (ref 22–29)
CREAT SERPL-MCNC: 1 MG/DL (ref 0.76–1.27)
DEPRECATED RDW RBC AUTO: 44.1 FL (ref 37–54)
EGFRCR SERPLBLD CKD-EPI 2021: 90.6 ML/MIN/1.73
EOSINOPHIL # BLD AUTO: 0.08 10*3/MM3 (ref 0–0.4)
EOSINOPHIL NFR BLD AUTO: 1 % (ref 0.3–6.2)
ERYTHROCYTE [DISTWIDTH] IN BLOOD BY AUTOMATED COUNT: 13.4 % (ref 12.3–15.4)
GLOBULIN UR ELPH-MCNC: 3.7 GM/DL
GLUCOSE SERPL-MCNC: 186 MG/DL (ref 65–99)
HCT VFR BLD AUTO: 55.8 % (ref 37.5–51)
HGB BLD-MCNC: 18.2 G/DL (ref 13–17.7)
IMM GRANULOCYTES # BLD AUTO: 0.04 10*3/MM3 (ref 0–0.05)
IMM GRANULOCYTES NFR BLD AUTO: 0.5 % (ref 0–0.5)
INR PPP: 1.08 (ref 0.91–1.09)
LYMPHOCYTES # BLD AUTO: 2.31 10*3/MM3 (ref 0.7–3.1)
LYMPHOCYTES NFR BLD AUTO: 29.4 % (ref 19.6–45.3)
MCH RBC QN AUTO: 29.4 PG (ref 26.6–33)
MCHC RBC AUTO-ENTMCNC: 32.6 G/DL (ref 31.5–35.7)
MCV RBC AUTO: 90 FL (ref 79–97)
MONOCYTES # BLD AUTO: 0.66 10*3/MM3 (ref 0.1–0.9)
MONOCYTES NFR BLD AUTO: 8.4 % (ref 5–12)
NEUTROPHILS NFR BLD AUTO: 4.71 10*3/MM3 (ref 1.7–7)
NEUTROPHILS NFR BLD AUTO: 59.9 % (ref 42.7–76)
NRBC BLD AUTO-RTO: 0 /100 WBC (ref 0–0.2)
PLATELET # BLD AUTO: 259 10*3/MM3 (ref 140–450)
PMV BLD AUTO: 9.7 FL (ref 6–12)
POTASSIUM SERPL-SCNC: 4 MMOL/L (ref 3.5–5.2)
PROT SERPL-MCNC: 7.7 G/DL (ref 6–8.5)
PROTHROMBIN TIME: 13.6 SECONDS (ref 11.9–14.6)
RBC # BLD AUTO: 6.2 10*6/MM3 (ref 4.14–5.8)
SARS-COV-2 RNA PNL SPEC NAA+PROBE: NOT DETECTED
SODIUM SERPL-SCNC: 141 MMOL/L (ref 136–145)
WBC NRBC COR # BLD: 7.86 10*3/MM3 (ref 3.4–10.8)

## 2022-07-12 PROCEDURE — 85025 COMPLETE CBC W/AUTO DIFF WBC: CPT | Performed by: INTERNAL MEDICINE

## 2022-07-12 PROCEDURE — 87635 SARS-COV-2 COVID-19 AMP PRB: CPT | Performed by: INTERNAL MEDICINE

## 2022-07-12 PROCEDURE — C1874 STENT, COATED/COV W/DEL SYS: HCPCS | Performed by: INTERNAL MEDICINE

## 2022-07-12 PROCEDURE — C1769 GUIDE WIRE: HCPCS | Performed by: INTERNAL MEDICINE

## 2022-07-12 PROCEDURE — C1725 CATH, TRANSLUMIN NON-LASER: HCPCS | Performed by: INTERNAL MEDICINE

## 2022-07-12 PROCEDURE — 85610 PROTHROMBIN TIME: CPT | Performed by: INTERNAL MEDICINE

## 2022-07-12 PROCEDURE — C9600 PERC DRUG-EL COR STENT SING: HCPCS | Performed by: INTERNAL MEDICINE

## 2022-07-12 PROCEDURE — 99153 MOD SED SAME PHYS/QHP EA: CPT | Performed by: INTERNAL MEDICINE

## 2022-07-12 PROCEDURE — C1887 CATHETER, GUIDING: HCPCS | Performed by: INTERNAL MEDICINE

## 2022-07-12 PROCEDURE — C1894 INTRO/SHEATH, NON-LASER: HCPCS | Performed by: INTERNAL MEDICINE

## 2022-07-12 PROCEDURE — 25010000002 DIPHENHYDRAMINE PER 50 MG: Performed by: INTERNAL MEDICINE

## 2022-07-12 PROCEDURE — 25010000002 MIDAZOLAM HCL (PF) 5 MG/5ML SOLUTION: Performed by: INTERNAL MEDICINE

## 2022-07-12 PROCEDURE — 25010000002 BIVALIRUDIN TRIFLUOROACETATE 250 MG RECONSTITUTED SOLUTION: Performed by: INTERNAL MEDICINE

## 2022-07-12 PROCEDURE — 0 IOPAMIDOL PER 1 ML: Performed by: INTERNAL MEDICINE

## 2022-07-12 PROCEDURE — G0378 HOSPITAL OBSERVATION PER HR: HCPCS

## 2022-07-12 PROCEDURE — C1760 CLOSURE DEV, VASC: HCPCS | Performed by: INTERNAL MEDICINE

## 2022-07-12 PROCEDURE — C9803 HOPD COVID-19 SPEC COLLECT: HCPCS

## 2022-07-12 PROCEDURE — 93458 L HRT ARTERY/VENTRICLE ANGIO: CPT | Performed by: INTERNAL MEDICINE

## 2022-07-12 PROCEDURE — 25010000002 HEPARIN (PORCINE) 1000-0.9 UT/500ML-% SOLUTION: Performed by: INTERNAL MEDICINE

## 2022-07-12 PROCEDURE — 93571 IV DOP VEL&/PRESS C FLO 1ST: CPT | Performed by: INTERNAL MEDICINE

## 2022-07-12 PROCEDURE — 99152 MOD SED SAME PHYS/QHP 5/>YRS: CPT | Performed by: INTERNAL MEDICINE

## 2022-07-12 PROCEDURE — 92928 PRQ TCAT PLMT NTRAC ST 1 LES: CPT | Performed by: INTERNAL MEDICINE

## 2022-07-12 PROCEDURE — 25010000002 FENTANYL CITRATE (PF) 50 MCG/ML SOLUTION: Performed by: INTERNAL MEDICINE

## 2022-07-12 PROCEDURE — 80053 COMPREHEN METABOLIC PANEL: CPT | Performed by: INTERNAL MEDICINE

## 2022-07-12 PROCEDURE — 25010000002 HEPARIN (PORCINE) 2000-0.9 UNIT/L-% SOLUTION: Performed by: INTERNAL MEDICINE

## 2022-07-12 DEVICE — EVEROLIMUS-ELUTING PLATINUM CHROMIUM CORONARY STENT SYSTEM
Type: IMPLANTABLE DEVICE | Status: FUNCTIONAL
Brand: SYNERGY™

## 2022-07-12 DEVICE — XIENCE SKYPOINT™ EVEROLIMUS ELUTING CORONARY STENT SYSTEM 2.25 MM X 12 MM / RAPID-EXCHANGE
Type: IMPLANTABLE DEVICE | Status: FUNCTIONAL
Brand: XIENCE SKYPOINT™

## 2022-07-12 RX ORDER — ACETAMINOPHEN 325 MG/1
TABLET ORAL
Status: DISCONTINUED
Start: 2022-07-12 | End: 2022-07-12 | Stop reason: WASHOUT

## 2022-07-12 RX ORDER — SODIUM CHLORIDE 9 MG/ML
75 INJECTION, SOLUTION INTRAVENOUS CONTINUOUS
Status: DISCONTINUED | OUTPATIENT
Start: 2022-07-12 | End: 2022-07-13 | Stop reason: HOSPADM

## 2022-07-12 RX ORDER — HEPARIN SODIUM 200 [USP'U]/100ML
INJECTION, SOLUTION INTRAVENOUS AS NEEDED
Status: DISCONTINUED | OUTPATIENT
Start: 2022-07-12 | End: 2022-07-12 | Stop reason: HOSPADM

## 2022-07-12 RX ORDER — LIDOCAINE HYDROCHLORIDE 20 MG/ML
INJECTION, SOLUTION INFILTRATION; PERINEURAL AS NEEDED
Status: DISCONTINUED | OUTPATIENT
Start: 2022-07-12 | End: 2022-07-12 | Stop reason: HOSPADM

## 2022-07-12 RX ORDER — CLONIDINE HYDROCHLORIDE 0.1 MG/1
0.1 TABLET ORAL 2 TIMES DAILY
Status: DISCONTINUED | OUTPATIENT
Start: 2022-07-12 | End: 2022-07-13 | Stop reason: HOSPADM

## 2022-07-12 RX ORDER — FENTANYL CITRATE 50 UG/ML
INJECTION, SOLUTION INTRAMUSCULAR; INTRAVENOUS AS NEEDED
Status: DISCONTINUED | OUTPATIENT
Start: 2022-07-12 | End: 2022-07-12 | Stop reason: HOSPADM

## 2022-07-12 RX ORDER — ONDANSETRON 4 MG/1
4 TABLET, FILM COATED ORAL EVERY 6 HOURS PRN
Status: DISCONTINUED | OUTPATIENT
Start: 2022-07-12 | End: 2022-07-13 | Stop reason: HOSPADM

## 2022-07-12 RX ORDER — CALCIUM CARBONATE 200(500)MG
2 TABLET,CHEWABLE ORAL 2 TIMES DAILY PRN
Status: DISCONTINUED | OUTPATIENT
Start: 2022-07-12 | End: 2022-07-13 | Stop reason: HOSPADM

## 2022-07-12 RX ORDER — ALPRAZOLAM 0.5 MG/1
0.5 TABLET ORAL 3 TIMES DAILY PRN
Status: DISCONTINUED | OUTPATIENT
Start: 2022-07-12 | End: 2022-07-13 | Stop reason: HOSPADM

## 2022-07-12 RX ORDER — TEMAZEPAM 7.5 MG/1
7.5 CAPSULE ORAL NIGHTLY PRN
Status: DISCONTINUED | OUTPATIENT
Start: 2022-07-12 | End: 2022-07-13 | Stop reason: HOSPADM

## 2022-07-12 RX ORDER — LISINOPRIL 20 MG/1
40 TABLET ORAL 2 TIMES DAILY
Status: DISCONTINUED | OUTPATIENT
Start: 2022-07-12 | End: 2022-07-13 | Stop reason: HOSPADM

## 2022-07-12 RX ORDER — ONDANSETRON 2 MG/ML
4 INJECTION INTRAMUSCULAR; INTRAVENOUS EVERY 6 HOURS PRN
Status: DISCONTINUED | OUTPATIENT
Start: 2022-07-12 | End: 2022-07-13 | Stop reason: HOSPADM

## 2022-07-12 RX ORDER — MIDAZOLAM HYDROCHLORIDE 1 MG/ML
INJECTION, SOLUTION INTRAMUSCULAR; INTRAVENOUS AS NEEDED
Status: DISCONTINUED | OUTPATIENT
Start: 2022-07-12 | End: 2022-07-12 | Stop reason: HOSPADM

## 2022-07-12 RX ORDER — DIPHENHYDRAMINE HYDROCHLORIDE 50 MG/ML
INJECTION INTRAMUSCULAR; INTRAVENOUS AS NEEDED
Status: DISCONTINUED | OUTPATIENT
Start: 2022-07-12 | End: 2022-07-12 | Stop reason: HOSPADM

## 2022-07-12 RX ORDER — ASPIRIN 81 MG/1
81 TABLET ORAL DAILY
Status: DISCONTINUED | OUTPATIENT
Start: 2022-07-13 | End: 2022-07-13 | Stop reason: HOSPADM

## 2022-07-12 RX ORDER — ATORVASTATIN CALCIUM 40 MG/1
40 TABLET, FILM COATED ORAL DAILY
Status: DISCONTINUED | OUTPATIENT
Start: 2022-07-12 | End: 2022-07-13 | Stop reason: HOSPADM

## 2022-07-12 RX ORDER — ACETAMINOPHEN 500 MG
1000 TABLET ORAL EVERY 6 HOURS PRN
Status: DISCONTINUED | OUTPATIENT
Start: 2022-07-12 | End: 2022-07-13 | Stop reason: HOSPADM

## 2022-07-12 RX ORDER — HYDROCODONE BITARTRATE AND ACETAMINOPHEN 7.5; 325 MG/1; MG/1
1 TABLET ORAL EVERY 8 HOURS PRN
Status: DISCONTINUED | OUTPATIENT
Start: 2022-07-12 | End: 2022-07-13 | Stop reason: HOSPADM

## 2022-07-12 RX ORDER — ACETAMINOPHEN 325 MG/1
650 TABLET ORAL EVERY 4 HOURS PRN
Status: DISCONTINUED | OUTPATIENT
Start: 2022-07-12 | End: 2022-07-13 | Stop reason: HOSPADM

## 2022-07-12 RX ORDER — DIPHENHYDRAMINE HCL 25 MG
25 CAPSULE ORAL EVERY 6 HOURS PRN
Status: DISCONTINUED | OUTPATIENT
Start: 2022-07-12 | End: 2022-07-13 | Stop reason: HOSPADM

## 2022-07-12 RX ADMIN — CLONIDINE HYDROCHLORIDE 0.1 MG: 0.1 TABLET ORAL at 20:40

## 2022-07-12 RX ADMIN — LISINOPRIL 40 MG: 20 TABLET ORAL at 20:34

## 2022-07-12 RX ADMIN — ATORVASTATIN CALCIUM 40 MG: 40 TABLET, FILM COATED ORAL at 20:34

## 2022-07-12 RX ADMIN — SODIUM CHLORIDE 75 ML/HR: 9 INJECTION, SOLUTION INTRAVENOUS at 09:45

## 2022-07-12 RX ADMIN — HYDROCODONE BITARTRATE AND ACETAMINOPHEN 1 TABLET: 7.5; 325 TABLET ORAL at 18:20

## 2022-07-13 VITALS
HEART RATE: 66 BPM | OXYGEN SATURATION: 97 % | DIASTOLIC BLOOD PRESSURE: 66 MMHG | WEIGHT: 292.4 LBS | TEMPERATURE: 97.4 F | SYSTOLIC BLOOD PRESSURE: 153 MMHG | RESPIRATION RATE: 16 BRPM | BODY MASS INDEX: 40.94 KG/M2 | HEIGHT: 71 IN

## 2022-07-13 DIAGNOSIS — Z95.5 S/P DRUG ELUTING CORONARY STENT PLACEMENT: Primary | ICD-10-CM

## 2022-07-13 LAB
ANION GAP SERPL CALCULATED.3IONS-SCNC: 9 MMOL/L (ref 5–15)
BUN SERPL-MCNC: 15 MG/DL (ref 6–20)
BUN/CREAT SERPL: 14 (ref 7–25)
CALCIUM SPEC-SCNC: 9.2 MG/DL (ref 8.6–10.5)
CHLORIDE SERPL-SCNC: 103 MMOL/L (ref 98–107)
CHOLEST SERPL-MCNC: 179 MG/DL (ref 0–200)
CO2 SERPL-SCNC: 25 MMOL/L (ref 22–29)
CREAT SERPL-MCNC: 1.07 MG/DL (ref 0.76–1.27)
DEPRECATED RDW RBC AUTO: 43.9 FL (ref 37–54)
EGFRCR SERPLBLD CKD-EPI 2021: 83.5 ML/MIN/1.73
ERYTHROCYTE [DISTWIDTH] IN BLOOD BY AUTOMATED COUNT: 13.5 % (ref 12.3–15.4)
GLUCOSE SERPL-MCNC: 186 MG/DL (ref 65–99)
HBA1C MFR BLD: 8.3 % (ref 4.8–5.6)
HCT VFR BLD AUTO: 52.2 % (ref 37.5–51)
HDLC SERPL-MCNC: 33 MG/DL (ref 40–60)
HGB BLD-MCNC: 17.4 G/DL (ref 13–17.7)
LDLC SERPL CALC-MCNC: 128 MG/DL (ref 0–100)
LDLC/HDLC SERPL: 3.85 {RATIO}
MCH RBC QN AUTO: 29.7 PG (ref 26.6–33)
MCHC RBC AUTO-ENTMCNC: 33.3 G/DL (ref 31.5–35.7)
MCV RBC AUTO: 89.2 FL (ref 79–97)
PLATELET # BLD AUTO: 225 10*3/MM3 (ref 140–450)
PMV BLD AUTO: 9.9 FL (ref 6–12)
POTASSIUM SERPL-SCNC: 3.8 MMOL/L (ref 3.5–5.2)
RBC # BLD AUTO: 5.85 10*6/MM3 (ref 4.14–5.8)
SODIUM SERPL-SCNC: 137 MMOL/L (ref 136–145)
TRIGL SERPL-MCNC: 95 MG/DL (ref 0–150)
VLDLC SERPL-MCNC: 18 MG/DL (ref 5–40)
WBC NRBC COR # BLD: 6.14 10*3/MM3 (ref 3.4–10.8)

## 2022-07-13 PROCEDURE — 80048 BASIC METABOLIC PNL TOTAL CA: CPT | Performed by: INTERNAL MEDICINE

## 2022-07-13 PROCEDURE — 83036 HEMOGLOBIN GLYCOSYLATED A1C: CPT | Performed by: INTERNAL MEDICINE

## 2022-07-13 PROCEDURE — 85027 COMPLETE CBC AUTOMATED: CPT | Performed by: INTERNAL MEDICINE

## 2022-07-13 PROCEDURE — 80061 LIPID PANEL: CPT | Performed by: INTERNAL MEDICINE

## 2022-07-13 PROCEDURE — 93010 ELECTROCARDIOGRAM REPORT: CPT | Performed by: INTERNAL MEDICINE

## 2022-07-13 PROCEDURE — 93005 ELECTROCARDIOGRAM TRACING: CPT | Performed by: INTERNAL MEDICINE

## 2022-07-13 RX ORDER — ROSUVASTATIN CALCIUM 20 MG/1
20 TABLET, COATED ORAL DAILY
Qty: 30 TABLET | Refills: 11 | Status: SHIPPED | OUTPATIENT
Start: 2022-07-13

## 2022-07-13 RX ORDER — ASPIRIN 81 MG/1
81 TABLET ORAL DAILY
Qty: 100 TABLET | Refills: 3 | Status: SHIPPED | OUTPATIENT
Start: 2022-07-13

## 2022-07-13 RX ORDER — NITROGLYCERIN 0.4 MG/1
TABLET SUBLINGUAL
Qty: 25 TABLET | Refills: 11 | Status: SHIPPED | OUTPATIENT
Start: 2022-07-13

## 2022-07-13 RX ADMIN — LISINOPRIL 40 MG: 20 TABLET ORAL at 08:59

## 2022-07-13 RX ADMIN — CLONIDINE HYDROCHLORIDE 0.1 MG: 0.1 TABLET ORAL at 08:59

## 2022-07-13 RX ADMIN — TICAGRELOR 90 MG: 90 TABLET ORAL at 01:57

## 2022-07-13 RX ADMIN — ASPIRIN 81 MG: 81 TABLET, COATED ORAL at 08:59

## 2022-07-13 RX ADMIN — ATORVASTATIN CALCIUM 40 MG: 40 TABLET, FILM COATED ORAL at 08:58

## 2022-07-13 RX ADMIN — ALPRAZOLAM 0.5 MG: 0.5 TABLET ORAL at 04:02

## 2022-07-13 RX ADMIN — HYDROCODONE BITARTRATE AND ACETAMINOPHEN 1 TABLET: 7.5; 325 TABLET ORAL at 04:00

## 2022-07-13 NOTE — PLAN OF CARE
Goal Outcome Evaluation:  Plan of Care Reviewed With: patient        Progress: improving  Outcome Evaluation: Patient received alert and orientated with no complaints of chest pain or shortness of breath.  Patient right groin with small bruise noted anterior to gauze dressing that has not changed in size throughout the shift and remains soft, non-tender.  Patient educated on Brilinta and its side effects; per patient he will discuss with Cardiologist on taking medication at home at discharge and concerns of side effects.  Patient remains on continuous cardiac monitoring; pain relieved with ordered medication with good outcomes.  Will continue to assess and treat patient per plan of care.

## 2022-07-14 LAB
QT INTERVAL: 392 MS
QTC INTERVAL: 425 MS

## 2022-07-26 ENCOUNTER — CLINICAL SUPPORT NO REQUIREMENTS (OUTPATIENT)
Dept: CARDIOLOGY | Facility: CLINIC | Age: 52
End: 2022-07-26

## 2022-07-26 ENCOUNTER — OFFICE VISIT (OUTPATIENT)
Dept: CARDIOLOGY | Facility: CLINIC | Age: 52
End: 2022-07-26

## 2022-07-26 VITALS
WEIGHT: 290 LBS | SYSTOLIC BLOOD PRESSURE: 130 MMHG | DIASTOLIC BLOOD PRESSURE: 80 MMHG | BODY MASS INDEX: 40.47 KG/M2 | HEART RATE: 103 BPM | OXYGEN SATURATION: 98 %

## 2022-07-26 DIAGNOSIS — E78.2 MIXED HYPERLIPIDEMIA: ICD-10-CM

## 2022-07-26 DIAGNOSIS — Z86.73 HISTORY OF CVA IN ADULTHOOD: ICD-10-CM

## 2022-07-26 DIAGNOSIS — G47.33 OSA (OBSTRUCTIVE SLEEP APNEA): ICD-10-CM

## 2022-07-26 DIAGNOSIS — I10 ESSENTIAL HYPERTENSION: ICD-10-CM

## 2022-07-26 DIAGNOSIS — I63.9 CRYPTOGENIC STROKE: Primary | ICD-10-CM

## 2022-07-26 DIAGNOSIS — G47.10 HYPERSOMNIA: ICD-10-CM

## 2022-07-26 DIAGNOSIS — Z95.818 STATUS POST PLACEMENT OF IMPLANTABLE LOOP RECORDER: Primary | ICD-10-CM

## 2022-07-26 DIAGNOSIS — I63.9 CRYPTOGENIC STROKE: ICD-10-CM

## 2022-07-26 PROCEDURE — 99214 OFFICE O/P EST MOD 30 MIN: CPT | Performed by: INTERNAL MEDICINE

## 2022-07-26 NOTE — PROGRESS NOTES
[facility] Report- IN OFFICE CHECK      July 26, 2022    Primary Cardiologist:  Radha  Reason for implant:  cryptogenic stroke   Date of implant:  7/7/2022  Battery:  Good  Incision:  Healed.  Well approximated without erythema, edema, warmth, open areas or drainage.  One exposed suture removed using suture removal kit.  Patient tolerated well.  Events:  Sinus bradycardia x 4, longest duration 15 seconds, rates 36-43 bpm, three of four episodes during daytime hours  Changes:  No changes made    Follow up:  Monthly and PRN via carelink.  Monitor is connected and functioning properly.      Education:  Patient educated that battery lasts 3-4 years but patient can choose to leave device implanted for life.  Patient given instructions on how to send a forced transmission and instructed to do so only when instructed by staff.  [employer] support phone number and RN's direct line provided to patient for future questions.

## 2022-07-26 NOTE — PROGRESS NOTES
Roscoe Frias  [id number]  1970  52 y.o.  male    Referring Provider: Barbara Waters MD    Reason for  Visit:      short term office follow up after recent encounter   Here for routine follow up   Initial visit for  preoperative cardiovascular clearance under general anesthesia for right total knee replacement    Prior cerebrovascular accident   stented coronary artery       Subjective    Mild chronic exertional shortness of breath on exertion relieved with rest  No significant cough or wheezing    No palpitations  No associated chest pain  No significant pedal edema    No fever or chills  No significant expectoration    No hemoptysis  No presyncope or syncope    Tolerating current medications well with no untoward side effects   Compliant with prescribed medication regimen. Tries to adhere to cardiac diet.     No bleeding, excessive bruising, gait instability or fall risks    BP well controlled at home.              History of present illness:  Roscoe Frias is a 52 y.o. yo male with essential hypertension    who presents today for   Chief Complaint   Patient presents with   • Follow-up     3 week follow up    .    History  Past Medical History:   Diagnosis Date   • Hypertension    • Stroke (HCC)    ,   Past Surgical History:   Procedure Laterality Date   • BACK SURGERY     • CARDIAC CATHETERIZATION N/A 7/12/2022    Procedure: Cardiac Catheterization/Vascular Study DAYSI OK  He will wait for knee replacement for 1 year;  Surgeon: Kevin Garcia MD;  Location: [facility] CATH INVASIVE LOCATION;  Service: Cardiology;  Laterality: N/A;   • NECK SURGERY     ,   Family History   Problem Relation Age of Onset   • No Known Problems Mother    • No Known Problems Father    • No Known Problems Sister    • No Known Problems Brother    • No Known Problems Son    • No Known Problems Daughter    • No Known Problems Maternal Grandmother    • No Known Problems Maternal Grandfather    • No Known Problems Paternal  Grandmother    • No Known Problems Paternal Grandfather    • No Known Problems Cousin    • Rheum arthritis Neg Hx    • Osteoarthritis Neg Hx    • Asthma Neg Hx    • Diabetes Neg Hx    • Heart failure Neg Hx    • Hyperlipidemia Neg Hx    • Hypertension Neg Hx    • Migraines Neg Hx    • Rashes / Skin problems Neg Hx    • Seizures Neg Hx    • Stroke Neg Hx    • Thyroid disease Neg Hx    ,   Social History     Tobacco Use   • Smoking status: Current Every Day Smoker     Packs/day: 0.50     Types: Cigarettes     Start date: 2015   • Smokeless tobacco: Never Used   Vaping Use   • Vaping Use: Never used   Substance Use Topics   • Alcohol use: No   • Drug use: No   ,     Medications  Current Outpatient Medications   Medication Sig Dispense Refill   • aspirin 81 MG EC tablet Take 1 tablet by mouth Daily. 100 tablet 3   • cloNIDine (CATAPRES) 0.1 MG tablet Take 1 tablet by mouth twice daily 60 tablet 3   • HYDROcodone-acetaminophen (NORCO) 7.5-325 MG per tablet Take 1 tablet by mouth Every 8 (Eight) Hours As Needed for Moderate Pain . 90 tablet 0   • lisinopril (PRINIVIL,ZESTRIL) 40 MG tablet Take 1 tablet by mouth twice daily 180 tablet 0   • nitroglycerin (NITROSTAT) 0.4 MG SL tablet 1 under the tongue as needed for angina, may repeat q5mins for up three doses 25 tablet 11   • rosuvastatin (CRESTOR) 20 MG tablet Take 1 tablet by mouth Daily. 30 tablet 11   • ticagrelor (BRILINTA) 90 MG tablet tablet Take 1 tablet by mouth Every 12 (Twelve) Hours. 60 tablet 11     No current facility-administered medications for this visit.       Allergies:  Hctz [hydrochlorothiazide], Magnesium-containing compounds, Toradol [ketorolac tromethamine], and Tramadol    Review of Systems  Review of Systems   Constitutional: Negative.   HENT: Negative.    Eyes: Negative.    Cardiovascular: Positive for dyspnea on exertion. Negative for chest pain, claudication, cyanosis, irregular heartbeat, leg swelling, near-syncope, orthopnea, palpitations,  paroxysmal nocturnal dyspnea and syncope.   Respiratory: Negative.    Endocrine: Negative.    Hematologic/Lymphatic: Negative.    Skin: Negative.    Musculoskeletal: Positive for arthritis and joint pain.   Gastrointestinal: Negative for anorexia.   Genitourinary: Negative.    Neurological: Negative.    Psychiatric/Behavioral: Negative.        Objective     Physical Exam:  /80   Pulse 103   Wt 132 kg (290 lb)   SpO2 98%   BMI 40.47 kg/m²     Physical Exam  Constitutional:       Appearance: He is well-developed.   HENT:      Head: Normocephalic.   Neck:      Vascular: Normal carotid pulses. No carotid bruit or JVD.      Trachea: No tracheal tenderness or tracheal deviation.   Cardiovascular:      Rate and Rhythm: Regular rhythm.      Pulses: Normal pulses.      Heart sounds: Murmur heard.    Systolic murmur is present with a grade of 2/6.  Pulmonary:      Effort: Pulmonary effort is normal.      Breath sounds: No stridor.   Abdominal:      Palpations: Abdomen is soft.   Musculoskeletal:      Cervical back: No edema.   Skin:     General: Skin is warm.   Neurological:      Mental Status: He is alert.      Cranial Nerves: No cranial nerve deficit.      Sensory: No sensory deficit.   Psychiatric:         Speech: Speech normal.         Behavior: Behavior normal.     Arteriotomy site healthy,  No bleeding, swelling or excessive bruising. Preserved distal perfusion.   Loop recorder site healed well     Results Review:        Conclusion of cardiac catheterization    7/12/2022     Coronary angiography:     Normal left main coronary artery  Proximal left anterior descending coronary artery has 40 to 50% stenosis with normal RFR of 0.94  Mid left anterior descending coronary artery has a 60% stenosis with abnormal RFR of 0.86  This was intervened upon with PTCA and placement of 2.25 x 12 mm drug-eluting stents x2  No significant disease of left circumflex coronary artery  Moderate atherosclerotic changes of right  coronary artery without any obstructive disease      Left heart cath: LVEDP 8   mm Hg with no gradient across aortic valve on pullback.      LV Gram: Not performed, by noninvasive test normal left ventricular ejection fraction  Interventions: JL 4 6 [place] guide was used  Advanced wire into the distal left anterior descending coronary artery using RFR wire  Use this 5 for coronary intervention  Abnormal RFR of mid left anterior descending coronary arteries of 0.86  Proximal left anterior descending coronary artery normal RFR of 0.94  Advance implanted a 2.25 x 12 mm Synergy stent  Noted to have additional stenosis with the proximal plaque shift treated with PTCA and then implantation of another 2.25 x 12 mm Synergy drug-eluting stent  After implantation of stent performed PTCA with stent balloon in overlapping fashion  Initial stenosis 60% postprocedure 0% with OWEN-3 flow before and after  No immediate complications  Patient left the Cath Lab in stable and improved condition  All hardware removed        ____________________________________________________________________________________________________________________________________________     Plan after cardiac catheterization     Dual antiplatelet therapy minimum of 1 year preferably longer  Aspirin 81 mg rest of his life  Statin therapy  Beta-blocker and ACE inhibitor as tolerated  Cardiac rehabilitation  Usual post procedure precautions after arteriotomy including monitoring for signs both local and systemic side effects.  On ultimate discharge will receive printed instructions  Intensive risk factor modifications for both primary and secondary prevention if applicable  Hydration   Observation              Roscoe Frias  HOLTER MONITOR >7 DAYS UP TO 15 DAYS HOOK-UP & INTERP (CLINIC)  Order# [id number]  Reading physician: Kevin Garcia MD Ordering physician: Kevin Garcia MD Study date: 2/22/22       Patient Information    Patient Name   Roscoe Frias MRN    [id number] Legal Sex   Male  (Age)   1970 (52 y.o.)     Interpretation Summary       · Average HR: 78. Min HR: 43. Max HR: 120.     · Entire report was reviewed.  Monitoring in days as noted below  · The predominant rhythm noted during the testing period was sinus with rates as above while in sinus rhythm.     · Rare supraventricular ectopics with an APC burden of: < 1%    · Rare premature ventricular contractions with a PVC burden of: < 1%     · No correlated arrhythmia  · No significant pauses                  Conclusion:      Baseline rhythm is sinus.  Slowest heart rate while in sinus rhythm of 43 bpm at 1:42 AM consists of marked sinus bradycardia presumably during sleep  No significant ectopy   Single 4 beat run of supraventricular tachycardia at a maximum rate of 179 bpm at 1:12 AM  Transient Mobitz type I AV block noted with the lowest rate of 22 bpm at 2:12 AM presumably during sleep lasting for less than 5 seconds           Roscoe Frias  Regadenoson Stress Test With Myocardial Perfusion SPECT (Multi Study)  Order# [id number]  Reading physician: Kevin Garcia MD Ordering physician: Kevin Garcia MD Study date: 22       Patient Information    Patient Name   Roscoe Frias MRN   [id number] Legal Sex   Male  (Age)   1970 (52 y.o.)         Interpretation Summary    · Diaphragmatic attenuation artifact is present.  · Raw images reviewed with the following abnormalities noted: Slight vertical motion artifact noted.  · Left ventricular ejection fraction is hyperdynamic (Calculated EF > 70%). .  · Small inferior lateral area of ischemia  · Report delayed as nuclear computer processing software earlier was not working         Cardiac CT angiography 2021     Impression:      Total calcium score (using PHOENIX calcium score calculator): Left main 3.9, .1, LCx 6.4, .6 and total 546  This is markedly elevated and at the 98th percentile for matched population        Coronary  angiography:     Normal left main coronary artery  Proximal left anterior descending coronary artery is normal  Mid left anterior descending coronary artery has 40 to 50% stenosis  Prominent misregistration artifact in the mid segment of the mid left anterior descending coronary artery  Focal calcification with 40 to 50% stenosis of the proximal right coronary artery  No significant disease of the proximal to mid right coronary artery.  No obstructive disease of the left circumflex coronary artery or obtuse marginal branches noted.  Distal right coronary arteries not well visualized but the right posterior descending coronary artery appears to have a mid segment 60 to 70% stenosis  Misregistration artifacts as well as morbid obesity decrease overall accuracy of the test     ___________________________________________________________________________     Recommendations:        Based on above findings with very high calcium score recommend coronary angiography along with guideline directed medical therapy for coronary artery disease  Prior CT of the chest there is mention of pulmonary nodules for which interim CT chest was advised by radiology but does not appear to have been performed  Would recommend CT chest without contrast     Results for orders placed during the hospital encounter of 05/05/22    Adult Transthoracic Echo Complete w/ Color, Spectral and Contrast if necessary per protocol    Interpretation Summary  · Left ventricular wall thickness is consistent with mild concentric hypertrophy.  · Left ventricular ejection fraction appears to be 56 - 60%. Left ventricular systolic function is normal.  · Left ventricular diastolic function was normal.  · Abnormal global longitudinal LV strain (GLS) = -7.0%.  · Estimated right ventricular systolic pressure from tricuspid regurgitation is normal (<35 mmHg).         ____________________________________________________________________________________________________________________________________________  Health maintenance and recommendations    Low salt/ HTN/ Heart healthy carbohydrate restricted cardiac diet   The patient is advised to reduce or avoid caffeine or other cardiac stimulants.   Minimize or avoid  NSAID-type medications      Monitor for any signs of bleeding including red or dark stools. Fall precautions.   Advised staying uptodate with immunizations per established standard guidelines.    Offered to give patient  a copy of my notes     Questions were encouraged, asked and answered to the patient's  understanding and satisfaction. Questions if any regarding current medications and side effects, need for refills and importance of compliance to medications stressed.    Reviewed available prior notes, consults, prior visits, laboratory findings, radiology and cardiology relevant reports. Updated chart as applicable. I have reviewed the patient's medical history in detail and updated the computerized patient record as relevant.      Updated patient regarding any new or relevant abnormalities on review of records or any new findings on physical exam. Mentioned to patient about purpose of visit and desirable health short and long term goals and objectives.    Primary to monitor CBC CMP Lipid panel and TSH as applicable    ___________________________________________________________________________________________________________________________________________   Procedures    Assessment & Plan   Diagnoses and all orders for this visit:    1. Cryptogenic stroke (HCC) 2021 (Primary)    2. Mixed hyperlipidemia    3. MANSOOR (obstructive sleep apnea)    4. Hypersomnia    5. History of CVA in adulthood    6. Essential hypertension          Plan    Monitor for any signs of bleeding including red or dark stools as well as easy bruisabilty. Fall precautions.   Monitor cardiac  rhythm device function regularly per established schedule or PRN      Patient expressed understanding  Encouraged and answered all questions   Discussed with the patient and all questioned fully answered. He will call me if any problems arise.   Discussed results of prior testing with patient : cath     Continue dual antiplatelet therapy  X 1 year from time of latest percutaneous coronary intervention and placement of drug-eluting stent  Take aspirin 81 mg daily for life.     Monitor for any signs of bleeding including red or dark stools as well as easy bruisabilty. Fall precautions.   Check BP and heart rates twice daily initially till blood pressures and heart rates under good control and then at least 3x / week,   If blood pressures continue to be well-controlled then can check week a month  at home and bring a recording for review next visit  If BP >130/85 or < 100/60 persistently over 3 reading 30 mins apart or if heart rates persistently above 100 bpm or less than 55 bpm call sooner for evaluation and advise     S/L NTG PRN for chest pain, call me or go to ER if has to use S/L nitroglycerin     Keep LDL below 70 mg/dl. Monitor liver and renal functions.   Monitor CBC, CMP, TSH (as indicated) and Lipid Panel by primary     Due for sleep study today for suspected obstructive sleep apnea     Follow up with Tiara TROTTER , Sang TROTTER, Ana Rowell PA-C  or myself          Return in about 3 months (around 10/26/2022).

## 2022-07-29 DIAGNOSIS — I10 ESSENTIAL HYPERTENSION: ICD-10-CM

## 2022-08-01 DIAGNOSIS — G89.29 CHRONIC PAIN OF RIGHT KNEE: ICD-10-CM

## 2022-08-01 DIAGNOSIS — M25.561 CHRONIC PAIN OF RIGHT KNEE: ICD-10-CM

## 2022-08-01 RX ORDER — HYDROCODONE BITARTRATE AND ACETAMINOPHEN 7.5; 325 MG/1; MG/1
1 TABLET ORAL EVERY 8 HOURS PRN
Qty: 90 TABLET | Refills: 0 | Status: SHIPPED | OUTPATIENT
Start: 2022-08-01 | End: 2022-09-01 | Stop reason: SDUPTHER

## 2022-08-01 RX ORDER — LISINOPRIL 40 MG/1
TABLET ORAL
Qty: 180 TABLET | Refills: 0 | Status: SHIPPED | OUTPATIENT
Start: 2022-08-01 | End: 2022-10-24

## 2022-08-01 NOTE — TELEPHONE ENCOUNTER
Rx Refill Note  Requested Prescriptions     Pending Prescriptions Disp Refills   • HYDROcodone-acetaminophen (NORCO) 7.5-325 MG per tablet 90 tablet 0     Sig: Take 1 tablet by mouth Every 8 (Eight) Hours As Needed for Moderate Pain .      Last office visit with prescribing clinician: 7/1/2022      Next office visit with prescribing clinician: 8/5/2022            Kylie Ardon MA  08/01/22, 14:49 CDT

## 2022-08-01 NOTE — TELEPHONE ENCOUNTER
Caller: JENIFER PAGAN    Relationship: Emergency Contact    Best call back number: [phone]    Requested Prescriptions:   Requested Prescriptions     Pending Prescriptions Disp Refills   • HYDROcodone-acetaminophen (NORCO) 7.5-325 MG per tablet 90 tablet 0     Sig: Take 1 tablet by mouth Every 8 (Eight) Hours As Needed for Moderate Pain .        Pharmacy where request should be sent: [facility] [address] [place] [address] [phone] [facility] [phone] FX     Additional details provided by patient: WILL BE OUT OF MEDICATION ON 8/3/22. HAS APPOINTMENT FOR MED REVIEW ON 8/5/22.     Does the patient have less than a 3 day supply:  [x] Yes  [] No    Melvin Wiggins Rep   08/01/22 14:43 CDT

## 2022-08-01 NOTE — TELEPHONE ENCOUNTER
Rx Refill Note  Requested Prescriptions     Pending Prescriptions Disp Refills   • lisinopril (PRINIVIL,ZESTRIL) 40 MG tablet [Pharmacy Med Name: Lisinopril 40 MG Oral Tablet] 180 tablet 0     Sig: Take 1 tablet by mouth twice daily      Last office visit with prescribing clinician: 7/1/2022      Next office visit with prescribing clinician: 8/5/2022            Kylie Ardon MA  08/01/22, 08:47 CDT

## 2022-08-05 ENCOUNTER — OFFICE VISIT (OUTPATIENT)
Dept: FAMILY MEDICINE CLINIC | Facility: CLINIC | Age: 52
End: 2022-08-05

## 2022-08-05 VITALS
RESPIRATION RATE: 20 BRPM | SYSTOLIC BLOOD PRESSURE: 126 MMHG | DIASTOLIC BLOOD PRESSURE: 90 MMHG | HEART RATE: 85 BPM | WEIGHT: 292 LBS | TEMPERATURE: 96.2 F | BODY MASS INDEX: 40.88 KG/M2 | OXYGEN SATURATION: 98 % | HEIGHT: 71 IN

## 2022-08-05 DIAGNOSIS — E66.01 CLASS 3 SEVERE OBESITY DUE TO EXCESS CALORIES WITH SERIOUS COMORBIDITY AND BODY MASS INDEX (BMI) OF 40.0 TO 44.9 IN ADULT: ICD-10-CM

## 2022-08-05 DIAGNOSIS — G47.33 OSA (OBSTRUCTIVE SLEEP APNEA): ICD-10-CM

## 2022-08-05 DIAGNOSIS — G89.29 CHRONIC PAIN OF RIGHT KNEE: Primary | ICD-10-CM

## 2022-08-05 DIAGNOSIS — M25.561 CHRONIC PAIN OF RIGHT KNEE: Primary | ICD-10-CM

## 2022-08-05 DIAGNOSIS — I25.10 CORONARY ARTERY DISEASE INVOLVING NATIVE CORONARY ARTERY OF NATIVE HEART WITHOUT ANGINA PECTORIS: ICD-10-CM

## 2022-08-05 DIAGNOSIS — I10 ESSENTIAL HYPERTENSION: ICD-10-CM

## 2022-08-05 DIAGNOSIS — E78.2 MIXED HYPERLIPIDEMIA: ICD-10-CM

## 2022-08-05 PROCEDURE — 99214 OFFICE O/P EST MOD 30 MIN: CPT | Performed by: FAMILY MEDICINE

## 2022-09-01 ENCOUNTER — OFFICE VISIT (OUTPATIENT)
Dept: FAMILY MEDICINE CLINIC | Facility: CLINIC | Age: 52
End: 2022-09-01

## 2022-09-01 VITALS
HEART RATE: 80 BPM | WEIGHT: 287 LBS | OXYGEN SATURATION: 97 % | TEMPERATURE: 96.9 F | SYSTOLIC BLOOD PRESSURE: 116 MMHG | BODY MASS INDEX: 40.18 KG/M2 | DIASTOLIC BLOOD PRESSURE: 70 MMHG | HEIGHT: 71 IN | RESPIRATION RATE: 20 BRPM

## 2022-09-01 DIAGNOSIS — E78.2 MIXED HYPERLIPIDEMIA: ICD-10-CM

## 2022-09-01 DIAGNOSIS — G89.29 CHRONIC PAIN OF RIGHT KNEE: Primary | ICD-10-CM

## 2022-09-01 DIAGNOSIS — I25.10 CORONARY ARTERY DISEASE INVOLVING NATIVE CORONARY ARTERY OF NATIVE HEART WITHOUT ANGINA PECTORIS: ICD-10-CM

## 2022-09-01 DIAGNOSIS — I10 ESSENTIAL HYPERTENSION: ICD-10-CM

## 2022-09-01 DIAGNOSIS — E66.01 CLASS 3 SEVERE OBESITY DUE TO EXCESS CALORIES WITH SERIOUS COMORBIDITY AND BODY MASS INDEX (BMI) OF 40.0 TO 44.9 IN ADULT: ICD-10-CM

## 2022-09-01 DIAGNOSIS — M25.561 CHRONIC PAIN OF RIGHT KNEE: Primary | ICD-10-CM

## 2022-09-01 PROCEDURE — 99214 OFFICE O/P EST MOD 30 MIN: CPT | Performed by: FAMILY MEDICINE

## 2022-09-01 RX ORDER — HYDROCODONE BITARTRATE AND ACETAMINOPHEN 7.5; 325 MG/1; MG/1
1 TABLET ORAL EVERY 8 HOURS PRN
Qty: 90 TABLET | Refills: 0 | Status: SHIPPED | OUTPATIENT
Start: 2022-09-01 | End: 2022-09-29 | Stop reason: SDUPTHER

## 2022-09-01 NOTE — PROGRESS NOTES
"Subjective cc: chronic knee pain   Roscoe Frias is a 52 y.o. male.     History of Present Illness   Roscoe Frias presents today with a chief complaint of chronic right knee pain.     Luis Angel is a 52-year-old white male with hypertension, hyperlipidemia, CAD status post two stent placements to the LAD, obesity, and chronic right knee pain. Patient does not have any significant complaints today.     His right knee continues to give him pain. Patient is unable to have elective right knee surgery until he is 1 year postop from his stent placements. Our agreement was to write pain medication for patient during this 12-month period until he can be cleared to move forward with knee surgery. Patient does continue to take the pain medication and does report that it helps. He denies any significant constipation. He does report having diarrhea for the past few days but attributes this to bad food.     Patient's blood pressure is well controlled. He has been compliant with taking his medication including his aspirin, ACE, statin, and Brilinta.     Patient has continued to lose weight and is down approximately 5 pounds since his last visit.     He does continue to smoke. Reports 1 to 2 cigarettes per day as he is trying to quit smoking.    The following portions of the patient's history were reviewed and updated as appropriate: allergies, current medications, past family history, past medical history, past social history, past surgical history and problem list.        Review of Systems    Objective   Blood pressure 116/70, pulse 80, temperature 96.9 °F (36.1 °C), temperature source Infrared, resp. rate 20, height 180.3 cm (71\"), weight 130 kg (287 lb), SpO2 97 %.  Physical Exam  Vitals and nursing note reviewed.   Constitutional:       General: He is not in acute distress.     Appearance: He is well-developed. He is obese. He is not diaphoretic.   HENT:      Head: Normocephalic and atraumatic.      Right Ear: External ear " normal.      Left Ear: External ear normal.      Nose: Nose normal.   Eyes:      General:         Right eye: No discharge.         Left eye: No discharge.      Conjunctiva/sclera: Conjunctivae normal.   Neck:      Thyroid: No thyromegaly.      Trachea: No tracheal deviation.   Cardiovascular:      Rate and Rhythm: Normal rate and regular rhythm.      Pulses: Normal pulses.      Heart sounds: Normal heart sounds.   Pulmonary:      Effort: Pulmonary effort is normal. No respiratory distress.      Breath sounds: Normal breath sounds. No stridor. No wheezing.   Chest:      Chest wall: No tenderness.   Musculoskeletal:      Cervical back: Normal range of motion.      Right knee: Bony tenderness and crepitus present. Decreased range of motion. Tenderness present.   Lymphadenopathy:      Cervical: No cervical adenopathy.   Skin:     General: Skin is warm and dry.   Neurological:      Mental Status: He is alert and oriented to person, place, and time.      Motor: No abnormal muscle tone.      Coordination: Coordination normal.   Psychiatric:         Behavior: Behavior normal.         Thought Content: Thought content normal.         Judgment: Judgment normal.         Assessment & Plan   Problems Addressed this Visit        Cardiac and Vasculature    Essential hypertension    Mixed hyperlipidemia    Coronary artery disease involving native coronary artery of native heart without angina pectoris       Endocrine and Metabolic    Class 3 severe obesity due to excess calories with serious comorbidity and body mass index (BMI) of 40.0 to 44.9 in adult ([facility])      Other Visit Diagnoses     Chronic pain of right knee    -  Primary    Relevant Medications    HYDROcodone-acetaminophen (NORCO) 7.5-325 MG per tablet      Diagnoses       Codes Comments    Chronic pain of right knee    -  Primary ICD-10-CM: M25.561, G89.29  ICD-9-CM: 719.46, 338.29     Essential hypertension     ICD-10-CM: I10  ICD-9-CM: 401.9     Mixed hyperlipidemia      ICD-10-CM: E78.2  ICD-9-CM: 272.2     Coronary artery disease involving native coronary artery of native heart without angina pectoris     ICD-10-CM: I25.10  ICD-9-CM: 414.01     Class 3 severe obesity due to excess calories with serious comorbidity and body mass index (BMI) of 40.0 to 44.9 in adult ([facility])     ICD-10-CM: E66.01, Z68.41  ICD-9-CM: 278.01, V85.41         1. Hypertension: Chronic, controlled.  - Continue on current medication.    2. Hyperlipidemia: Chronic, stable.  - Continue on statin.    3. CAD: Chronic, stable.  - Reviewed patient's cath report.  - Discussed where his stents were placed.  - Advised on the importance of medication compliance.  - Continue on current medications.  - Keep follow-up with cardiology as scheduled.    4. Chronic knee pain: Chronic, stable.  - continue on current medication. Refill given.  - Controlled contract in the chart. UDS up to date.  - CROW reviewed and appropriate.    5. Obesity: Class 3 Severe Obesity (BMI >=40). Obesity-related health conditions include the following: hypertension, coronary heart disease and dyslipidemias. Obesity is improving with lifestyle modifications. BMI is is above average; BMI management plan is completed. We discussed portion control and increasing exercise.     Transcribed from ambient dictation for Barbara Waters MD by Jeri Tolentino.  09/01/22   10:13 CDT    Patient verbalized consent to the visit recording.  I have personally performed the services described in this document as transcribed by the above individual, and it is both accurate and complete.  Barbara Waters MD  9/1/2022  13:30 CDT             This document has been electronically signed by Barbara Waters MD on September 1, 2022 13:30 CDT

## 2022-09-29 DIAGNOSIS — M25.561 CHRONIC PAIN OF RIGHT KNEE: ICD-10-CM

## 2022-09-29 DIAGNOSIS — G89.29 CHRONIC PAIN OF RIGHT KNEE: ICD-10-CM

## 2022-09-29 NOTE — TELEPHONE ENCOUNTER
Caller: JENIFER PAGAN    Relationship: Emergency Contact    Best call back number: [phone]    Requested Prescriptions:   Requested Prescriptions     Pending Prescriptions Disp Refills   • HYDROcodone-acetaminophen (NORCO) 7.5-325 MG per tablet 90 tablet 0     Sig: Take 1 tablet by mouth Every 8 (Eight) Hours As Needed for Moderate Pain.        Pharmacy where request should be sent: [facility] [address] [place] [address] [phone] [facility] [phone] [facility]       Does the patient have less than a 3 day supply:  [x] Yes  [] No    Melvin Mondragon Rep   09/29/22 12:46 CDT

## 2022-09-29 NOTE — TELEPHONE ENCOUNTER
Patient's girlfriend Amy Melendez is returning a call to [place].  Her number is [phone].

## 2022-09-30 RX ORDER — HYDROCODONE BITARTRATE AND ACETAMINOPHEN 7.5; 325 MG/1; MG/1
1 TABLET ORAL EVERY 8 HOURS PRN
Qty: 90 TABLET | Refills: 0 | Status: SHIPPED | OUTPATIENT
Start: 2022-09-30 | End: 2022-10-31 | Stop reason: SDUPTHER

## 2022-09-30 NOTE — TELEPHONE ENCOUNTER
Rx Refill Note  Requested Prescriptions     Pending Prescriptions Disp Refills   • HYDROcodone-acetaminophen (NORCO) 7.5-325 MG per tablet 90 tablet 0     Sig: Take 1 tablet by mouth Every 8 (Eight) Hours As Needed for Moderate Pain.      Last office visit with prescribing clinician: 9/1/2022      Next office visit with prescribing clinician: 10/4/2022    Last refill: 9/1/2022      Hillary Hernandez MA  09/30/22, 15:19 CDT

## 2022-10-04 ENCOUNTER — OFFICE VISIT (OUTPATIENT)
Dept: FAMILY MEDICINE CLINIC | Facility: CLINIC | Age: 52
End: 2022-10-04

## 2022-10-04 VITALS
OXYGEN SATURATION: 98 % | DIASTOLIC BLOOD PRESSURE: 76 MMHG | RESPIRATION RATE: 18 BRPM | SYSTOLIC BLOOD PRESSURE: 118 MMHG | HEART RATE: 93 BPM | TEMPERATURE: 97.7 F | WEIGHT: 290.2 LBS | HEIGHT: 71 IN | BODY MASS INDEX: 40.63 KG/M2

## 2022-10-04 DIAGNOSIS — F17.200 SMOKER: ICD-10-CM

## 2022-10-04 DIAGNOSIS — E66.01 CLASS 3 SEVERE OBESITY DUE TO EXCESS CALORIES WITH SERIOUS COMORBIDITY AND BODY MASS INDEX (BMI) OF 40.0 TO 44.9 IN ADULT: ICD-10-CM

## 2022-10-04 DIAGNOSIS — E11.65 TYPE 2 DIABETES MELLITUS WITH HYPERGLYCEMIA, WITHOUT LONG-TERM CURRENT USE OF INSULIN: ICD-10-CM

## 2022-10-04 DIAGNOSIS — G89.29 CHRONIC PAIN OF RIGHT KNEE: Primary | ICD-10-CM

## 2022-10-04 DIAGNOSIS — M25.561 CHRONIC PAIN OF RIGHT KNEE: Primary | ICD-10-CM

## 2022-10-04 DIAGNOSIS — I10 ESSENTIAL HYPERTENSION: ICD-10-CM

## 2022-10-04 PROBLEM — M51.369 DEGENERATION OF LUMBAR INTERVERTEBRAL DISC: Status: ACTIVE | Noted: 2022-10-04

## 2022-10-04 PROBLEM — M51.36 DEGENERATION OF LUMBAR INTERVERTEBRAL DISC: Status: ACTIVE | Noted: 2022-10-04

## 2022-10-04 PROCEDURE — 99214 OFFICE O/P EST MOD 30 MIN: CPT | Performed by: FAMILY MEDICINE

## 2022-10-04 NOTE — PROGRESS NOTES
"Subjective cc: chronic right knee pain   Roscoe Frias is a 52 y.o. male.     History of Present Illness     Roscoe Frias presents today for follow-up. He denies any changes to his health since his last visit, he has not been evaluated by another physician.     The patient reports worsening of his right knee pain. He notes the medication provides him some relief, he denies any side effects from the medications. He reports swelling in the right knee. He notes the right knee \"pops apart\".     The patient's blood pressure is 118/76 mmHg today.     The patient reports he is tolerating his medications well at this time.     The patient's weight has been consistent around 290 pounds.     The patient states he is doing well in regard to his sleep apnea.     The patient reports smoking at least three times a day. He notes he will quit smoking eventually.     The patient denies chest pains or shortness of breath.    The patient refuses an influenza vaccine today.    The following portions of the patient's history were reviewed and updated as appropriate: allergies, current medications, past family history, past medical history, past social history, past surgical history and problem list.        Review of Systems  A review of systems was performed, and the pertinent positives are noted in the HPI.   Objective   Blood pressure 118/76, pulse 93, temperature 97.7 °F (36.5 °C), resp. rate 18, height 180.3 cm (71\"), weight 132 kg (290 lb 3.2 oz), SpO2 98 %.  Physical Exam  Vitals and nursing note reviewed.   Constitutional:       General: He is not in acute distress.     Appearance: Normal appearance. He is obese. He is not toxic-appearing.   HENT:      Head: Normocephalic and atraumatic.      Right Ear: External ear normal.      Left Ear: External ear normal.   Eyes:      General:         Right eye: No discharge.         Left eye: No discharge.      Conjunctiva/sclera: Conjunctivae normal.   Cardiovascular:      Rate and " Rhythm: Tachycardia present.      Pulses: Normal pulses.   Pulmonary:      Effort: Pulmonary effort is normal. No respiratory distress.      Breath sounds: Normal breath sounds. No wheezing.   Musculoskeletal:         General: Swelling and tenderness present.      Cervical back: Normal range of motion. No rigidity.      Right knee: Swelling and bony tenderness present. Decreased range of motion. Tenderness present.   Lymphadenopathy:      Cervical: No cervical adenopathy.   Skin:     General: Skin is warm and dry.   Neurological:      Mental Status: He is alert and oriented to person, place, and time.      Motor: Weakness present.      Gait: Gait abnormal.   Psychiatric:         Mood and Affect: Mood normal.         Behavior: Behavior normal.         Thought Content: Thought content normal.         Judgment: Judgment normal.         Assessment & Plan   Problems Addressed this Visit        Cardiac and Vasculature    Essential hypertension       Endocrine and Metabolic    Class 3 severe obesity due to excess calories with serious comorbidity and body mass index (BMI) of 40.0 to 44.9 in adult ([facility])      Other Visit Diagnoses     Chronic pain of right knee    -  Primary    Smoker        Type 2 diabetes mellitus with hyperglycemia, without long-term current use of insulin ([facility])          Diagnoses       Codes Comments    Chronic pain of right knee    -  Primary ICD-10-CM: M25.561, G89.29  ICD-9-CM: 719.46, 338.29     Essential hypertension     ICD-10-CM: I10  ICD-9-CM: 401.9     Class 3 severe obesity due to excess calories with serious comorbidity and body mass index (BMI) of 40.0 to 44.9 in adult ([facility])     ICD-10-CM: E66.01, Z68.41  ICD-9-CM: 278.01, V85.41     Smoker     ICD-10-CM: F17.200  ICD-9-CM: 305.1     Type 2 diabetes mellitus with hyperglycemia, without long-term current use of insulin ([facility])     ICD-10-CM: E11.65  ICD-9-CM: 250.00, 790.29       1. Chronic right knee pain: Chronic, stable.  - Patient continues  to have pain in that right knee.  - He is unable to obtain a knee replacement secondary to his cardiac issues and stent placement as he needs to remain on anticoagulants for 12 months.  - We are providing pain medication until he can have his knee replacement.  - Refill was sent on 09/30/2022 for his pain medication.  - CROW reviewed. UDS up to date. Controlled contract in the chart.  - Patient denies any side effects from medication. We will continue on it at this time.  - Patient understands that this is a high risk medication.    2. Hypertension: Chronic, controlled.  - Continue on current medication.    3. Obesity:  - Class 3 Severe Obesity (BMI >=40). Obesity-related health conditions include the following: hypertension, coronary heart disease and osteoarthritis. Obesity is unchanged. BMI is is above average; BMI management plan is completed. We discussed portion control and increasing exercise.     4. Smoker:  - Roscoe Frias  reports that he has been smoking cigarettes. He started smoking about 7 years ago. He has been smoking about 0.50 packs per day. He has never used smokeless tobacco.. I have educated him on the risk of diseases from using tobacco products such as cancer, COPD and heart disease.     I advised him to quit and he is not willing to quit.    I spent 2 minutes counseling the patient.      5. Diabetes mellitus: Chronic, uncontrolled.  - Patient has a hemoglobin A1c of 8.3. This has been increasing over the past year.  - Patient will be due next month to have this rechecked for reevaluation.  - At this time, he is not on any medication for diabetes.  - He is attempting to lifestyle changes.  - If still uncontrolled at next visit, we will discuss medication treatment options.      Transcribed from ambient dictation for Barbara Waters MD by Jeri Tolentino.  10/04/22   08:13 CDT    Patient verbalized consent to the visit recording.  I have personally performed the services described in  this document as transcribed by the above individual, and it is both accurate and complete.             This document has been electronically signed by Barbara Waters MD on October 4, 2022 15:03 CDT

## 2022-10-11 PROCEDURE — G2066 INTER DEVC REMOTE 30D: HCPCS | Performed by: INTERNAL MEDICINE

## 2022-10-11 PROCEDURE — 93298 REM INTERROG DEV EVAL SCRMS: CPT | Performed by: INTERNAL MEDICINE

## 2022-10-23 DIAGNOSIS — I10 ESSENTIAL HYPERTENSION: ICD-10-CM

## 2022-10-24 RX ORDER — LISINOPRIL 40 MG/1
TABLET ORAL
Qty: 180 TABLET | Refills: 0 | Status: SHIPPED | OUTPATIENT
Start: 2022-10-24 | End: 2023-01-26

## 2022-10-24 RX ORDER — CLONIDINE HYDROCHLORIDE 0.1 MG/1
TABLET ORAL
Qty: 180 TABLET | Refills: 0 | Status: SHIPPED | OUTPATIENT
Start: 2022-10-24 | End: 2023-01-26

## 2022-10-24 NOTE — TELEPHONE ENCOUNTER
Rx Refill Note  Requested Prescriptions     Pending Prescriptions Disp Refills   • lisinopril (PRINIVIL,ZESTRIL) 40 MG tablet [Pharmacy Med Name: Lisinopril 40 MG Oral Tablet] 180 tablet 0     Sig: Take 1 tablet by mouth twice daily      Last office visit with prescribing clinician: 10/4/2022      Next office visit with prescribing clinician: 10/31/2022            Kylie Ardon MA  10/24/22, 09:34 CDT

## 2022-10-24 NOTE — TELEPHONE ENCOUNTER
Rx Refill Note  Requested Prescriptions     Pending Prescriptions Disp Refills   • cloNIDine (CATAPRES) 0.1 MG tablet [Pharmacy Med Name: cloNIDine HCl 0.1 MG Oral Tablet] 180 tablet 0     Sig: Take 1 tablet by mouth twice daily      Last office visit with prescribing clinician: 10/04/2022      Next office visit with prescribing clinician: 10/31/2022            Kylie Ardon MA  10/24/22, 09:33 CDT

## 2022-10-31 ENCOUNTER — OFFICE VISIT (OUTPATIENT)
Dept: FAMILY MEDICINE CLINIC | Facility: CLINIC | Age: 52
End: 2022-10-31

## 2022-10-31 VITALS
HEART RATE: 82 BPM | SYSTOLIC BLOOD PRESSURE: 142 MMHG | RESPIRATION RATE: 20 BRPM | OXYGEN SATURATION: 96 % | HEIGHT: 71 IN | WEIGHT: 277.6 LBS | DIASTOLIC BLOOD PRESSURE: 80 MMHG | BODY MASS INDEX: 38.86 KG/M2 | TEMPERATURE: 97.3 F

## 2022-10-31 DIAGNOSIS — I10 ESSENTIAL HYPERTENSION: ICD-10-CM

## 2022-10-31 DIAGNOSIS — E66.01 CLASS 2 SEVERE OBESITY DUE TO EXCESS CALORIES WITH SERIOUS COMORBIDITY AND BODY MASS INDEX (BMI) OF 38.0 TO 38.9 IN ADULT: ICD-10-CM

## 2022-10-31 DIAGNOSIS — G89.29 CHRONIC PAIN OF RIGHT KNEE: ICD-10-CM

## 2022-10-31 DIAGNOSIS — M25.561 CHRONIC PAIN OF RIGHT KNEE: ICD-10-CM

## 2022-10-31 DIAGNOSIS — M54.50 CHRONIC BILATERAL LOW BACK PAIN, UNSPECIFIED WHETHER SCIATICA PRESENT: Primary | ICD-10-CM

## 2022-10-31 DIAGNOSIS — G89.29 CHRONIC BILATERAL LOW BACK PAIN, UNSPECIFIED WHETHER SCIATICA PRESENT: Primary | ICD-10-CM

## 2022-10-31 PROCEDURE — 99214 OFFICE O/P EST MOD 30 MIN: CPT | Performed by: FAMILY MEDICINE

## 2022-10-31 RX ORDER — HYDROCODONE BITARTRATE AND ACETAMINOPHEN 7.5; 325 MG/1; MG/1
1 TABLET ORAL EVERY 6 HOURS PRN
Qty: 120 TABLET | Refills: 0 | Status: SHIPPED | OUTPATIENT
Start: 2022-10-31 | End: 2022-11-30 | Stop reason: SDUPTHER

## 2022-11-01 ENCOUNTER — APPOINTMENT (OUTPATIENT)
Dept: GENERAL RADIOLOGY | Facility: HOSPITAL | Age: 52
End: 2022-11-01

## 2022-11-01 ENCOUNTER — HOSPITAL ENCOUNTER (EMERGENCY)
Facility: HOSPITAL | Age: 52
Discharge: HOME | End: 2022-11-01
Admitting: STUDENT IN AN ORGANIZED HEALTH CARE EDUCATION/TRAINING PROGRAM

## 2022-11-01 VITALS
TEMPERATURE: 99 F | OXYGEN SATURATION: 95 % | HEART RATE: 82 BPM | BODY MASS INDEX: 39.37 KG/M2 | WEIGHT: 275 LBS | DIASTOLIC BLOOD PRESSURE: 80 MMHG | RESPIRATION RATE: 16 BRPM | HEIGHT: 70 IN | SYSTOLIC BLOOD PRESSURE: 104 MMHG

## 2022-11-01 DIAGNOSIS — B34.9 VIRAL ILLNESS: ICD-10-CM

## 2022-11-01 DIAGNOSIS — M79.10 MYALGIA: Primary | ICD-10-CM

## 2022-11-01 DIAGNOSIS — Z20.822 EXPOSURE TO COVID-19 VIRUS: ICD-10-CM

## 2022-11-01 DIAGNOSIS — R73.9 HYPERGLYCEMIA: ICD-10-CM

## 2022-11-01 LAB
ACETONE BLD QL: NEGATIVE
ALBUMIN SERPL-MCNC: 4.3 G/DL (ref 3.5–5.2)
ALBUMIN/GLOB SERPL: 1.3 G/DL
ALP SERPL-CCNC: 109 U/L (ref 39–117)
ALT SERPL W P-5'-P-CCNC: 32 U/L (ref 1–41)
ANION GAP SERPL CALCULATED.3IONS-SCNC: 12 MMOL/L (ref 5–15)
AST SERPL-CCNC: 25 U/L (ref 1–40)
BASOPHILS # BLD AUTO: 0.06 10*3/MM3 (ref 0–0.2)
BASOPHILS NFR BLD AUTO: 0.8 % (ref 0–1.5)
BILIRUB SERPL-MCNC: 0.4 MG/DL (ref 0–1.2)
BUN SERPL-MCNC: 14 MG/DL (ref 6–20)
BUN/CREAT SERPL: 11.9 (ref 7–25)
CALCIUM SPEC-SCNC: 9.1 MG/DL (ref 8.6–10.5)
CHLORIDE SERPL-SCNC: 100 MMOL/L (ref 98–107)
CO2 SERPL-SCNC: 24 MMOL/L (ref 22–29)
CREAT SERPL-MCNC: 1.18 MG/DL (ref 0.76–1.27)
DEPRECATED RDW RBC AUTO: 41.8 FL (ref 37–54)
EGFRCR SERPLBLD CKD-EPI 2021: 74.2 ML/MIN/1.73
EOSINOPHIL # BLD AUTO: 0.09 10*3/MM3 (ref 0–0.4)
EOSINOPHIL NFR BLD AUTO: 1.1 % (ref 0.3–6.2)
ERYTHROCYTE [DISTWIDTH] IN BLOOD BY AUTOMATED COUNT: 13 % (ref 12.3–15.4)
FLUAV RNA RESP QL NAA+PROBE: NOT DETECTED
FLUBV RNA RESP QL NAA+PROBE: NOT DETECTED
GLOBULIN UR ELPH-MCNC: 3.4 GM/DL
GLUCOSE BLDC GLUCOMTR-MCNC: 324 MG/DL (ref 70–130)
GLUCOSE SERPL-MCNC: 481 MG/DL (ref 65–99)
HCT VFR BLD AUTO: 53.6 % (ref 37.5–51)
HGB BLD-MCNC: 17.8 G/DL (ref 13–17.7)
HOLD SPECIMEN: NORMAL
HOLD SPECIMEN: NORMAL
IMM GRANULOCYTES # BLD AUTO: 0.03 10*3/MM3 (ref 0–0.05)
IMM GRANULOCYTES NFR BLD AUTO: 0.4 % (ref 0–0.5)
INR PPP: 1.22 (ref 0.91–1.09)
LYMPHOCYTES # BLD AUTO: 2.73 10*3/MM3 (ref 0.7–3.1)
LYMPHOCYTES NFR BLD AUTO: 34.5 % (ref 19.6–45.3)
MAGNESIUM SERPL-MCNC: 2.2 MG/DL (ref 1.6–2.6)
MCH RBC QN AUTO: 29.1 PG (ref 26.6–33)
MCHC RBC AUTO-ENTMCNC: 33.2 G/DL (ref 31.5–35.7)
MCV RBC AUTO: 87.7 FL (ref 79–97)
MONOCYTES # BLD AUTO: 0.68 10*3/MM3 (ref 0.1–0.9)
MONOCYTES NFR BLD AUTO: 8.6 % (ref 5–12)
NEUTROPHILS NFR BLD AUTO: 4.33 10*3/MM3 (ref 1.7–7)
NEUTROPHILS NFR BLD AUTO: 54.6 % (ref 42.7–76)
NRBC BLD AUTO-RTO: 0 /100 WBC (ref 0–0.2)
PLATELET # BLD AUTO: 245 10*3/MM3 (ref 140–450)
PMV BLD AUTO: 10.5 FL (ref 6–12)
POTASSIUM SERPL-SCNC: 4.1 MMOL/L (ref 3.5–5.2)
PROT SERPL-MCNC: 7.7 G/DL (ref 6–8.5)
PROTHROMBIN TIME: 15 SECONDS (ref 11.9–14.6)
RBC # BLD AUTO: 6.11 10*6/MM3 (ref 4.14–5.8)
SARS-COV-2 RNA RESP QL NAA+PROBE: NOT DETECTED
SODIUM SERPL-SCNC: 136 MMOL/L (ref 136–145)
TROPONIN T SERPL-MCNC: <0.01 NG/ML (ref 0–0.03)
WBC NRBC COR # BLD: 7.92 10*3/MM3 (ref 3.4–10.8)
WHOLE BLOOD HOLD COAG: NORMAL
WHOLE BLOOD HOLD SPECIMEN: NORMAL

## 2022-11-01 PROCEDURE — 71045 X-RAY EXAM CHEST 1 VIEW: CPT

## 2022-11-01 PROCEDURE — 87636 SARSCOV2 & INF A&B AMP PRB: CPT | Performed by: NURSE PRACTITIONER

## 2022-11-01 PROCEDURE — 83735 ASSAY OF MAGNESIUM: CPT | Performed by: STUDENT IN AN ORGANIZED HEALTH CARE EDUCATION/TRAINING PROGRAM

## 2022-11-01 PROCEDURE — 82962 GLUCOSE BLOOD TEST: CPT

## 2022-11-01 PROCEDURE — 93010 ELECTROCARDIOGRAM REPORT: CPT | Performed by: EMERGENCY MEDICINE

## 2022-11-01 PROCEDURE — 99284 EMERGENCY DEPT VISIT MOD MDM: CPT

## 2022-11-01 PROCEDURE — 84484 ASSAY OF TROPONIN QUANT: CPT | Performed by: STUDENT IN AN ORGANIZED HEALTH CARE EDUCATION/TRAINING PROGRAM

## 2022-11-01 PROCEDURE — 36415 COLL VENOUS BLD VENIPUNCTURE: CPT

## 2022-11-01 PROCEDURE — 85025 COMPLETE CBC W/AUTO DIFF WBC: CPT | Performed by: STUDENT IN AN ORGANIZED HEALTH CARE EDUCATION/TRAINING PROGRAM

## 2022-11-01 PROCEDURE — 93005 ELECTROCARDIOGRAM TRACING: CPT | Performed by: STUDENT IN AN ORGANIZED HEALTH CARE EDUCATION/TRAINING PROGRAM

## 2022-11-01 PROCEDURE — 80053 COMPREHEN METABOLIC PANEL: CPT | Performed by: STUDENT IN AN ORGANIZED HEALTH CARE EDUCATION/TRAINING PROGRAM

## 2022-11-01 PROCEDURE — 85610 PROTHROMBIN TIME: CPT | Performed by: STUDENT IN AN ORGANIZED HEALTH CARE EDUCATION/TRAINING PROGRAM

## 2022-11-01 PROCEDURE — 82009 KETONE BODYS QUAL: CPT | Performed by: NURSE PRACTITIONER

## 2022-11-01 RX ORDER — ONDANSETRON 4 MG/1
4 TABLET, ORALLY DISINTEGRATING ORAL ONCE
Status: DISCONTINUED | OUTPATIENT
Start: 2022-11-01 | End: 2022-11-02 | Stop reason: HOSPADM

## 2022-11-01 RX ORDER — OXYCODONE AND ACETAMINOPHEN 7.5; 325 MG/1; MG/1
1 TABLET ORAL ONCE
Status: DISCONTINUED | OUTPATIENT
Start: 2022-11-01 | End: 2022-11-02 | Stop reason: HOSPADM

## 2022-11-01 RX ORDER — SODIUM CHLORIDE 0.9 % (FLUSH) 0.9 %
10 SYRINGE (ML) INJECTION AS NEEDED
Status: DISCONTINUED | OUTPATIENT
Start: 2022-11-01 | End: 2022-11-02 | Stop reason: HOSPADM

## 2022-11-02 LAB
QT INTERVAL: 368 MS
QTC INTERVAL: 427 MS

## 2022-11-02 NOTE — DISCHARGE INSTRUCTIONS
Your cardiac marker and ekg was within normal limits  Blood sugar was very elevated at 481, repeat improved to 324. Your acetone was negative however you refused ABGs. Given that your acetone is negative unlikely for DKA. WBC, kidney function, and liver enzymes were within normal limits. You did not provide urine for urinalysis. You will need a very close f/u with your pcp given your elevated blood sugar findings.

## 2022-11-02 NOTE — ED NOTES
"Upon entering pt's room to insert pt's IV and administer fluid, pt stated \"No more pokes, I'm done. I'm fixing to just walk out of here because I'm getting no where.\" Eden TROTTER notified  "

## 2022-11-02 NOTE — ED NOTES
"Pt has stated multiple times \"I ain't going to be able to pee unless yall give me something to drink.\"  "

## 2022-11-04 NOTE — ED PROVIDER NOTES
"Subjective   History of Present Illness  Patient is a 52-year-old male who presents to the ER with complaints of body aches for the past 4 days.  Patient states, \"I just feel bad all over.\"  Patient reports a mild cough with sneezing and runny nose.  He denies any nausea or vomiting.  He has had no diarrhea.  He has had a low-grade fever.  Wife is taking care of someone who recently tested positive for COVID.  Patient had reported chest pain and shortness of breath in triage however he denies any chest pain or shortness of breath to this examiner.  He states he just feels poorly all over.  Patient is somewhat of a difficult historian and does not seem to want to answer most questions being asked. At any rate, from what I gather patient has been exposed to covid and has had upper respiratory sxs.        Review of Systems   Constitutional: Positive for fatigue and fever.   HENT: Positive for congestion and rhinorrhea.    Eyes: Negative.    Respiratory: Positive for cough and shortness of breath.    Cardiovascular: Negative.  Negative for chest pain.   Gastrointestinal: Negative.  Negative for abdominal pain, diarrhea, nausea and vomiting.   Genitourinary: Negative.    Musculoskeletal: Positive for back pain and myalgias.   Skin: Negative.  Negative for rash.   All other systems reviewed and are negative.      Past Medical History:   Diagnosis Date   • Hypertension    • Stroke (HCC)        Allergies   Allergen Reactions   • Hctz [Hydrochlorothiazide] Other (See Comments)     Makes angry     • Magnesium-Containing Compounds    • Toradol [Ketorolac Tromethamine]    • Tramadol        Past Surgical History:   Procedure Laterality Date   • BACK SURGERY     • CARDIAC CATHETERIZATION N/A 7/12/2022    Procedure: Cardiac Catheterization/Vascular Study DAYSI OK  He will wait for knee replacement for 1 year;  Surgeon: Kevin Garcia MD;  Location:  [facility];  Service: Cardiology;  Laterality: N/A;   • NECK " SURGERY         Family History   Problem Relation Age of Onset   • No Known Problems Mother    • No Known Problems Father    • No Known Problems Sister    • No Known Problems Brother    • No Known Problems Son    • No Known Problems Daughter    • No Known Problems Maternal Grandmother    • No Known Problems Maternal Grandfather    • No Known Problems Paternal Grandmother    • No Known Problems Paternal Grandfather    • No Known Problems Cousin    • Rheum arthritis Neg Hx    • Osteoarthritis Neg Hx    • Asthma Neg Hx    • Diabetes Neg Hx    • Heart failure Neg Hx    • Hyperlipidemia Neg Hx    • Hypertension Neg Hx    • Migraines Neg Hx    • Rashes / Skin problems Neg Hx    • Seizures Neg Hx    • Stroke Neg Hx    • Thyroid disease Neg Hx        Social History     Socioeconomic History   • Marital status: Single   Tobacco Use   • Smoking status: Every Day     Packs/day: 0.50     Types: Cigarettes     Start date: 2015   • Smokeless tobacco: Never   Vaping Use   • Vaping Use: Never used   Substance and Sexual Activity   • Alcohol use: No   • Drug use: No   • Sexual activity: Defer           Objective   Physical Exam  Vitals and nursing note reviewed.   Constitutional:       General: He is not in acute distress.     Appearance: He is well-developed. He is not diaphoretic.   HENT:      Head: Normocephalic and atraumatic.      Nose: Nose normal.   Eyes:      General: No scleral icterus.     Conjunctiva/sclera: Conjunctivae normal.      Pupils: Pupils are equal, round, and reactive to light.   Neck:      Thyroid: No thyromegaly.      Vascular: No JVD.   Cardiovascular:      Rate and Rhythm: Normal rate and regular rhythm.      Heart sounds: Normal heart sounds. No murmur heard.  Pulmonary:      Effort: Pulmonary effort is normal. No respiratory distress.      Breath sounds: Rales present. No wheezing.   Chest:      Chest wall: No tenderness.   Abdominal:      General: Bowel sounds are normal. There is no distension.       Palpations: Abdomen is soft. There is no mass.      Tenderness: There is no abdominal tenderness. There is no guarding or rebound.   Musculoskeletal:         General: Normal range of motion.      Cervical back: Normal range of motion and neck supple.   Lymphadenopathy:      Cervical: No cervical adenopathy.   Skin:     General: Skin is warm and dry.      Capillary Refill: Capillary refill takes less than 2 seconds.      Coloration: Skin is not pale.      Findings: No erythema or rash.   Neurological:      General: No focal deficit present.      Mental Status: He is alert and oriented to person, place, and time.      Cranial Nerves: No cranial nerve deficit.      Coordination: Coordination normal.      Deep Tendon Reflexes: Reflexes are normal and symmetric.   Psychiatric:         Behavior: Behavior is agitated.         Thought Content: Thought content normal.         Judgment: Judgment normal.         Procedures           ED Course we have seen numerous viral illnesses in the emergency department within the past several weeks.  They continue to sign into the ER throughout the night with similar complaints as this patient.  Patient states that he has had an exposure to COVID.  He complains of body aches for the past several days with mild upper respiratory symptoms.  Patient's COVID and influenza were both negative however we have seen other viral respiratory illnesses on the full respiratory panel.  Patient denies any chest pain or shortness of breath.  He has refused part of his work-up while in the emergency department.  We offered a pain pill for his body aches and pain however he refused.  Patient has been quite difficult per the nursing staff.  He will need to follow-up with his PCP for further evaluation.  Patient's blood sugar is elevated and he tells me that he has not been diagnosed with diabetes.  He refused ABGs.  He will need hemoglobin A1c and reevaluation of labs.  Patient will need to return with any  acute or worsening symptoms.  Patient expressed understanding.  XR Chest 1 View   Final Result   1. No acute appearing infiltrate or effusion.   2. Mild bronchial wall thickening.           This report was finalized on 11/01/2022 21:15 by Dr. Pancho Beatty MD.        Labs Reviewed   COMPREHENSIVE METABOLIC PANEL - Abnormal; Notable for the following components:       Result Value    Glucose 481 (*)     All other components within normal limits    Narrative:     GFR Normal >60  Chronic Kidney Disease <60  Kidney Failure <15     PROTIME-INR - Abnormal; Notable for the following components:    Protime 15.0 (*)     INR 1.22 (*)     All other components within normal limits   CBC WITH AUTO DIFFERENTIAL - Abnormal; Notable for the following components:    RBC 6.11 (*)     Hemoglobin 17.8 (*)     Hematocrit 53.6 (*)     All other components within normal limits   POCT GLUCOSE FINGERSTICK - Abnormal; Notable for the following components:    Glucose 324 (*)     All other components within normal limits   COVID-19 AND FLU A/B, NP SWAB IN TRANSPORT MEDIA 8-12 HR TAT - Normal    Narrative:     Fact sheet for providers: https://www.fda.gov/media/318331/download    Fact sheet for patients: https://www.fda.gov/media/366693/download    Test performed by PCR.   TROPONIN (IN-HOUSE) - Normal    Narrative:     Troponin T Reference Range:  <= 0.03 ng/mL-   Negative for AMI  >0.03 ng/mL-     Abnormal for myocardial necrosis.  Clinicians would have to utilize clinical acumen, EKG, Troponin and serial changes to determine if it is an Acute Myocardial Infarction or myocardial injury due to an underlying chronic condition.       Results may be falsely decreased if patient taking Biotin.     MAGNESIUM - Normal   ACETONE - Normal   RAINBOW DRAW    Narrative:     The following orders were created for panel order [facility] Draw.  Procedure                               Abnormality         Status                     ---------                                -----------         ------                     Green Top (Gel)[[id number]]                                  Final result               Lavender Top[[id number]]                                     Final result               Red Top[[id number]]                                          Final result               Light Blue Top[[id number]]                                   Final result                 Please view results for these tests on the individual orders.   POCT GLUCOSE FINGERSTICK   CBC AND DIFFERENTIAL    Narrative:     The following orders were created for panel order CBC & Differential.  Procedure                               Abnormality         Status                     ---------                               -----------         ------                     CBC Auto Differential[[id number]]        Abnormal            Final result                 Please view results for these tests on the individual orders.   GREEN TOP   LAVENDER TOP   RED TOP   LIGHT BLUE TOP                                              MDM  Number of Diagnoses or Management Options  Exposure to COVID-19 virus: new and requires workup  Hyperglycemia: new and requires workup  Myalgia: new and requires workup  Viral illness: new and requires workup     Amount and/or Complexity of Data Reviewed  Clinical lab tests: ordered and reviewed  Tests in the radiology section of CPT®: ordered and reviewed  Decide to obtain previous medical records or to obtain history from someone other than the patient: yes  Discuss the patient with other providers: yes    Risk of Complications, Morbidity, and/or Mortality  Presenting problems: moderate  Diagnostic procedures: moderate  Management options: moderate    Patient Progress  Patient progress: stable      Final diagnoses:   Myalgia   Viral illness   Hyperglycemia   Exposure to COVID-19 virus       ED Disposition  ED Disposition     ED Disposition   Discharge    Condition   Good    Comment   --              No follow-up provider specified.       Medication List      No changes were made to your prescriptions during this visit.          Eden Majano, APRN  11/04/22 0334

## 2022-11-08 PROBLEM — Z95.5 PRESENCE OF DRUG COATED STENT IN LAD CORONARY ARTERY: Status: ACTIVE | Noted: 2022-02-22

## 2022-11-08 NOTE — PROGRESS NOTES
Chief Complaint  Coronary Artery Disease (3mo F/U. ) and Hypertension    Subjective          Roscoe Frias presents to [facility] [id number] for routine follow-up. He has coronary artery disease status post 2.25 x 12 mm Synergy drug-eluting stent to the mid LAD 7/12/2022, hypertension, hyperlipidemia, previous cerebrovascular accident X4, TIAs, arthritis, obstructive sleep apnea and morbid obesity. He complains of increased fatigue and decreased stamina since having stent placed four months ago. He reports intermittent, exertional, left sided chest pain that does not radiate and is not associated with any other symptoms. The pain lasts for a few minutes before resolving on its own. Patient denies shortness of breath, palpitations, dizziness, syncope, orthopnea, PND, edema.  Patient denies any signs of bleeding.    Hypertension  This is a chronic problem. The current episode started more than 1 year ago. The problem is controlled. Associated symptoms include chest pain and malaise/fatigue. Pertinent negatives include no anxiety, blurred vision, headaches, neck pain, orthopnea, palpitations, peripheral edema, PND, shortness of breath or sweats. Risk factors for coronary artery disease include male gender, obesity and dyslipidemia. Current antihypertension treatment includes central alpha agonists and ACE inhibitors. The current treatment provides significant improvement. Hypertensive end-organ damage includes CAD/MI and CVA.   Hyperlipidemia  This is a chronic problem. The current episode started more than 1 year ago. Exacerbating diseases include obesity. Associated symptoms include chest pain. Pertinent negatives include no shortness of breath. Current antihyperlipidemic treatment includes statins. Risk factors for coronary artery disease include male sex, obesity, hypertension and dyslipidemia.   Coronary Artery Disease  Presents for follow-up visit. Symptoms include chest pain.  "Pertinent negatives include no chest pressure, chest tightness, dizziness, leg swelling, muscle weakness, palpitations, shortness of breath or weight gain. Risk factors include hyperlipidemia and obesity. The symptoms have been stable. Compliance with diet is variable. Compliance with exercise is variable. Compliance with medications is good.       Objective   Vital Signs:   /92   Pulse 81   Ht 177.8 cm (70\")   Wt 125 kg (275 lb)   SpO2 97%   BMI 39.46 kg/m²     Vitals and nursing note reviewed.   Constitutional:       General: Awake.      Appearance: Normal and healthy appearance. Well-developed and not in distress. Morbidly obese.   Eyes:      General: Lids are normal.      Conjunctiva/sclera: Conjunctivae normal.      Pupils: Pupils are equal, round, and reactive to light.   HENT:      Head: Normocephalic and atraumatic.      Nose: Nose normal.   Neck:      Vascular: No JVR. JVD normal.   Pulmonary:      Effort: Pulmonary effort is normal.      Breath sounds: Normal breath sounds. No wheezing. No rhonchi. No rales.   Chest:      Chest wall: Not tender to palpatation.   Cardiovascular:      PMI at left midclavicular line. Normal rate. Regular rhythm. Normal S1. Normal S2.      Murmurs: There is no murmur.      No gallop. No click. No rub.   Pulses:     Intact distal pulses.   Edema:     Peripheral edema absent.   Abdominal:      General: Bowel sounds are normal.      Palpations: Abdomen is soft.      Tenderness: There is no abdominal tenderness.   Musculoskeletal: Normal range of motion.         General: No tenderness.      Cervical back: Normal range of motion. Skin:     General: Skin is warm and dry.   Neurological:      General: No focal deficit present.      Mental Status: Alert, oriented to person, place, and time and oriented to person, place and time.   Psychiatric:         Attention and Perception: Attention and perception normal.         Mood and Affect: Mood and affect normal.         Speech: " Speech normal.         Behavior: Behavior normal. Behavior is cooperative.         Thought Content: Thought content normal.         Cognition and Memory: Cognition and memory normal.         Judgment: Judgment normal.        Result Review :   The following data was reviewed by: ROSE MARIE Root on 11/9/2022:  Common labs    Common Labs 7/12/22 7/12/22 7/13/22 7/13/22 7/13/22 7/13/22 11/1/22 11/1/22    0934 0934 0714 0714 0714 0714 1952 1952   Glucose  186 (A)   186 (A)   481 (A)   BUN  17   15   14   Creatinine  1.00   1.07   1.18   Sodium  141   137   136   Potassium  4.0   3.8   4.1   Chloride  104   103   100   Calcium  8.9   9.2   9.1   Albumin  4.00      4.30   Total Bilirubin  0.5      0.4   Alkaline Phosphatase  73      109   AST (SGOT)  25      25   ALT (SGPT)  33      32   WBC 7.86  6.14    7.92    Hemoglobin 18.2 (A)  17.4    17.8 (A)    Hematocrit 55.8 (A)  52.2 (A)    53.6 (A)    Platelets 259  225    245    Total Cholesterol    179       Triglycerides    95       HDL Cholesterol    33 (A)       LDL Cholesterol     128 (A)       Hemoglobin A1C      8.30 (A)     (A) Abnormal value            Data reviewed: Cardiology studies holter monitor 3/19/22, lexiscan 4/20/22 and 2d echo 5/5/22.           Assessment and Plan    Diagnoses and all orders for this visit:    1. Essential hypertension (Primary)-blood pressure is elevated in office today, however pt reports that he has not taken his morning medications yet. He states that blood pressures are well controlled at home with readings consistently lower than 130/80. Continue lisinopril and clonidine.  Monitor and record daily blood pressure. Report readings consistently higher than 130/80 or consistently lower than 100/60.     2. Mixed hyperlipidemia-management per PCP.  Continue pravastatin.    3. Class 2 severe obesity due to excess calories with serious comorbidity and body mass index (BMI) of 39.0 to 39.9 in adult (HCC)- Class 3 Severe Obesity (BMI  >=40). Obesity-related health conditions include the following: hypertension and dyslipidemias. Obesity is unchanged. BMI is is above average; no BMI management plan is appropriate. We discussed low calorie, low carb based diet program, portion control and increasing exercise.    4. Obstructive sleep apnea- pt reports non-compliance with CPAP. He has been advised of the health risks of untreated sleep apnea, including hypertension, atrial fibrillation and heart failure. He verbalized understanding.     5. Coronary artery disease involving native coronary artery of native heart with stable angina pectoris (HCC)- check lexiscan. Discussed initiating Imdur, however pt declines at this time.     6. Presence of drug coated stent in LAD coronary artery-2.25 x 12 mm Synergy drug-eluting stent to the mid LAD 7/12/2022.  Patient continues on aspirin and Brilinta.  Denies bleeding.     Follow Up   Return in about 4 weeks (around 12/7/2022) for Next scheduled follow up.  Patient was given instructions and counseling regarding his condition or for health maintenance advice. Please see specific information pulled into the AVS if appropriate.

## 2022-11-09 ENCOUNTER — OFFICE VISIT (OUTPATIENT)
Dept: CARDIOLOGY | Facility: CLINIC | Age: 52
End: 2022-11-09

## 2022-11-09 VITALS
HEIGHT: 70 IN | HEART RATE: 81 BPM | WEIGHT: 275 LBS | BODY MASS INDEX: 39.37 KG/M2 | DIASTOLIC BLOOD PRESSURE: 92 MMHG | SYSTOLIC BLOOD PRESSURE: 144 MMHG | OXYGEN SATURATION: 97 %

## 2022-11-09 DIAGNOSIS — E66.01 CLASS 2 SEVERE OBESITY DUE TO EXCESS CALORIES WITH SERIOUS COMORBIDITY AND BODY MASS INDEX (BMI) OF 39.0 TO 39.9 IN ADULT: ICD-10-CM

## 2022-11-09 DIAGNOSIS — I10 ESSENTIAL HYPERTENSION: Primary | ICD-10-CM

## 2022-11-09 DIAGNOSIS — I25.118 CORONARY ARTERY DISEASE OF NATIVE ARTERY OF NATIVE HEART WITH STABLE ANGINA PECTORIS: ICD-10-CM

## 2022-11-09 DIAGNOSIS — Z95.5 PRESENCE OF DRUG COATED STENT IN LAD CORONARY ARTERY: ICD-10-CM

## 2022-11-09 DIAGNOSIS — E78.2 MIXED HYPERLIPIDEMIA: ICD-10-CM

## 2022-11-09 PROCEDURE — 99214 OFFICE O/P EST MOD 30 MIN: CPT | Performed by: NURSE PRACTITIONER

## 2022-11-30 ENCOUNTER — OFFICE VISIT (OUTPATIENT)
Dept: FAMILY MEDICINE CLINIC | Facility: CLINIC | Age: 52
End: 2022-11-30

## 2022-11-30 VITALS
WEIGHT: 271.8 LBS | SYSTOLIC BLOOD PRESSURE: 115 MMHG | TEMPERATURE: 98.2 F | BODY MASS INDEX: 38.91 KG/M2 | OXYGEN SATURATION: 98 % | HEART RATE: 97 BPM | RESPIRATION RATE: 20 BRPM | DIASTOLIC BLOOD PRESSURE: 82 MMHG | HEIGHT: 70 IN

## 2022-11-30 DIAGNOSIS — G89.29 CHRONIC PAIN OF RIGHT KNEE: Primary | ICD-10-CM

## 2022-11-30 DIAGNOSIS — F17.200 SMOKER: ICD-10-CM

## 2022-11-30 DIAGNOSIS — E66.01 CLASS 2 SEVERE OBESITY DUE TO EXCESS CALORIES WITH SERIOUS COMORBIDITY AND BODY MASS INDEX (BMI) OF 39.0 TO 39.9 IN ADULT: ICD-10-CM

## 2022-11-30 DIAGNOSIS — M25.561 CHRONIC PAIN OF RIGHT KNEE: Primary | ICD-10-CM

## 2022-11-30 DIAGNOSIS — I10 ESSENTIAL HYPERTENSION: ICD-10-CM

## 2022-11-30 PROCEDURE — 99214 OFFICE O/P EST MOD 30 MIN: CPT | Performed by: FAMILY MEDICINE

## 2022-11-30 RX ORDER — HYDROCODONE BITARTRATE AND ACETAMINOPHEN 7.5; 325 MG/1; MG/1
1 TABLET ORAL EVERY 6 HOURS PRN
Qty: 120 TABLET | Refills: 0 | Status: SHIPPED | OUTPATIENT
Start: 2022-11-30 | End: 2022-12-29 | Stop reason: SDUPTHER

## 2022-11-30 NOTE — PROGRESS NOTES
"Subjective cc: chronic right knee pain   Roscoe Frias is a 52 y.o. male with CAD, tob abuse, and obesity who presents for meidcation refill on his chronic pain for his right knee.  Plans for knee surgery however had to postpone because of his CAD   Providing pain medication until pt can undergo surgery   He reports he is doing well - increasing the medication at his last viist made a significant difference   Down to 1-2 cig/day, is anxious - doenst need medication for it at this time    HTN controlled   Weight trending down     History of Present Illness     The following portions of the patient's history were reviewed and updated as appropriate: allergies, current medications, past family history, past medical history, past social history, past surgical history and problem list.        Review of Systems    Objective   Blood pressure 115/82, pulse 97, temperature 98.2 °F (36.8 °C), temperature source Infrared, resp. rate 20, height 177.8 cm (70\"), weight 123 kg (271 lb 12.8 oz), SpO2 98 %.  Physical Exam  Vitals and nursing note reviewed.   Constitutional:       General: He is not in acute distress.     Appearance: Normal appearance. He is obese. He is not toxic-appearing.   HENT:      Nose: Nose normal.   Eyes:      General:         Right eye: No discharge.         Left eye: No discharge.      Conjunctiva/sclera: Conjunctivae normal.   Cardiovascular:      Rate and Rhythm: Regular rhythm. Tachycardia present.      Pulses: Normal pulses.   Pulmonary:      Effort: Pulmonary effort is normal. No respiratory distress.      Breath sounds: No wheezing.   Musculoskeletal:         General: Tenderness present.   Skin:     General: Skin is warm and dry.   Neurological:      Mental Status: He is alert and oriented to person, place, and time.      Gait: Gait abnormal.   Psychiatric:         Mood and Affect: Mood normal.         Behavior: Behavior normal.         Thought Content: Thought content normal.         Judgment: " Judgment normal.         Assessment & Plan   Problems Addressed this Visit        Cardiac and Vasculature    Essential hypertension       Endocrine and Metabolic    Class 2 severe obesity due to excess calories with serious comorbidity and body mass index (BMI) of 39.0 to 39.9 in adult ([facility])       Musculoskeletal and Injuries    Chronic pain of right knee - Primary    Relevant Medications    HYDROcodone-acetaminophen (NORCO) 7.5-325 MG per tablet   Other Visit Diagnoses     Smoker          Diagnoses       Codes Comments    Chronic pain of right knee    -  Primary ICD-10-CM: M25.561, G89.29  ICD-9-CM: 719.46, 338.29     Essential hypertension     ICD-10-CM: I10  ICD-9-CM: 401.9     Class 2 severe obesity due to excess calories with serious comorbidity and body mass index (BMI) of 39.0 to 39.9 in adult ([facility])     ICD-10-CM: E66.01, Z68.39  ICD-9-CM: 278.01, V85.39     Smoker     ICD-10-CM: F17.200  ICD-9-CM: 305.1           PLAN:     #1 chronic right knee pain: chronic, controlled, ocnitnue on current medicaiton, UDS UTD, controlled contract in chart, roxana reviewed and appropriate, refill given     #2 smoker: Roscoe Frias  reports that he has been smoking cigarettes. He started smoking about 7 years ago. He has been smoking an average of .5 packs per day. He has never used smokeless tobacco.. I have educated him on the risk of diseases from using tobacco products such as cancer, COPD and heart disease.     I advised him to quit and he is willing to quit. We have discussed the following method/s for tobacco cessation:  Counseling.  Together we have set a quit date for undetermined .  He will follow up with me in 1 month or sooner to check on his progress.    I spent 3  minutes counseling the patient.     #3 obesity: Class 2 Severe Obesity (BMI >=35 and <=39.9). Obesity-related health conditions include the following: hypertension and coronary heart disease. Obesity is improving with lifestyle modifications. BMI is  is above average; BMI management plan is completed. We discussed portion control and increasing exercise.    #4 HTN: chronic, controlled, conitnue on current medicaitons           This document has been electronically signed by Barbara Waters MD on November 30, 2022 07:56 CST

## 2022-12-12 PROCEDURE — G2066 INTER DEVC REMOTE 30D: HCPCS | Performed by: INTERNAL MEDICINE

## 2022-12-12 PROCEDURE — 93298 REM INTERROG DEV EVAL SCRMS: CPT | Performed by: INTERNAL MEDICINE

## 2022-12-29 ENCOUNTER — OFFICE VISIT (OUTPATIENT)
Dept: FAMILY MEDICINE CLINIC | Facility: CLINIC | Age: 52
End: 2022-12-29

## 2022-12-29 VITALS
BODY MASS INDEX: 37.45 KG/M2 | HEART RATE: 88 BPM | OXYGEN SATURATION: 99 % | TEMPERATURE: 98 F | SYSTOLIC BLOOD PRESSURE: 128 MMHG | DIASTOLIC BLOOD PRESSURE: 82 MMHG | RESPIRATION RATE: 20 BRPM | HEIGHT: 70 IN | WEIGHT: 261.6 LBS

## 2022-12-29 DIAGNOSIS — G89.29 CHRONIC PAIN OF RIGHT KNEE: Primary | ICD-10-CM

## 2022-12-29 DIAGNOSIS — M25.561 CHRONIC PAIN OF RIGHT KNEE: Primary | ICD-10-CM

## 2022-12-29 DIAGNOSIS — E78.2 MIXED HYPERLIPIDEMIA: ICD-10-CM

## 2022-12-29 DIAGNOSIS — I10 ESSENTIAL HYPERTENSION: ICD-10-CM

## 2022-12-29 DIAGNOSIS — Z79.4 TYPE 2 DIABETES MELLITUS WITH HYPERGLYCEMIA, WITH LONG-TERM CURRENT USE OF INSULIN: ICD-10-CM

## 2022-12-29 DIAGNOSIS — G89.29 CHRONIC BILATERAL LOW BACK PAIN, UNSPECIFIED WHETHER SCIATICA PRESENT: ICD-10-CM

## 2022-12-29 DIAGNOSIS — M54.50 CHRONIC BILATERAL LOW BACK PAIN, UNSPECIFIED WHETHER SCIATICA PRESENT: ICD-10-CM

## 2022-12-29 DIAGNOSIS — E66.01 CLASS 2 SEVERE OBESITY DUE TO EXCESS CALORIES WITH SERIOUS COMORBIDITY AND BODY MASS INDEX (BMI) OF 37.0 TO 37.9 IN ADULT: ICD-10-CM

## 2022-12-29 DIAGNOSIS — E11.65 TYPE 2 DIABETES MELLITUS WITH HYPERGLYCEMIA, WITH LONG-TERM CURRENT USE OF INSULIN: ICD-10-CM

## 2022-12-29 LAB
EXPIRATION DATE: ABNORMAL
HBA1C MFR BLD: 15 %
Lab: ABNORMAL

## 2022-12-29 PROCEDURE — 99214 OFFICE O/P EST MOD 30 MIN: CPT | Performed by: FAMILY MEDICINE

## 2022-12-29 PROCEDURE — 3046F HEMOGLOBIN A1C LEVEL >9.0%: CPT | Performed by: FAMILY MEDICINE

## 2022-12-29 PROCEDURE — 83036 HEMOGLOBIN GLYCOSYLATED A1C: CPT | Performed by: FAMILY MEDICINE

## 2022-12-29 RX ORDER — BLOOD-GLUCOSE METER
1 KIT MISCELLANEOUS DAILY
Qty: 1 EACH | Refills: 0 | Status: SHIPPED | OUTPATIENT
Start: 2022-12-29

## 2022-12-29 RX ORDER — INSULIN ASPART 100 [IU]/ML
10 INJECTION, SOLUTION INTRAVENOUS; SUBCUTANEOUS
Qty: 9 ML | Refills: 2 | Status: SHIPPED | OUTPATIENT
Start: 2022-12-29

## 2022-12-29 RX ORDER — HYDROCODONE BITARTRATE AND ACETAMINOPHEN 7.5; 325 MG/1; MG/1
1 TABLET ORAL EVERY 6 HOURS PRN
Qty: 120 TABLET | Refills: 0 | Status: SHIPPED | OUTPATIENT
Start: 2022-12-29 | End: 2023-01-31 | Stop reason: SDUPTHER

## 2022-12-29 RX ORDER — LANCETS 28 GAUGE
1 EACH MISCELLANEOUS 4 TIMES DAILY
Qty: 100 EACH | Refills: 12 | Status: SHIPPED | OUTPATIENT
Start: 2022-12-29

## 2022-12-29 NOTE — PROGRESS NOTES
Subjective  cc: pain   Roscoe Frias is a 52 y.o. male.     History of Present Illness     The patient is a 52-year-old white male with CAD, obesity, hyperlipidemia, and hypertension who presents for follow-up on his chronic right knee pain. Patient was initially seen in office with complaints of chronic right knee pain. He was interested in having a right knee replacement. However, he was found to have CAD and required stent placement. He is following with cardiology and he is compliant with his cardiac medications. However, he is currently in a 12-month waiting period until he can move forward with knee surgery. Patient reports his right knee pain continues to get worse. He is using his pain medication 4 times daily which does help. However, when he runs out of medication, the pain is significant. Patient is offered a referral to pain management today, however, he declines. Patient's blood pressure is currently well controlled. Patient continues to take Crestor. Reviewed most recent lipid panel. Patient does inquire about his glucose. He was seen in the emergency department in 11/2022 at which time they did blood testing. Patient was noted to have a hemoglobin A1c of 8.3 in 07/2022. His most recent ER visits revealed glucose ranging in 300 to 400s. Patient had a hemoglobin A1c done while in office today which is significantly elevated at greater than 15. Patient was unaware that he is a diabetic and is not currently receiving any diabetes medication. However, he does report that his wife is a diabetic and he is familiar with the disease. Patient reports he has been making changes to his diet and lifestyle, eating mostly meat and potatoes. However, he does drink a lot of sugary drinks. He has been consistently losing weight.      The following portions of the patient's history were reviewed and updated as appropriate: allergies, current medications, past family history, past medical history, past social history,  "past surgical history and problem list.        Review of Systems    Objective   Blood pressure 128/82, pulse 88, temperature 98 °F (36.7 °C), temperature source Oral, resp. rate 20, height 177.8 cm (70\"), weight 119 kg (261 lb 9.6 oz), SpO2 99 %.  Physical Exam  Vitals and nursing note reviewed.   Constitutional:       General: He is not in acute distress.     Appearance: He is well-developed. He is obese. He is not diaphoretic.   HENT:      Head: Normocephalic and atraumatic.      Right Ear: External ear normal.      Left Ear: External ear normal.      Nose: Nose normal.   Eyes:      General:         Right eye: No discharge.         Left eye: No discharge.      Conjunctiva/sclera: Conjunctivae normal.   Neck:      Thyroid: No thyromegaly.      Trachea: No tracheal deviation.   Cardiovascular:      Rate and Rhythm: Normal rate and regular rhythm.      Pulses: Normal pulses.   Pulmonary:      Effort: Pulmonary effort is normal. No respiratory distress.      Breath sounds: Normal breath sounds. No stridor. No wheezing.   Chest:      Chest wall: No tenderness.   Abdominal:      General: There is no distension.      Palpations: Abdomen is soft.      Tenderness: There is no abdominal tenderness.   Musculoskeletal:         General: Swelling and tenderness present.      Cervical back: Normal range of motion.   Lymphadenopathy:      Cervical: No cervical adenopathy.   Skin:     General: Skin is warm and dry.   Neurological:      Mental Status: He is alert and oriented to person, place, and time.      Motor: No abnormal muscle tone.      Coordination: Coordination normal.      Gait: Gait abnormal.   Psychiatric:         Behavior: Behavior normal.         Thought Content: Thought content normal.         Judgment: Judgment normal.         Assessment & Plan   Problems Addressed this Visit        Cardiac and Vasculature    Essential hypertension    Mixed hyperlipidemia       Endocrine and Metabolic    Class 2 severe obesity due " to excess calories with serious comorbidity and body mass index (BMI) of 39.0 to 39.9 in adult ([facility])    Type 2 diabetes mellitus with hyperglycemia, with long-term current use of insulin ([facility])    Relevant Medications    Insulin Glargine (LANTUS SOLOSTAR) 100 UNIT/ML injection pen    insulin aspart (NovoLOG FlexPen) 100 UNIT/ML solution pen-injector sc pen    glucose monitor monitoring kit    glucose blood test strip    Lancets (freestyle) lancets    Continuous Blood Gluc Sensor    Continuous Blood Gluc Transmit misc    Continuous Blood Gluc  device    Other Relevant Orders    POC Glycosylated Hemoglobin (Hb A1C) (Completed)       Musculoskeletal and Injuries    Chronic pain of right knee - Primary    Relevant Medications    HYDROcodone-acetaminophen (NORCO) 7.5-325 MG per tablet    Chronic bilateral low back pain   Diagnoses       Codes Comments    Chronic pain of right knee    -  Primary ICD-10-CM: M25.561, G89.29  ICD-9-CM: 719.46, 338.29     Chronic bilateral low back pain, unspecified whether sciatica present     ICD-10-CM: M54.50, G89.29  ICD-9-CM: 724.2, 338.29     Essential hypertension     ICD-10-CM: I10  ICD-9-CM: 401.9     Type 2 diabetes mellitus with hyperglycemia, with long-term current use of insulin ([facility])     ICD-10-CM: E11.65, Z79.4  ICD-9-CM: 250.00, 790.29, V58.67     Mixed hyperlipidemia     ICD-10-CM: E78.2  ICD-9-CM: 272.2     Class 2 severe obesity due to excess calories with serious comorbidity and body mass index (BMI) of 37.0 to 37.9 in adult ([facility])     ICD-10-CM: E66.01, Z68.37  ICD-9-CM: 278.01, V85.37         1. Hypertension: Chronic, controlled.  - Continue on current medication.    2. Hyperlipidemia: Chronic, stable.  - Continue on Crestor.  - Reviewed lipid panel.    3. Diabetes mellitus: Chronic, uncontrolled with hyperglycemia.  - Patient hemoglobin A1c has increased significantly.  - Discussed the diagnosis and treatment plan of diabetes with the patient.  - Recommend we  start him on insulin to help lower his blood sugar.  - We will start patient on Lantus 20 units at night as well as NovoLog 10 units t.i.d. with meals.  - Patient is given a prescription for a glucose monitor as well as a prescription for a continuous glucose monitor.  - I would prefer for patient to be started on the continuous monitor if insurance will pay for it that way he can monitor his glucose closely as well as see what dietary foods are driving his sugar up.  - Will see patient back in 1 month. If he is tolerating these medications, will consider adding Trulicity or Ozempic.    4. Chronic right knee pain: Chronic, stable.  - Continue on current medication.  - Controlled contact in the chart.  - CROW reviewed and appropriate.  - UDS up to date.  - Refill given on medication.  - Advised on risk and benefits of medication.  - Also advised that this is a short term prescription until patient can have knee surgery in a few months.  - Patient will continue to be seen monthly.      Transcribed from ambient dictation for Barbara Waters MD by Gege Delarosa.  12/29/22   09:16 CST    Patient or patient representative verbalized consent to the visit recording.  I have personally performed the services described in this document as transcribed by the above individual, and it is both accurate and complete.            This document has been electronically signed by Barbara Waters MD on December 31, 2022 14:12 CST

## 2022-12-30 ENCOUNTER — TELEPHONE (OUTPATIENT)
Dept: FAMILY MEDICINE CLINIC | Facility: CLINIC | Age: 52
End: 2022-12-30
Payer: COMMERCIAL

## 2023-01-04 ENCOUNTER — TELEPHONE (OUTPATIENT)
Dept: FAMILY MEDICINE CLINIC | Facility: CLINIC | Age: 53
End: 2023-01-04
Payer: COMMERCIAL

## 2023-01-25 DIAGNOSIS — I10 ESSENTIAL HYPERTENSION: ICD-10-CM

## 2023-01-26 RX ORDER — CLONIDINE HYDROCHLORIDE 0.1 MG/1
TABLET ORAL
Qty: 180 TABLET | Refills: 0 | Status: SHIPPED | OUTPATIENT
Start: 2023-01-26

## 2023-01-26 RX ORDER — LISINOPRIL 40 MG/1
TABLET ORAL
Qty: 180 TABLET | Refills: 0 | Status: SHIPPED | OUTPATIENT
Start: 2023-01-26

## 2023-01-26 NOTE — TELEPHONE ENCOUNTER
Rx Refill Note  Requested Prescriptions     Pending Prescriptions Disp Refills   • cloNIDine (CATAPRES) 0.1 MG tablet [Pharmacy Med Name: cloNIDine HCl 0.1 MG Oral Tablet] 180 tablet 0     Sig: Take 1 tablet by mouth twice daily   • lisinopril (PRINIVIL,ZESTRIL) 40 MG tablet [Pharmacy Med Name: Lisinopril 40 MG Oral Tablet] 180 tablet 0     Sig: Take 1 tablet by mouth twice daily      Last office visit with prescribing clinician: 12/29/2022   Next office visit with prescribing clinician: 1/31/2023     Hillary Hernandez MA  01/26/23, 08:46 CST

## 2023-01-30 DIAGNOSIS — M25.561 CHRONIC PAIN OF RIGHT KNEE: ICD-10-CM

## 2023-01-30 DIAGNOSIS — G89.29 CHRONIC PAIN OF RIGHT KNEE: ICD-10-CM

## 2023-01-30 RX ORDER — HYDROCODONE BITARTRATE AND ACETAMINOPHEN 7.5; 325 MG/1; MG/1
1 TABLET ORAL EVERY 6 HOURS PRN
Qty: 120 TABLET | Refills: 0 | Status: CANCELLED | OUTPATIENT
Start: 2023-01-30

## 2023-01-30 NOTE — TELEPHONE ENCOUNTER
Caller: EJKEVIN BURGESS     Relationship: [employer]SE     Best call back number:    [phone] (Mobile)         Requested Prescriptions:      HYDROcodone-acetaminophen (NORCO) 7.5-325 MG per tablet  1 tablet, Every 6 Hours PRN         Pharmacy where request should be sent:    [facility] [address] [place] [address] [phone]  - [phone]   [phone]  Additional details provided by patient: STATES SHE IS NOT SURE WHAT THE WEATHER WILL BE LIKE AND IF HE CAN GET TO HIS APPOINTMENT TOMORROW 01/31/23 SHE IS REQUESTING A REFILL     Does the patient have less than a 3 day supply:  [x] Yes  [] No    Would you like a call back once the refill request has been completed: [x] Yes [] No    If the office needs to give you a call back, can they leave a voicemail: [x] Yes [] No    Melvin Klein Rep   01/30/23 15:10 CST

## 2023-01-31 ENCOUNTER — TELEMEDICINE (OUTPATIENT)
Dept: FAMILY MEDICINE CLINIC | Facility: CLINIC | Age: 53
End: 2023-01-31
Payer: COMMERCIAL

## 2023-01-31 DIAGNOSIS — G89.29 CHRONIC PAIN OF RIGHT KNEE: ICD-10-CM

## 2023-01-31 DIAGNOSIS — M25.561 CHRONIC PAIN OF RIGHT KNEE: ICD-10-CM

## 2023-01-31 PROCEDURE — 99213 OFFICE O/P EST LOW 20 MIN: CPT | Performed by: FAMILY MEDICINE

## 2023-01-31 RX ORDER — HYDROCODONE BITARTRATE AND ACETAMINOPHEN 7.5; 325 MG/1; MG/1
1 TABLET ORAL EVERY 6 HOURS PRN
Qty: 120 TABLET | Refills: 0 | Status: SHIPPED | OUTPATIENT
Start: 2023-01-31 | End: 2023-03-01 | Stop reason: SDUPTHER

## 2023-02-27 ENCOUNTER — TELEMEDICINE (OUTPATIENT)
Dept: FAMILY MEDICINE CLINIC | Facility: CLINIC | Age: 53
End: 2023-02-27
Payer: COMMERCIAL

## 2023-02-27 DIAGNOSIS — E11.65 TYPE 2 DIABETES MELLITUS WITH HYPERGLYCEMIA, WITH LONG-TERM CURRENT USE OF INSULIN: ICD-10-CM

## 2023-02-27 DIAGNOSIS — I10 ESSENTIAL HYPERTENSION: ICD-10-CM

## 2023-02-27 DIAGNOSIS — M25.561 CHRONIC PAIN OF RIGHT KNEE: Primary | ICD-10-CM

## 2023-02-27 DIAGNOSIS — Z79.4 TYPE 2 DIABETES MELLITUS WITH HYPERGLYCEMIA, WITH LONG-TERM CURRENT USE OF INSULIN: ICD-10-CM

## 2023-02-27 DIAGNOSIS — G89.29 CHRONIC PAIN OF RIGHT KNEE: Primary | ICD-10-CM

## 2023-02-27 PROCEDURE — 99214 OFFICE O/P EST MOD 30 MIN: CPT | Performed by: FAMILY MEDICINE

## 2023-02-27 NOTE — PROGRESS NOTES
Subjective cc: chronic pain   Roscoe Frias is a 53 y.o. male who presents via [employer] video visit for follow up on his chronic knee pain.    He consents to video visit - he is at home, I am in office.     He did fall recently on his knee - it was swollen and he iced it, he cotninues to use his pain medication to allow him to manage his knee pain.     He has been monitoring his blood sugar - it has been improving - he is trying to take him insulin consistently but misses some nights.     BP is controlled as much as he can tell      History of Present Illness     The following portions of the patient's history were reviewed and updated as appropriate: allergies, current medications, past family history, past medical history, past social history, past surgical history and problem list.        Review of Systems    Objective   There were no vitals taken for this visit.  Physical Exam  Constitutional:       Appearance: Normal appearance.   Pulmonary:      Effort: Pulmonary effort is normal. No respiratory distress.   Neurological:      Mental Status: He is alert and oriented to person, place, and time.   Psychiatric:         Mood and Affect: Mood normal.         Behavior: Behavior normal.         Thought Content: Thought content normal.         Judgment: Judgment normal.         Assessment & Plan   Problems Addressed this Visit        Cardiac and Vasculature    Essential hypertension       Endocrine and Metabolic    Type 2 diabetes mellitus with hyperglycemia, with long-term current use of insulin (HCC)       Musculoskeletal and Injuries    Chronic pain of right knee - Primary   Diagnoses       Codes Comments    Chronic pain of right knee    -  Primary ICD-10-CM: M25.561, G89.29  ICD-9-CM: 719.46, 338.29     Type 2 diabetes mellitus with hyperglycemia, with long-term current use of insulin (HCC)     ICD-10-CM: E11.65, Z79.4  ICD-9-CM: 250.00, 790.29, V58.67     Essential hypertension     ICD-10-CM: I10  ICD-9-CM:  401.9           PLAN:     #1 HTN: chronic, controlled, continue on current medication     #2 DM: chronic, uncontrolled with hyperglycemia, encouraged to remain consistent with insulin injections, reviewed labs, will see pt back in person for next visit for repeat labs     #3 chronic right knee pain: chronic, stable, continue with current medication, roxana reviewed, controlled contract in chart, UDS UTD     15 minutes             This document has been electronically signed by Barbara Waters MD on February 27, 2023 13:29 CST

## 2023-03-01 DIAGNOSIS — G89.29 CHRONIC PAIN OF RIGHT KNEE: ICD-10-CM

## 2023-03-01 DIAGNOSIS — M25.561 CHRONIC PAIN OF RIGHT KNEE: ICD-10-CM

## 2023-03-01 RX ORDER — HYDROCODONE BITARTRATE AND ACETAMINOPHEN 7.5; 325 MG/1; MG/1
1 TABLET ORAL EVERY 6 HOURS PRN
Qty: 120 TABLET | Refills: 0 | Status: SHIPPED | OUTPATIENT
Start: 2023-03-01 | End: 2023-03-31 | Stop reason: SDUPTHER

## 2023-03-01 NOTE — TELEPHONE ENCOUNTER
Caller: JENIFER PAGAN    Relationship: Emergency Contact    Best call back number:  [phone]    Requested Prescriptions:   Requested Prescriptions     Pending Prescriptions Disp Refills   • HYDROcodone-acetaminophen (NORCO) 7.5-325 MG per tablet 120 tablet 0     Sig: Take 1 tablet by mouth Every 6 (Six) Hours As Needed for Moderate Pain.        Pharmacy where request should be sent: [address] [phone] [facility] [phone] [facility]     Additional details provided by patient: completely out     Does the patient have less than a 3 day supply:  [x] Yes  [] No    Would you like a call back once the refill request has been completed: [x] Yes [] No    If the office needs to give you a call back, can they leave a voicemail: [x] Yes [] No    Melvin Acosta Rep   03/01/23 09:45 CST

## 2023-03-01 NOTE — TELEPHONE ENCOUNTER
Rx Refill Note  Requested Prescriptions     Pending Prescriptions Disp Refills   • HYDROcodone-acetaminophen (NORCO) 7.5-325 MG per tablet 120 tablet 0     Sig: Take 1 tablet by mouth Every 6 (Six) Hours As Needed for Moderate Pain.      Last office visit with prescribing clinician: 2/27/2023    Next office visit with prescribing clinician: Visit date not found                         Would you like a call back once the refill request has been completed: [] Yes [] No    If the office needs to give you a call back, can they leave a voicemail: [] Yes [] No    Kylie Ardon MA  03/01/23, 11:58 CST

## 2023-03-31 ENCOUNTER — OFFICE VISIT (OUTPATIENT)
Dept: FAMILY MEDICINE CLINIC | Facility: CLINIC | Age: 53
End: 2023-03-31
Payer: COMMERCIAL

## 2023-03-31 VITALS
TEMPERATURE: 99.3 F | WEIGHT: 247.6 LBS | OXYGEN SATURATION: 95 % | SYSTOLIC BLOOD PRESSURE: 110 MMHG | HEART RATE: 89 BPM | HEIGHT: 71 IN | RESPIRATION RATE: 20 BRPM | BODY MASS INDEX: 34.66 KG/M2 | DIASTOLIC BLOOD PRESSURE: 78 MMHG

## 2023-03-31 DIAGNOSIS — G89.29 CHRONIC PAIN OF RIGHT KNEE: ICD-10-CM

## 2023-03-31 DIAGNOSIS — H65.193 ACUTE MEE (MIDDLE EAR EFFUSION), BILATERAL: Primary | ICD-10-CM

## 2023-03-31 DIAGNOSIS — H61.23 EXCESSIVE CERUMEN IN BOTH EAR CANALS: ICD-10-CM

## 2023-03-31 DIAGNOSIS — M25.561 CHRONIC PAIN OF RIGHT KNEE: ICD-10-CM

## 2023-03-31 PROCEDURE — 1160F RVW MEDS BY RX/DR IN RCRD: CPT | Performed by: NURSE PRACTITIONER

## 2023-03-31 PROCEDURE — 3074F SYST BP LT 130 MM HG: CPT | Performed by: NURSE PRACTITIONER

## 2023-03-31 PROCEDURE — 1159F MED LIST DOCD IN RCRD: CPT | Performed by: NURSE PRACTITIONER

## 2023-03-31 PROCEDURE — 99213 OFFICE O/P EST LOW 20 MIN: CPT | Performed by: NURSE PRACTITIONER

## 2023-03-31 PROCEDURE — 3078F DIAST BP <80 MM HG: CPT | Performed by: NURSE PRACTITIONER

## 2023-03-31 RX ORDER — HYDROCODONE BITARTRATE AND ACETAMINOPHEN 7.5; 325 MG/1; MG/1
1 TABLET ORAL EVERY 6 HOURS PRN
Qty: 120 TABLET | Refills: 0 | Status: SHIPPED | OUTPATIENT
Start: 2023-03-31

## 2023-03-31 RX ORDER — FLUTICASONE PROPIONATE 50 MCG
2 SPRAY, SUSPENSION (ML) NASAL DAILY
Qty: 15.8 ML | Refills: 0 | Status: SHIPPED | OUTPATIENT
Start: 2023-03-31

## 2023-03-31 RX ORDER — METHYLPREDNISOLONE 4 MG/1
TABLET ORAL
Qty: 21 TABLET | Refills: 0 | Status: SHIPPED | OUTPATIENT
Start: 2023-03-31

## 2023-03-31 RX ORDER — LORATADINE 10 MG/1
10 TABLET ORAL DAILY
Qty: 30 TABLET | Refills: 0 | Status: SHIPPED | OUTPATIENT
Start: 2023-03-31

## 2023-03-31 NOTE — PROGRESS NOTES
"Chief Complaint  Knee Pain (Follow up ) and Hypertension (Follow up )    Subjective        Roscoe Frias presents to [facility]  History of Present Illness   The patient,  1970, presents today for his 1-month follow-up on chronic right knee pain.    The patient needs a refill of hydrocodone 7.5-325, which he takes every 6 hours as needed for pain. He has been experiencing right knee pain more recently because he has been more active while helping his significant other who recently had her right lower extremity amputated. He has been the caregiver for her and, as a result, he has been doing more squatting and bending.     He also reports that he cannot hear out of his bilateral ears, which is sporadic. He feels like his ears are blocked with cerumen. His significant other has cleaned out his ears, but he still has that sensation sporadically of decreased hearing. He says he can tell when it is going to happen because he will experience tinnitus. He is not administering any type of allergy medication. The patient denies any type of discharge or injury to his ears.    Objective   Vital Signs:  /78 (BP Location: Right arm, Patient Position: Sitting, Cuff Size: Adult)   Pulse 89   Temp 99.3 °F (37.4 °C) (Infrared)   Resp 20   Ht 180.3 cm (71\")   Wt 112 kg (247 lb 9.6 oz)   SpO2 95%   BMI 34.53 kg/m²   Estimated body mass index is 34.53 kg/m² as calculated from the following:    Height as of this encounter: 180.3 cm (71\").    Weight as of this encounter: 112 kg (247 lb 9.6 oz).             Physical Exam  Constitutional:       Appearance: Normal appearance.   HENT:      Right Ear: A middle ear effusion is present. There is impacted cerumen.      Left Ear: A middle ear effusion is present. There is impacted cerumen.   Cardiovascular:      Pulses: Normal pulses.      Heart sounds: Normal heart sounds.   Pulmonary:      Effort: Pulmonary effort is normal. No " respiratory distress.      Breath sounds: Normal breath sounds.   Musculoskeletal:      Right knee: Bony tenderness present. Decreased range of motion. Tenderness present.   Neurological:      Mental Status: He is alert and oriented to person, place, and time.   Psychiatric:         Mood and Affect: Mood normal.         Behavior: Behavior normal.         Thought Content: Thought content normal.         Judgment: Judgment normal.        Result Review :                   Assessment and Plan   Diagnoses and all orders for this visit:    1. Acute LOUIS (middle ear effusion), bilateral (Primary)  -     methylPREDNISolone (MEDROL) 4 MG dose pack; Take as directed on package instructions.  Dispense: 21 tablet; Refill: 0  -     fluticasone (FLONASE) 50 MCG/ACT nasal spray; 2 sprays into the nostril(s) as directed by provider Daily.  Dispense: 15.8 mL; Refill: 0  -     loratadine (Claritin) 10 MG tablet; Take 1 tablet by mouth Daily.  Dispense: 30 tablet; Refill: 0    2. Chronic pain of right knee  -     HYDROcodone-acetaminophen (NORCO) 7.5-325 MG per tablet; Take 1 tablet by mouth Every 6 (Six) Hours As Needed for Moderate Pain.  Dispense: 120 tablet; Refill: 0    3. Excessive cerumen in both ear canals  -     carbamide peroxide (Debrox) 6.5 % otic solution; Administer 5 drops into both ears 2 (Two) Times a Day for 4 days.  Dispense: 22 mL; Refill: 0      [place], UDS, and controlled substance contract in emr. Advised patient of risks associated with controlled substances including physical and mental dependence, abuse, and mental impairment. Advised patient to use least amount of possible and never overuse or share medications with other people. Patient states understanding of risks and instructions on proper use.            Follow Up   Return in about 1 month (around 4/30/2023) for Recheck.  Patient was given instructions and counseling regarding his condition or for health maintenance advice. Please see specific  information pulled into the AVS if appropriate.

## 2023-04-27 DIAGNOSIS — I10 ESSENTIAL HYPERTENSION: ICD-10-CM

## 2023-04-27 RX ORDER — CLONIDINE HYDROCHLORIDE 0.1 MG/1
TABLET ORAL
Qty: 180 TABLET | Refills: 0 | Status: SHIPPED | OUTPATIENT
Start: 2023-04-27

## 2023-04-27 RX ORDER — LISINOPRIL 40 MG/1
TABLET ORAL
Qty: 180 TABLET | Refills: 0 | Status: SHIPPED | OUTPATIENT
Start: 2023-04-27

## 2023-04-27 NOTE — TELEPHONE ENCOUNTER
Rx Refill Note  Requested Prescriptions     Pending Prescriptions Disp Refills   • cloNIDine (CATAPRES) 0.1 MG tablet [Pharmacy Med Name: cloNIDine HCl 0.1 MG Oral Tablet] 180 tablet 0     Sig: Take 1 tablet by mouth twice daily   • lisinopril (PRINIVIL,ZESTRIL) 40 MG tablet [Pharmacy Med Name: Lisinopril 40 MG Oral Tablet] 180 tablet 0     Sig: Take 1 tablet by mouth twice daily      Last office visit with prescribing clinician: 3/31/2023   Next office visit with prescribing clinician: 4/28/2023    Hillary Hernandez MA  04/27/23, 14:09 CDT

## 2023-04-28 ENCOUNTER — OFFICE VISIT (OUTPATIENT)
Dept: FAMILY MEDICINE CLINIC | Facility: CLINIC | Age: 53
End: 2023-04-28
Payer: COMMERCIAL

## 2023-04-28 VITALS
SYSTOLIC BLOOD PRESSURE: 132 MMHG | HEIGHT: 71 IN | TEMPERATURE: 97.9 F | BODY MASS INDEX: 34.05 KG/M2 | OXYGEN SATURATION: 98 % | RESPIRATION RATE: 16 BRPM | HEART RATE: 77 BPM | DIASTOLIC BLOOD PRESSURE: 70 MMHG | WEIGHT: 243.2 LBS

## 2023-04-28 DIAGNOSIS — M25.561 CHRONIC PAIN OF RIGHT KNEE: ICD-10-CM

## 2023-04-28 DIAGNOSIS — Z79.4 TYPE 2 DIABETES MELLITUS WITH HYPERGLYCEMIA, WITH LONG-TERM CURRENT USE OF INSULIN: ICD-10-CM

## 2023-04-28 DIAGNOSIS — I10 ESSENTIAL HYPERTENSION: Primary | ICD-10-CM

## 2023-04-28 DIAGNOSIS — E66.09 CLASS 1 OBESITY DUE TO EXCESS CALORIES WITH SERIOUS COMORBIDITY AND BODY MASS INDEX (BMI) OF 33.0 TO 33.9 IN ADULT: ICD-10-CM

## 2023-04-28 DIAGNOSIS — E11.65 TYPE 2 DIABETES MELLITUS WITH HYPERGLYCEMIA, WITH LONG-TERM CURRENT USE OF INSULIN: ICD-10-CM

## 2023-04-28 DIAGNOSIS — G89.29 CHRONIC PAIN OF RIGHT KNEE: ICD-10-CM

## 2023-04-28 LAB
EXPIRATION DATE: ABNORMAL
HBA1C MFR BLD: 15 %
Lab: ABNORMAL

## 2023-04-28 RX ORDER — HYDROCODONE BITARTRATE AND ACETAMINOPHEN 7.5; 325 MG/1; MG/1
1 TABLET ORAL EVERY 6 HOURS PRN
Qty: 120 TABLET | Refills: 0 | Status: SHIPPED | OUTPATIENT
Start: 2023-04-28

## 2023-04-28 RX ORDER — INSULIN ASPART 100 [IU]/ML
10 INJECTION, SOLUTION INTRAVENOUS; SUBCUTANEOUS
Qty: 9 ML | Refills: 2 | Status: SHIPPED | OUTPATIENT
Start: 2023-04-28

## 2023-04-28 NOTE — PROGRESS NOTES
"Subjective cc: chronic pain   Roscoe Frias is a 53 y.o. male who presents for follow up on his chronic knee pain.  He continues to have pain and uses pain medication to help him function - interested in knee replacement when able   BP controlled   weihgt trending down   He has not been taking the insulin he was prescribed - blood glucose still very elevated      History of Present Illness     The following portions of the patient's history were reviewed and updated as appropriate: allergies, current medications, past family history, past medical history, past social history, past surgical history and problem list.        Review of Systems    Objective   Blood pressure 132/70, pulse 77, temperature 97.9 °F (36.6 °C), temperature source Oral, resp. rate 16, height 180.3 cm (71\"), weight 110 kg (243 lb 3.2 oz), SpO2 98 %.  Physical Exam  Vitals and nursing note reviewed.   Constitutional:       General: He is not in acute distress.     Appearance: He is well-developed. He is obese. He is not diaphoretic.   HENT:      Head: Normocephalic and atraumatic.      Right Ear: External ear normal.      Left Ear: External ear normal.      Nose: Nose normal.   Eyes:      General:         Right eye: No discharge.         Left eye: No discharge.      Conjunctiva/sclera: Conjunctivae normal.   Neck:      Thyroid: No thyromegaly.      Trachea: No tracheal deviation.   Cardiovascular:      Rate and Rhythm: Normal rate and regular rhythm.      Heart sounds: Normal heart sounds.   Pulmonary:      Effort: Pulmonary effort is normal. No respiratory distress.      Breath sounds: Normal breath sounds. No stridor. No wheezing.   Chest:      Chest wall: No tenderness.   Musculoskeletal:         General: Tenderness present.      Cervical back: Normal range of motion.   Lymphadenopathy:      Cervical: No cervical adenopathy.   Skin:     General: Skin is warm and dry.   Neurological:      Mental Status: He is alert and oriented to person, " place, and time.      Motor: No abnormal muscle tone.      Coordination: Coordination normal.      Gait: Gait abnormal.   Psychiatric:         Behavior: Behavior normal.         Thought Content: Thought content normal.         Judgment: Judgment normal.       Lab Results (most recent)     Procedure Component Value Units Date/Time    POC Glycosylated Hemoglobin (Hb A1C) [[id number]]  (Abnormal) Collected: 04/28/23 0936    Specimen: Blood Updated: 04/28/23 0937     Hemoglobin A1C 15 %      Comment: >15        Lot Number [id number]     Expiration Date 12/12/2024        Assessment & Plan   Problems Addressed this Visit        Cardiac and Vasculature    Essential hypertension - Primary       Endocrine and Metabolic    Type 2 diabetes mellitus with hyperglycemia, with long-term current use of insulin    Relevant Medications    Insulin Glargine (LANTUS SOLOSTAR) 100 UNIT/ML injection pen    insulin aspart (NovoLOG FlexPen) 100 UNIT/ML solution pen-injector sc pen    Continuous Blood Gluc Sensor    Continuous Blood Gluc Transmit misc    Continuous Blood Gluc  device    Other Relevant Orders    POC Glycosylated Hemoglobin (Hb A1C) (Completed)       Musculoskeletal and Injuries    Chronic pain of right knee    Relevant Medications    HYDROcodone-acetaminophen (NORCO) 7.5-325 MG per tablet   Other Visit Diagnoses     Class 1 obesity due to excess calories with serious comorbidity and body mass index (BMI) of 33.0 to 33.9 in adult          Diagnoses       Codes Comments    Essential hypertension    -  Primary ICD-10-CM: I10  ICD-9-CM: 401.9     Type 2 diabetes mellitus with hyperglycemia, with long-term current use of insulin     ICD-10-CM: E11.65, Z79.4  ICD-9-CM: 250.00, 790.29, V58.67     Chronic pain of right knee     ICD-10-CM: M25.561, G89.29  ICD-9-CM: 719.46, 338.29     Class 1 obesity due to excess calories with serious comorbidity and body mass index (BMI) of 33.0 to 33.9 in adult     ICD-10-CM: E66.09,  Z68.33  ICD-9-CM: 278.00, V85.33         PLAN:     #1 obesity: BMI is >= 30 and <35. (Class 1 Obesity). The following options were offered after discussion;: exercise counseling/recommendations and nutrition counseling/recommendations     #2 DM: chronic, uncontrolled with hyperglycemia, advised pt on his very elevated ha1c - advised he willnot be a candidate for ortho surg if ha1c is above 8 - he agrees to start on insulin - recheck in 1 month     #3 chronic right knee pain: chronic, uncontrolled, will cotninue on oral pain medication to allow him to function, roxana olivares, cc in chart, UDS U TD     #4 HTN: chronic, controlled, continue on current medicaiton           This document has been electronically signed by Barbara Waters MD on May 11, 2023 08:50 CDT

## 2023-05-19 ENCOUNTER — TELEPHONE (OUTPATIENT)
Dept: FAMILY MEDICINE CLINIC | Facility: CLINIC | Age: 53
End: 2023-05-19

## 2023-05-19 NOTE — TELEPHONE ENCOUNTER
Caller: JENIFER PAGAN    Relationship: Emergency Contact    Best call back number: [phone]    What medication are you requesting: FREE STYLE EDGAR GLUCOMETER AND TESTING SUPPLIES    What are your current symptoms: PATIENT IS HAVING ISSUES AND PAIN WITH DEXCOM       If a prescription is needed, what is your preferred pharmacy and phone number: [facility] [address] [place] [address] - [address] [place] [address] [phone] [facility] [phone]

## 2023-05-26 ENCOUNTER — TELEPHONE (OUTPATIENT)
Dept: FAMILY MEDICINE CLINIC | Facility: CLINIC | Age: 53
End: 2023-05-26
Payer: COMMERCIAL

## 2023-05-26 ENCOUNTER — OFFICE VISIT (OUTPATIENT)
Dept: FAMILY MEDICINE CLINIC | Facility: CLINIC | Age: 53
End: 2023-05-26
Payer: COMMERCIAL

## 2023-05-26 VITALS
HEIGHT: 71 IN | WEIGHT: 239 LBS | BODY MASS INDEX: 33.46 KG/M2 | TEMPERATURE: 98.4 F | RESPIRATION RATE: 18 BRPM | SYSTOLIC BLOOD PRESSURE: 139 MMHG | DIASTOLIC BLOOD PRESSURE: 86 MMHG | OXYGEN SATURATION: 99 % | HEART RATE: 82 BPM

## 2023-05-26 DIAGNOSIS — E78.2 MIXED HYPERLIPIDEMIA: ICD-10-CM

## 2023-05-26 DIAGNOSIS — Z79.4 TYPE 2 DIABETES MELLITUS WITH HYPERGLYCEMIA, WITH LONG-TERM CURRENT USE OF INSULIN: Primary | ICD-10-CM

## 2023-05-26 DIAGNOSIS — G89.29 CHRONIC PAIN OF RIGHT KNEE: ICD-10-CM

## 2023-05-26 DIAGNOSIS — I25.118 CORONARY ARTERY DISEASE OF NATIVE ARTERY OF NATIVE HEART WITH STABLE ANGINA PECTORIS: ICD-10-CM

## 2023-05-26 DIAGNOSIS — E11.65 TYPE 2 DIABETES MELLITUS WITH HYPERGLYCEMIA, WITH LONG-TERM CURRENT USE OF INSULIN: Primary | ICD-10-CM

## 2023-05-26 DIAGNOSIS — M25.561 CHRONIC PAIN OF RIGHT KNEE: ICD-10-CM

## 2023-05-26 DIAGNOSIS — I10 ESSENTIAL HYPERTENSION: ICD-10-CM

## 2023-05-26 PROCEDURE — 3079F DIAST BP 80-89 MM HG: CPT | Performed by: FAMILY MEDICINE

## 2023-05-26 PROCEDURE — 3046F HEMOGLOBIN A1C LEVEL >9.0%: CPT | Performed by: FAMILY MEDICINE

## 2023-05-26 PROCEDURE — 99214 OFFICE O/P EST MOD 30 MIN: CPT | Performed by: FAMILY MEDICINE

## 2023-05-26 PROCEDURE — 3075F SYST BP GE 130 - 139MM HG: CPT | Performed by: FAMILY MEDICINE

## 2023-05-26 RX ORDER — HYDROCODONE BITARTRATE AND ACETAMINOPHEN 7.5; 325 MG/1; MG/1
1 TABLET ORAL EVERY 6 HOURS PRN
Qty: 120 TABLET | Refills: 0 | Status: SHIPPED | OUTPATIENT
Start: 2023-05-26

## 2023-05-26 NOTE — PROGRESS NOTES
"Subjective cc: DM   Roscoe Frias is a 53 y.o. male.     History of Present Illness   The patient presents today for a follow-up. He is accompanied by an adult female.    The patient has been using pain medication to help ease his knee pain down. His goal is to still be able to have our knee surgery. Last month, we talked about his glucose levels and how he has to get it under control before they will do the knee surgery.    The patient reports his Dexcom machine is not working for him. He states it is a piece of junk. The patient reports whenever he puts it on, it hurts. He states it took his vision away and it is still hazy. He reports he has not had it in a week. He states he would rather prick his finger at least once a day before trying to use the Dexcom again. The patient reports he has been using the insulin injections every day for the past month , however it affects his severely affects his vision. The patient reports his vision goes black at times. The patient reports it gets worse and then it will come back. He states he has not seen an eye doctor for diabetes. The patient reports his vision was checked 2 years ago. He reports he is in the middle of a move and he cannot deal with the side effects of lowering his blood sugars. He states he will not do the insulin injections anymore. He states he is losing weight and his sugars have been running anywhere from 190 to off the scale. The patient reports his vision was fine last month until he started taking the insulin.    The following portions of the patient's history were reviewed and updated as appropriate: allergies, current medications, past family history, past medical history, past social history, past surgical history, and problem list.        Review of Systems    Objective   Blood pressure 139/86, pulse 82, temperature 98.4 °F (36.9 °C), temperature source Infrared, resp. rate 18, height 180.3 cm (71\"), weight 108 kg (239 lb), SpO2 99 %.  Physical " Exam  Vitals and nursing note reviewed.   Constitutional:       General: He is not in acute distress.     Appearance: He is well-developed. He is obese. He is not diaphoretic.   HENT:      Head: Normocephalic and atraumatic.      Right Ear: External ear normal.      Left Ear: External ear normal.      Nose: Nose normal.   Eyes:      General:         Right eye: No discharge.         Left eye: No discharge.      Conjunctiva/sclera: Conjunctivae normal.   Neck:      Thyroid: No thyromegaly.      Trachea: No tracheal deviation.   Cardiovascular:      Rate and Rhythm: Normal rate and regular rhythm.      Heart sounds: Normal heart sounds.   Pulmonary:      Effort: Pulmonary effort is normal. No respiratory distress.      Breath sounds: Normal breath sounds. No stridor. No wheezing.   Chest:      Chest wall: No tenderness.   Abdominal:      General: There is no distension.      Palpations: Abdomen is soft.      Tenderness: There is no abdominal tenderness.   Musculoskeletal:         General: Tenderness present.      Cervical back: Normal range of motion.   Lymphadenopathy:      Cervical: No cervical adenopathy.   Skin:     General: Skin is warm and dry.   Neurological:      Mental Status: He is alert and oriented to person, place, and time.      Motor: Weakness present. No abnormal muscle tone.      Coordination: Coordination normal.      Gait: Gait abnormal.   Psychiatric:         Behavior: Behavior normal.         Thought Content: Thought content normal.         Judgment: Judgment normal.       Assessment & Plan   Problems Addressed this Visit          Cardiac and Vasculature    Essential hypertension    Mixed hyperlipidemia    Relevant Orders    Lipid panel    Coronary artery disease of native artery of native heart with stable angina pectoris       Endocrine and Metabolic    Type 2 diabetes mellitus with hyperglycemia, with long-term current use of insulin - Primary    Relevant Orders    Microalbumin / Creatinine  Urine Ratio - Urine, Clean Catch    CBC No Differential    Comprehensive metabolic panel    Hemoglobin A1c    Ambulatory Referral for Diabetic Eye Exam-Optometry       Musculoskeletal and Injuries    Chronic pain of right knee    Relevant Medications    HYDROcodone-acetaminophen (NORCO) 7.5-325 MG per tablet     Diagnoses         Codes Comments    Type 2 diabetes mellitus with hyperglycemia, with long-term current use of insulin    -  Primary ICD-10-CM: E11.65, Z79.4  ICD-9-CM: 250.00, 790.29, V58.67     Chronic pain of right knee     ICD-10-CM: M25.561, G89.29  ICD-9-CM: 719.46, 338.29     Essential hypertension     ICD-10-CM: I10  ICD-9-CM: 401.9     Mixed hyperlipidemia     ICD-10-CM: E78.2  ICD-9-CM: 272.2     Coronary artery disease of native artery of native heart with stable angina pectoris     ICD-10-CM: I25.118  ICD-9-CM: 414.01, 413.9           1. Diabetes mellitus, chronic, uncontrolled with hyperglycemia:  - Patient has had significantly uncontrolled diabetes. Last month, we started him on insulin due to an A1c of 15. Patient does not like taking the insulin. He was also given a continuous glucose monitor. He does not like using the continuous glucose monitor. Encouraged patient to continue using insulin; however, we will decrease his nighttime dose from 20 to 10 units as he reports vision changes after taking the insulin at nighttime. Encouraged patient to bring his Dexcom into the office so that we can evaluate what is going on that is causing him pain with application. Referral to optometry for a dilated diabetic eye exam secondary to his uncontrolled diabetes and recent complaints in vision changes. Advised patient that some of these may be due to the decrease in his glucose with the insulin; however, patient reports still seeing numbers no less than 190 and up to an unreadable amount. Therefore, I do not suspect that patient is having any hypoglycemic episodes.    2. Chronic right knee pain,  chronic, stable:  - Continue on pain medication as prescribed. Refill given. Controlled contract in the chart. Advised patient that they will not consider doing an elective knee replacement until his hemoglobin A1c is less than 8.    3. CAD, chronic, stable:  - Continue with current medications.    4. Hyperlipidemia, chronic, stable:  - Continue on cholesterol medication. We will repeat lipid panel today.    5. Hypertension, chronic, controlled:  - Continue with current medication.    Transcribed from ambient dictation for Barbara Waters MD by Altagracia Lopes.  05/26/23   09:22 CDT    Patient or patient representative verbalized consent to the visit recording.  I have personally performed the services described in this document as transcribed by the above individual, and it is both accurate and complete.          This document has been electronically signed by Barbara Waters MD on June 6, 2023 14:08 CDT

## 2023-05-27 ENCOUNTER — HOSPITAL ENCOUNTER (EMERGENCY)
Facility: HOSPITAL | Age: 53
Discharge: HOME | End: 2023-05-27
Attending: FAMILY MEDICINE
Payer: COMMERCIAL

## 2023-05-27 ENCOUNTER — APPOINTMENT (OUTPATIENT)
Dept: GENERAL RADIOLOGY | Facility: HOSPITAL | Age: 53
End: 2023-05-27
Payer: COMMERCIAL

## 2023-05-27 VITALS
OXYGEN SATURATION: 94 % | HEIGHT: 71 IN | WEIGHT: 238 LBS | RESPIRATION RATE: 18 BRPM | HEART RATE: 73 BPM | TEMPERATURE: 98 F | SYSTOLIC BLOOD PRESSURE: 120 MMHG | BODY MASS INDEX: 33.32 KG/M2 | DIASTOLIC BLOOD PRESSURE: 83 MMHG

## 2023-05-27 DIAGNOSIS — R07.9 CHEST PAIN, UNSPECIFIED TYPE: ICD-10-CM

## 2023-05-27 DIAGNOSIS — E11.65 HYPERGLYCEMIA DUE TO DIABETES MELLITUS: Primary | ICD-10-CM

## 2023-05-27 LAB
ALBUMIN SERPL-MCNC: 4.1 G/DL (ref 3.5–5.2)
ALBUMIN SERPL-MCNC: 4.4 G/DL (ref 3.5–5.2)
ALBUMIN/CREAT UR: 14 MG/G CREAT (ref 0–29)
ALBUMIN/GLOB SERPL: 1.2 G/DL
ALBUMIN/GLOB SERPL: 1.2 G/DL
ALP SERPL-CCNC: 103 U/L (ref 39–117)
ALP SERPL-CCNC: 89 U/L (ref 39–117)
ALT SERPL W P-5'-P-CCNC: 19 U/L (ref 1–41)
ALT SERPL-CCNC: 22 U/L (ref 1–41)
ANION GAP SERPL CALCULATED.3IONS-SCNC: 16 MMOL/L (ref 5–15)
AST SERPL-CCNC: 13 U/L (ref 1–40)
AST SERPL-CCNC: 14 U/L (ref 1–40)
BASOPHILS # BLD AUTO: 0.06 10*3/MM3 (ref 0–0.2)
BASOPHILS NFR BLD AUTO: 0.6 % (ref 0–1.5)
BILIRUB SERPL-MCNC: 0.5 MG/DL (ref 0–1.2)
BILIRUB SERPL-MCNC: 0.6 MG/DL (ref 0–1.2)
BUN SERPL-MCNC: 11 MG/DL (ref 6–20)
BUN SERPL-MCNC: 15 MG/DL (ref 6–20)
BUN/CREAT SERPL: 10.9 (ref 7–25)
BUN/CREAT SERPL: 17.9 (ref 7–25)
CALCIUM SERPL-MCNC: 9.8 MG/DL (ref 8.6–10.5)
CALCIUM SPEC-SCNC: 9.5 MG/DL (ref 8.6–10.5)
CHLORIDE SERPL-SCNC: 100 MMOL/L (ref 98–107)
CHLORIDE SERPL-SCNC: 101 MMOL/L (ref 98–107)
CHOLEST SERPL-MCNC: 115 MG/DL (ref 0–200)
CO2 SERPL-SCNC: 21 MMOL/L (ref 22–29)
CO2 SERPL-SCNC: 22.8 MMOL/L (ref 22–29)
CREAT SERPL-MCNC: 0.84 MG/DL (ref 0.76–1.27)
CREAT SERPL-MCNC: 1.01 MG/DL (ref 0.76–1.27)
CREAT UR-MCNC: 110.7 MG/DL
DEPRECATED RDW RBC AUTO: 39.8 FL (ref 37–54)
EGFRCR SERPLBLD CKD-EPI 2021: 104.3 ML/MIN/1.73
EGFRCR SERPLBLD CKD-EPI 2021: 88.9 ML/MIN/1.73
EOSINOPHIL # BLD AUTO: 0.05 10*3/MM3 (ref 0–0.4)
EOSINOPHIL NFR BLD AUTO: 0.5 % (ref 0.3–6.2)
ERYTHROCYTE [DISTWIDTH] IN BLOOD BY AUTOMATED COUNT: 12.4 % (ref 12.3–15.4)
ERYTHROCYTE [DISTWIDTH] IN BLOOD BY AUTOMATED COUNT: 12.6 % (ref 12.3–15.4)
GEN 5 2HR TROPONIN T REFLEX: 8 NG/L
GLOBULIN SER CALC-MCNC: 3.3 GM/DL
GLOBULIN UR ELPH-MCNC: 3.7 GM/DL
GLUCOSE BLDC GLUCOMTR-MCNC: 280 MG/DL (ref 70–130)
GLUCOSE BLDC GLUCOMTR-MCNC: 456 MG/DL (ref 70–130)
GLUCOSE SERPL-MCNC: 338 MG/DL (ref 65–99)
GLUCOSE SERPL-MCNC: 544 MG/DL (ref 65–99)
HBA1C MFR BLD: 16 % (ref 4.8–5.6)
HCT VFR BLD AUTO: 54.5 % (ref 37.5–51)
HCT VFR BLD AUTO: 55.4 % (ref 37.5–51)
HDLC SERPL-MCNC: 31 MG/DL (ref 40–60)
HGB BLD-MCNC: 17.9 G/DL (ref 13–17.7)
HGB BLD-MCNC: 18.3 G/DL (ref 13–17.7)
HOLD SPECIMEN: NORMAL
HOLD SPECIMEN: NORMAL
IMM GRANULOCYTES # BLD AUTO: 0.03 10*3/MM3 (ref 0–0.05)
IMM GRANULOCYTES NFR BLD AUTO: 0.3 % (ref 0–0.5)
LDLC SERPL CALC-MCNC: 62 MG/DL (ref 0–100)
LYMPHOCYTES # BLD AUTO: 2.76 10*3/MM3 (ref 0.7–3.1)
LYMPHOCYTES NFR BLD AUTO: 29.7 % (ref 19.6–45.3)
MCH RBC QN AUTO: 28.6 PG (ref 26.6–33)
MCH RBC QN AUTO: 29.4 PG (ref 26.6–33)
MCHC RBC AUTO-ENTMCNC: 32.8 G/DL (ref 31.5–35.7)
MCHC RBC AUTO-ENTMCNC: 33 G/DL (ref 31.5–35.7)
MCV RBC AUTO: 87.1 FL (ref 79–97)
MCV RBC AUTO: 88.9 FL (ref 79–97)
MICROALBUMIN UR-MCNC: 15 UG/ML
MONOCYTES # BLD AUTO: 0.75 10*3/MM3 (ref 0.1–0.9)
MONOCYTES NFR BLD AUTO: 8.1 % (ref 5–12)
NEUTROPHILS NFR BLD AUTO: 5.63 10*3/MM3 (ref 1.7–7)
NEUTROPHILS NFR BLD AUTO: 60.8 % (ref 42.7–76)
NRBC BLD AUTO-RTO: 0 /100 WBC (ref 0–0.2)
PLATELET # BLD AUTO: 249 10*3/MM3 (ref 140–450)
PLATELET # BLD AUTO: 258 10*3/MM3 (ref 140–450)
PMV BLD AUTO: 10.5 FL (ref 6–12)
POTASSIUM SERPL-SCNC: 4.1 MMOL/L (ref 3.5–5.2)
POTASSIUM SERPL-SCNC: 4.2 MMOL/L (ref 3.5–5.2)
PROT SERPL-MCNC: 7.4 G/DL (ref 6–8.5)
PROT SERPL-MCNC: 8.1 G/DL (ref 6–8.5)
QT INTERVAL: 386 MS
QTC INTERVAL: 459 MS
RBC # BLD AUTO: 6.23 10*6/MM3 (ref 4.14–5.8)
RBC # BLD AUTO: 6.26 10*6/MM3 (ref 4.14–5.8)
SODIUM SERPL-SCNC: 137 MMOL/L (ref 136–145)
SODIUM SERPL-SCNC: 137 MMOL/L (ref 136–145)
TRIGL SERPL-MCNC: 118 MG/DL (ref 0–150)
TROPONIN T DELTA: 0 NG/L
TROPONIN T SERPL HS-MCNC: 8 NG/L
VLDLC SERPL CALC-MCNC: 22 MG/DL (ref 5–40)
WBC # BLD AUTO: 8.35 10*3/MM3 (ref 3.4–10.8)
WBC NRBC COR # BLD: 9.28 10*3/MM3 (ref 3.4–10.8)
WHOLE BLOOD HOLD COAG: NORMAL
WHOLE BLOOD HOLD SPECIMEN: NORMAL

## 2023-05-27 PROCEDURE — 93005 ELECTROCARDIOGRAM TRACING: CPT | Performed by: FAMILY MEDICINE

## 2023-05-27 PROCEDURE — 71045 X-RAY EXAM CHEST 1 VIEW: CPT

## 2023-05-27 PROCEDURE — 63710000001 INSULIN REGULAR HUMAN PER 5 UNITS: Performed by: FAMILY MEDICINE

## 2023-05-27 PROCEDURE — 84484 ASSAY OF TROPONIN QUANT: CPT

## 2023-05-27 PROCEDURE — 82948 REAGENT STRIP/BLOOD GLUCOSE: CPT

## 2023-05-27 PROCEDURE — 36415 COLL VENOUS BLD VENIPUNCTURE: CPT

## 2023-05-27 PROCEDURE — 99284 EMERGENCY DEPT VISIT MOD MDM: CPT

## 2023-05-27 PROCEDURE — 85025 COMPLETE CBC W/AUTO DIFF WBC: CPT

## 2023-05-27 PROCEDURE — 80053 COMPREHEN METABOLIC PANEL: CPT

## 2023-05-27 PROCEDURE — 84484 ASSAY OF TROPONIN QUANT: CPT | Performed by: FAMILY MEDICINE

## 2023-05-27 RX ORDER — ASPIRIN 81 MG/1
324 TABLET, CHEWABLE ORAL ONCE
Status: COMPLETED | OUTPATIENT
Start: 2023-05-27 | End: 2023-05-27

## 2023-05-27 RX ORDER — SODIUM CHLORIDE 0.9 % (FLUSH) 0.9 %
10 SYRINGE (ML) INJECTION AS NEEDED
Status: DISCONTINUED | OUTPATIENT
Start: 2023-05-27 | End: 2023-05-27 | Stop reason: HOSPADM

## 2023-05-27 RX ORDER — HYDROCODONE BITARTRATE AND ACETAMINOPHEN 7.5; 325 MG/1; MG/1
1 TABLET ORAL ONCE
Status: COMPLETED | OUTPATIENT
Start: 2023-05-27 | End: 2023-05-27

## 2023-05-27 RX ADMIN — HYDROCODONE BITARTRATE AND ACETAMINOPHEN 1 TABLET: 7.5; 325 TABLET ORAL at 20:03

## 2023-05-27 RX ADMIN — SODIUM CHLORIDE 1000 ML: 9 INJECTION, SOLUTION INTRAVENOUS at 18:37

## 2023-05-27 RX ADMIN — INSULIN HUMAN 12 UNITS: 100 INJECTION, SOLUTION PARENTERAL at 18:02

## 2023-05-27 RX ADMIN — ASPIRIN 324 MG: 81 TABLET, CHEWABLE ORAL at 18:04

## 2023-05-27 NOTE — ED PROVIDER NOTES
Subjective   History of Present Illness  53-year-old male comes emergency room with complaints of chest pain.  Patient states that for the last couple days he has been having some left-sided chest pain.  Patient states that it is a sharp pain in his chest.  Patient has no shortness of breath.  Patient has no fevers or chills.  Patient has no cough.  Patient no radiation of the pain.  Patient denies any alleviating or aggravating factors.  Patient states that he has been achy all over his entire body as well.  Patient states that he does have a history of diabetes.  Patient states that he does not check his sugars at home.  Patient states that he does drink a lot of soda at home.  Patient denies any other symptoms at this time.    Review of Systems   Cardiovascular:  Positive for chest pain.   Musculoskeletal:  Positive for myalgias.   All other systems reviewed and are negative.    Past Medical History:   Diagnosis Date    Hypertension     Stroke        Allergies   Allergen Reactions    Hctz [Hydrochlorothiazide] Other (See Comments)     Makes angry      Magnesium-Containing Compounds     Toradol [Ketorolac Tromethamine]     Tramadol        Past Surgical History:   Procedure Laterality Date    BACK SURGERY      CARDIAC CATHETERIZATION N/A 7/12/2022    Procedure: Cardiac Catheterization/Vascular Study DAYSI OK  He will wait for knee replacement for 1 year;  Surgeon: Kevin Garcia MD;  Location:  [facility];  Service: Cardiology;  Laterality: N/A;    NECK SURGERY         Family History   Problem Relation Age of Onset    No Known Problems Mother     No Known Problems Father     No Known Problems Sister     No Known Problems Brother     No Known Problems Son     No Known Problems Daughter     No Known Problems Maternal Grandmother     No Known Problems Maternal Grandfather     No Known Problems Paternal Grandmother     No Known Problems Paternal Grandfather     No Known Problems Cousin     Rheum  arthritis Neg Hx     Osteoarthritis Neg Hx     Asthma Neg Hx     Diabetes Neg Hx     Heart failure Neg Hx     Hyperlipidemia Neg Hx     Hypertension Neg Hx     Migraines Neg Hx     Rashes / Skin problems Neg Hx     Seizures Neg Hx     Stroke Neg Hx     Thyroid disease Neg Hx        Social History     Socioeconomic History    Marital status: Single   Tobacco Use    Smoking status: Every Day     Packs/day: 0.50     Years: 35.00     Pack years: 17.50     Types: Cigarettes     Start date: 2015    Smokeless tobacco: Never   Vaping Use    Vaping Use: Never used   Substance and Sexual Activity    Alcohol use: No    Drug use: No    Sexual activity: Defer           Objective   Physical Exam  Vitals and nursing note reviewed.   Constitutional:       Appearance: He is well-developed.   HENT:      Head: Normocephalic and atraumatic.   Eyes:      Extraocular Movements: Extraocular movements intact.      Pupils: Pupils are equal, round, and reactive to light.   Cardiovascular:      Rate and Rhythm: Normal rate and regular rhythm.      Heart sounds: Normal heart sounds.   Pulmonary:      Effort: Pulmonary effort is normal.      Breath sounds: Normal breath sounds.   Skin:     General: Skin is warm and dry.   Neurological:      General: No focal deficit present.      Mental Status: He is alert and oriented to person, place, and time.   Psychiatric:         Mood and Affect: Mood normal.         Behavior: Behavior normal.       Procedures           ED Course  ED Course as of 05/27/23 2036   Sat May 27, 2023   1817 EKG rate 85  Normal sinus rhythm  No STEMI [RP]   2016 HEART Pathway for Early Discharge in Acute Chest Pain - MDCalc  Calculated on May 27 2023 9:16 PM  3 points -> HEART Pathway Score  Low risk -> 0.9-1.7% 30-day MACE Repeat troponin at 3 hours and if negative, discharge home with outpatient follow-up. [RP]      ED Course User Index  [RP] Harinder Mccann MD                                         Lab Results (last 24 hours)        Procedure Component Value Units Date/Time    CBC & Differential [[id number]]  (Abnormal) Collected: 05/27/23 1616    Specimen: Blood Updated: 05/27/23 1628    Narrative:      The following orders were created for panel order CBC & Differential.  Procedure                               Abnormality         Status                     ---------                               -----------         ------                     CBC Auto Differential[[id number]]        Abnormal            Final result                 Please view results for these tests on the individual orders.    Comprehensive Metabolic Panel [[id number]]  (Abnormal) Collected: 05/27/23 1616    Specimen: Blood Updated: 05/27/23 1705     Glucose 544 mg/dL      BUN 11 mg/dL      Creatinine 1.01 mg/dL      Sodium 137 mmol/L      Potassium 4.1 mmol/L      Chloride 100 mmol/L      CO2 21.0 mmol/L      Calcium 9.5 mg/dL      Total Protein 8.1 g/dL      Albumin 4.4 g/dL      ALT (SGPT) 19 U/L      AST (SGOT) 14 U/L      Alkaline Phosphatase 103 U/L      Total Bilirubin 0.6 mg/dL      Globulin 3.7 gm/dL      A/G Ratio 1.2 g/dL      BUN/Creatinine Ratio 10.9     Anion Gap 16.0 mmol/L      eGFR 88.9 mL/min/1.73     Narrative:      GFR Normal >60  Chronic Kidney Disease <60  Kidney Failure <15      High Sensitivity Troponin T [[id number]]  (Normal) Collected: 05/27/23 1616    Specimen: Blood Updated: 05/27/23 1645     HS Troponin T 8 ng/L     Narrative:      High Sensitive Troponin T Reference Range:  <10.0 ng/L- Negative Female for AMI  <15.0 ng/L- Negative Male for AMI  >=10 - Abnormal Female indicating possible myocardial injury.  >=15 - Abnormal Male indicating possible myocardial injury.   Clinicians would have to utilize clinical acumen, EKG, Troponin, and serial changes to determine if it is an Acute Myocardial Infarction or myocardial injury due to an underlying chronic condition.         CBC Auto Differential [[id number]]  (Abnormal) Collected: 05/27/23 1616     Specimen: Blood Updated: 05/27/23 1628     WBC 9.28 10*3/mm3      RBC 6.26 10*6/mm3      Hemoglobin 17.9 g/dL      Hematocrit 54.5 %      MCV 87.1 fL      MCH 28.6 pg      MCHC 32.8 g/dL      RDW 12.6 %      RDW-SD 39.8 fl      MPV 10.5 fL      Platelets 258 10*3/mm3      Neutrophil % 60.8 %      Lymphocyte % 29.7 %      Monocyte % 8.1 %      Eosinophil % 0.5 %      Basophil % 0.6 %      Immature Grans % 0.3 %      Neutrophils, Absolute 5.63 10*3/mm3      Lymphocytes, Absolute 2.76 10*3/mm3      Monocytes, Absolute 0.75 10*3/mm3      Eosinophils, Absolute 0.05 10*3/mm3      Basophils, Absolute 0.06 10*3/mm3      Immature Grans, Absolute 0.03 10*3/mm3      nRBC 0.0 /100 WBC     POC Glucose Once [[id number]]  (Abnormal) Collected: 05/27/23 1621    Specimen: Blood Updated: 05/27/23 1633     Glucose 456 mg/dL      Comment: : [id number] Edwin Alvarez ID: [id number]       High Sensitivity Troponin T 2Hr [[id number]]  (Normal) Collected: 05/27/23 1805    Specimen: Blood Updated: 05/27/23 1830     HS Troponin T 8 ng/L      Troponin T Delta 0 ng/L     Narrative:      High Sensitive Troponin T Reference Range:  <10.0 ng/L- Negative Female for AMI  <15.0 ng/L- Negative Male for AMI  >=10 - Abnormal Female indicating possible myocardial injury.  >=15 - Abnormal Male indicating possible myocardial injury.   Clinicians would have to utilize clinical acumen, EKG, Troponin, and serial changes to determine if it is an Acute Myocardial Infarction or myocardial injury due to an underlying chronic condition.         POC Glucose Once [[id number]]  (Abnormal) Collected: 05/27/23 2007    Specimen: Blood Updated: 05/27/23 2019     Glucose 280 mg/dL      Comment: : [email or web address] Beverly JustinMeter ID: [id number]               XR Chest 1 View   Final Result   1. No acute process identified.   This report was finalized on 05/27/2023 18:15 by Dr. Verna Hernandez MD.          Medications   sodium chloride 0.9 % flush 10 mL (has no  administration in time range)   aspirin chewable tablet 324 mg (324 mg Oral Given 5/27/23 1804)   insulin regular (humuLIN R,novoLIN R) injection 12 Units (12 Units Intravenous Given 5/27/23 1802)   sodium chloride 0.9 % bolus 1,000 mL (1,000 mL Intravenous New Bag 5/27/23 1837)   HYDROcodone-acetaminophen (NORCO) 7.5-325 MG per tablet 1 tablet (1 tablet Oral Given 5/27/23 2003)       Medical Decision Making  Patient's blood sugar did improve to 280.  Patient did not want to wait in the emergency room any longer for any improvement in his blood sugar.  Patient did have 2 negative troponins.  Patient does have a heart score of 3.  Patient does not appear to be in any acute coronary syndrome.    I had a discussion with the patient/family regarding diagnosis, diagnostic results, treatment plan, and medications.  The patient/family indicated understanding of these instructions.  I spent adequate time at the bedside prior to discharge necessary to discuss the aftercare instructions, giving patient education, providing explanations of the results of our evaluations/findings, and my decision making to assure that the patient/family understand the plan of care.  Time was allotted to answer questions at that time and throughout the ED course.  Emphasis was placed on timely follow-up after discharge.  I also discussed the potential for the development of an acute emergent condition requiring further evaluation, return to the ER, admission, or even surgical intervention. I discussed that we found nothing during the visit today indicating the need for further ER workup at this time, admission to the hospital, or the presence of an acute unstable medical condition.  I encouraged the patient to return to the emergency department immediately for ANY concerns, worsening, new complaints, or if symptoms persist and unable to seek follow-up in a timely fashion.  The patient/family expressed understanding and agreement with this  plan.      Problems Addressed:  Chest pain, unspecified type: complicated acute illness or injury  Hyperglycemia due to diabetes mellitus: complicated acute illness or injury    Amount and/or Complexity of Data Reviewed  Labs: ordered. Decision-making details documented in ED Course.  Radiology: ordered. Decision-making details documented in ED Course.  ECG/medicine tests: ordered. Decision-making details documented in ED Course.    Risk  OTC drugs.  Prescription drug management.        Final diagnoses:   Hyperglycemia due to diabetes mellitus   Chest pain, unspecified type       ED Disposition  ED Disposition       ED Disposition   Discharge    Condition   Stable    Comment   --               Barbara Waters MD  [address] [address] [phone]    Schedule an appointment as soon as possible for a visit       [facility] Emergency Department  [address] [place] [id number] [phone]    As needed, If symptoms worsen         Medication List      No changes were made to your prescriptions during this visit.            Harinder Mccann MD  05/27/23 2034

## 2023-05-30 NOTE — TELEPHONE ENCOUNTER
Patient girlfriend reports that when patient takes the lantus insulin that it is painful to him. Reports that he doesn't like the Dexcom as it is painful as well. Girlfriend is also a diabetic and uses the dexcom and she has no issues with placement. Advised to discuss with PCP on 06/08/2023. Will call back if something is needed sooner.

## 2023-06-28 LAB
QT INTERVAL: 362 MS
QT INTERVAL: 386 MS
QTC INTERVAL: 430 MS
QTC INTERVAL: 459 MS

## 2023-07-17 NOTE — ED NOTES
PT REFUSED ABG .   Oxybutynin Counseling:  I discussed with the patient the risks of oxybutynin including but not limited to skin rash, drowsiness, dry mouth, difficulty urinating, and blurred vision.

## 2023-07-24 ENCOUNTER — OFFICE VISIT (OUTPATIENT)
Dept: FAMILY MEDICINE CLINIC | Facility: CLINIC | Age: 53
End: 2023-07-24
Payer: COMMERCIAL

## 2023-07-24 VITALS
HEART RATE: 70 BPM | OXYGEN SATURATION: 97 % | BODY MASS INDEX: 31.86 KG/M2 | DIASTOLIC BLOOD PRESSURE: 80 MMHG | TEMPERATURE: 98.4 F | WEIGHT: 227.6 LBS | HEIGHT: 71 IN | SYSTOLIC BLOOD PRESSURE: 108 MMHG

## 2023-07-24 DIAGNOSIS — E66.09 CLASS 1 OBESITY DUE TO EXCESS CALORIES WITH SERIOUS COMORBIDITY AND BODY MASS INDEX (BMI) OF 31.0 TO 31.9 IN ADULT: ICD-10-CM

## 2023-07-24 DIAGNOSIS — M25.561 CHRONIC PAIN OF RIGHT KNEE: ICD-10-CM

## 2023-07-24 DIAGNOSIS — E78.2 MIXED HYPERLIPIDEMIA: ICD-10-CM

## 2023-07-24 DIAGNOSIS — Z79.4 TYPE 2 DIABETES MELLITUS WITH HYPERGLYCEMIA, WITH LONG-TERM CURRENT USE OF INSULIN: ICD-10-CM

## 2023-07-24 DIAGNOSIS — I10 ESSENTIAL HYPERTENSION: Primary | ICD-10-CM

## 2023-07-24 DIAGNOSIS — G89.29 CHRONIC PAIN OF RIGHT KNEE: ICD-10-CM

## 2023-07-24 DIAGNOSIS — E11.65 TYPE 2 DIABETES MELLITUS WITH HYPERGLYCEMIA, WITH LONG-TERM CURRENT USE OF INSULIN: ICD-10-CM

## 2023-07-24 PROCEDURE — 3079F DIAST BP 80-89 MM HG: CPT | Performed by: FAMILY MEDICINE

## 2023-07-24 PROCEDURE — 3074F SYST BP LT 130 MM HG: CPT | Performed by: FAMILY MEDICINE

## 2023-07-24 PROCEDURE — 99214 OFFICE O/P EST MOD 30 MIN: CPT | Performed by: FAMILY MEDICINE

## 2023-07-24 PROCEDURE — 3046F HEMOGLOBIN A1C LEVEL >9.0%: CPT | Performed by: FAMILY MEDICINE

## 2023-07-24 RX ORDER — HYDROCODONE BITARTRATE AND ACETAMINOPHEN 7.5; 325 MG/1; MG/1
1 TABLET ORAL EVERY 6 HOURS PRN
Qty: 120 TABLET | Refills: 0 | Status: SHIPPED | OUTPATIENT
Start: 2023-07-24 | End: 2023-07-31 | Stop reason: SDUPTHER

## 2023-07-24 RX ORDER — DULAGLUTIDE 0.75 MG/.5ML
0.75 INJECTION, SOLUTION SUBCUTANEOUS
Qty: 2 ML | Refills: 2 | Status: SHIPPED | OUTPATIENT
Start: 2023-07-24 | End: 2023-08-02

## 2023-07-24 NOTE — PROGRESS NOTES
Subjective   Roscoe Frias is a 53 y.o. male.     History of Present Illness     The patient reports that his pain is uncontrolled in his right knee. He has been using his pain medication; however, he feels that it is no longer as effective as it used to be. It wears off after 1 or 2 hours of taking his medication. Therefore, he has been taking it more frequently and admittedly has been running out of medication. Discussed with patient that we can refer him to pain management for additional treatment options, however; he adamantly refuses to return to pain management. As he reports he did this in the past and does not wish to return. I discussed with the patient that I will continue writing his existing pain medication, but will not be increasing the dose and he verbalizes understanding.    The patient's blood pressure is well controlled while in office today.    The patient does have a history of CAD with stent placement in the LAD. He continues to be compliant with his medications. He does need to have follow-up with cardiology. He has a question about when his loop recorder would be removed. I advised the patient I did not know the answer to this and recommended he discuss with cardiology at his next visit.    The patient also has uncontrolled diabetes. His hemoglobin A1c 2 months ago was 16. The patient has been prescribed insulin; however, he reports it causes him to go blind and therefore he refuses to take the insulin. The patient has been referred to ophthalmology; however, has not been unable to see their office at this time. The patient is agreeable to trying alternative medications to further help improve his hemoglobin A1c. The patient has been making dietary changes to try to control his sugar through diet and lifestyle. He has been successful in losing weight over the past few months.      The following portions of the patient's history were reviewed and updated as appropriate: allergies, current  "medications, past family history, past medical history, past social history, past surgical history, and problem list.        Review of Systems    Objective   Blood pressure 108/80, pulse 70, temperature 98.4 øF (36.9 øC), height 180.3 cm (71\"), weight 103 kg (227 lb 9.6 oz), SpO2 97 %.  Physical Exam  Vitals and nursing note reviewed.   Constitutional:       Appearance: Normal appearance. He is well-developed.   Pulmonary:      Effort: Pulmonary effort is normal.      Breath sounds: Normal air entry.   Neurological:      Mental Status: He is alert and oriented to person, place, and time.      Motor: Motor function is intact.   Psychiatric:         Mood and Affect: Mood normal.         Behavior: Behavior normal.     Assessment & Plan   Problems Addressed this Visit          Cardiac and Vasculature    Essential hypertension - Primary    Mixed hyperlipidemia       Endocrine and Metabolic    Type 2 diabetes mellitus with hyperglycemia, with long-term current use of insulin       Musculoskeletal and Injuries    Chronic pain of right knee     Other Visit Diagnoses       Class 1 obesity due to excess calories with serious comorbidity and body mass index (BMI) of 31.0 to 31.9 in adult              Diagnoses         Codes Comments    Essential hypertension    -  Primary ICD-10-CM: I10  ICD-9-CM: 401.9     Mixed hyperlipidemia     ICD-10-CM: E78.2  ICD-9-CM: 272.2     Type 2 diabetes mellitus with hyperglycemia, with long-term current use of insulin     ICD-10-CM: E11.65, Z79.4  ICD-9-CM: 250.00, 790.29, V58.67     Chronic pain of right knee     ICD-10-CM: M25.561, G89.29  ICD-9-CM: 719.46, 338.29     Class 1 obesity due to excess calories with serious comorbidity and body mass index (BMI) of 31.0 to 31.9 in adult     ICD-10-CM: E66.09, Z68.31  ICD-9-CM: 278.00, V85.31           1. Hypertension: Chronic, controlled.  - Continue with current medication.    2. Hyperlipidemia: Chronic, stable.  - Continue with Crestor.    3. CAD " status post stent placement:  - Patient to continue with current medications.  - Keep follow-up with cardiology as scheduled.    4. Diabetes mellitus: Chronic, uncontrolled with hyperglycemia.  - Patient refuses to take insulin secondary to side effects.  - He is agreeable to trying Trulicity.  - Discussed risks and benefits of medication with patient and prescription given.  - Advised to continue with diet and lifestyle changes.    5. Obesity:  - BMI is >= 30 and <35. (Class 1 Obesity). The following options were offered after discussion;: exercise counseling/recommendations and nutrition counseling/recommendations     6. Chronic knee pain: Chronic, uncontrolled.  - Offered referral to pain management, however, patient declines.  - We will continue to write existing dose of pain medication.  - Suspect patient is developing a tolerance to the pain medication as he has been taking it for approximately 1 year.  - We will follow up in 1 month for reevaluation.    Transcribed from ambient dictation for Barbara Waters MD by Cheri Griffin.  07/24/23   12:33 CDT    Patient or patient representative verbalized consent to the visit recording.  I have personally performed the services described in this document as transcribed by the above individual, and it is both accurate and complete.          This document has been electronically signed by Barbara Waters MD on August 11, 2023 18:12 CDT

## 2023-07-25 ENCOUNTER — TELEPHONE (OUTPATIENT)
Dept: FAMILY MEDICINE CLINIC | Facility: CLINIC | Age: 53
End: 2023-07-25
Payer: COMMERCIAL

## 2023-07-26 ENCOUNTER — TELEPHONE (OUTPATIENT)
Dept: FAMILY MEDICINE CLINIC | Facility: CLINIC | Age: 53
End: 2023-07-26
Payer: COMMERCIAL

## 2023-07-26 NOTE — TELEPHONE ENCOUNTER
Called pt regarding referrals we have made. He cancelled his Cardiology referral and he did not show up for his ophthalmology appt.     Called his number and phone says the number is not working

## 2023-07-28 NOTE — TELEPHONE ENCOUNTER
Spoke to patiens significant other. She stated he still needs the referral to cardiology. She stated they would call to reschedule. She also stated that he did show for his eye exam, however he left because they wanted him to fill out paperwork and he did not want to do this.

## 2023-07-31 DIAGNOSIS — M25.561 CHRONIC PAIN OF RIGHT KNEE: ICD-10-CM

## 2023-07-31 DIAGNOSIS — G89.29 CHRONIC PAIN OF RIGHT KNEE: ICD-10-CM

## 2023-07-31 DIAGNOSIS — I10 ESSENTIAL HYPERTENSION: ICD-10-CM

## 2023-07-31 RX ORDER — CLONIDINE HYDROCHLORIDE 0.1 MG/1
TABLET ORAL
Qty: 180 TABLET | Refills: 0 | Status: SHIPPED | OUTPATIENT
Start: 2023-07-31

## 2023-07-31 RX ORDER — HYDROCODONE BITARTRATE AND ACETAMINOPHEN 7.5; 325 MG/1; MG/1
1 TABLET ORAL EVERY 6 HOURS PRN
Qty: 120 TABLET | Refills: 0 | Status: SHIPPED | OUTPATIENT
Start: 2023-07-31

## 2023-08-01 RX ORDER — TICAGRELOR 90 MG/1
TABLET ORAL
Qty: 180 TABLET | Refills: 0 | Status: SHIPPED | OUTPATIENT
Start: 2023-08-01

## 2023-08-01 RX ORDER — ROSUVASTATIN CALCIUM 20 MG/1
TABLET, COATED ORAL
Qty: 90 TABLET | Refills: 0 | Status: SHIPPED | OUTPATIENT
Start: 2023-08-01

## 2023-08-02 DIAGNOSIS — Z79.4 TYPE 2 DIABETES MELLITUS WITH HYPERGLYCEMIA, WITH LONG-TERM CURRENT USE OF INSULIN: Primary | ICD-10-CM

## 2023-08-02 DIAGNOSIS — E11.65 TYPE 2 DIABETES MELLITUS WITH HYPERGLYCEMIA, WITH LONG-TERM CURRENT USE OF INSULIN: Primary | ICD-10-CM

## 2023-08-02 RX ORDER — SEMAGLUTIDE 0.68 MG/ML
0.25 INJECTION, SOLUTION SUBCUTANEOUS WEEKLY
Qty: 3 ML | Refills: 0 | Status: SHIPPED | OUTPATIENT
Start: 2023-08-02

## 2023-08-03 ENCOUNTER — TELEPHONE (OUTPATIENT)
Dept: FAMILY MEDICINE CLINIC | Facility: CLINIC | Age: 53
End: 2023-08-03
Payer: COMMERCIAL

## 2023-08-03 DIAGNOSIS — I10 ESSENTIAL HYPERTENSION: ICD-10-CM

## 2023-08-04 RX ORDER — LISINOPRIL 40 MG/1
TABLET ORAL
Qty: 180 TABLET | Refills: 0 | Status: SHIPPED | OUTPATIENT
Start: 2023-08-04

## 2023-08-23 RX ORDER — HYDROGEN PEROXIDE 2.65 ML/100ML
LIQUID ORAL; TOPICAL
Qty: 30 TABLET | Refills: 0 | Status: SHIPPED | OUTPATIENT
Start: 2023-08-23

## 2023-08-29 ENCOUNTER — OFFICE VISIT (OUTPATIENT)
Dept: FAMILY MEDICINE CLINIC | Facility: CLINIC | Age: 53
End: 2023-08-29
Payer: COMMERCIAL

## 2023-08-29 VITALS
BODY MASS INDEX: 31.78 KG/M2 | HEIGHT: 71 IN | HEART RATE: 97 BPM | TEMPERATURE: 98.2 F | OXYGEN SATURATION: 99 % | WEIGHT: 227 LBS

## 2023-08-29 DIAGNOSIS — I10 ESSENTIAL HYPERTENSION: ICD-10-CM

## 2023-08-29 DIAGNOSIS — R50.9 FEVER, UNSPECIFIED FEVER CAUSE: ICD-10-CM

## 2023-08-29 DIAGNOSIS — R50.9 FEVER AND CHILLS: ICD-10-CM

## 2023-08-29 DIAGNOSIS — E66.01 MORBID (SEVERE) OBESITY DUE TO EXCESS CALORIES: ICD-10-CM

## 2023-08-29 DIAGNOSIS — E11.65 TYPE 2 DIABETES MELLITUS WITH HYPERGLYCEMIA, WITH LONG-TERM CURRENT USE OF INSULIN: Primary | ICD-10-CM

## 2023-08-29 DIAGNOSIS — M25.561 CHRONIC PAIN OF RIGHT KNEE: ICD-10-CM

## 2023-08-29 DIAGNOSIS — E78.2 MIXED HYPERLIPIDEMIA: ICD-10-CM

## 2023-08-29 DIAGNOSIS — Z79.4 TYPE 2 DIABETES MELLITUS WITH HYPERGLYCEMIA, WITH LONG-TERM CURRENT USE OF INSULIN: Primary | ICD-10-CM

## 2023-08-29 DIAGNOSIS — G89.29 CHRONIC PAIN OF RIGHT KNEE: ICD-10-CM

## 2023-08-29 LAB
EXPIRATION DATE: ABNORMAL
EXPIRATION DATE: NORMAL
FLUAV AG UPPER RESP QL IA.RAPID: NOT DETECTED
FLUBV AG UPPER RESP QL IA.RAPID: NOT DETECTED
HBA1C MFR BLD: 15 %
INTERNAL CONTROL: NORMAL
Lab: ABNORMAL
Lab: NORMAL
SARS-COV-2 AG UPPER RESP QL IA.RAPID: NOT DETECTED

## 2023-08-29 RX ORDER — HYDROCODONE BITARTRATE AND ACETAMINOPHEN 7.5; 325 MG/1; MG/1
1 TABLET ORAL EVERY 6 HOURS PRN
Qty: 120 TABLET | Refills: 0 | Status: SHIPPED | OUTPATIENT
Start: 2023-08-29

## 2023-08-29 RX ORDER — AZITHROMYCIN 250 MG/1
TABLET, FILM COATED ORAL
Qty: 6 TABLET | Refills: 0 | Status: SHIPPED | OUTPATIENT
Start: 2023-08-29

## 2023-08-29 NOTE — PROGRESS NOTES
"Subjective cc: DM   Roscoe Frias is a 53 y.o. male.     History of Present Illness     The patient presents today for fever, chills, and chest pain.    The patient has been feeling sick for the past 7 to 8 days. He has a headache, fever, and cold chills. He denies any trouble breathing. He believes he has COVID-19. He has had chest pain. He denies any nausea or vomiting. He has had diarrhea. He has not had a bowel movement in 3 days. He has not taken the Ozempic at all. His wife has had the same kind of symptoms, but hers is getting a lot better. His symptoms are staying the same. He had a half of an egg McMuffin this morning. He does not have an appetite. He has smoked a pack of cigarettes over the past 7 days. He was thinking about going to the emergency room last night.    The patient's chest pain is intermittent. It happens when he lays down but it does not hurt when he takes a deep breath. He has not been walking to the car. He denies any swelling in his legs. The erythema is going away.    The patient is still losing weight.    He continues to have knee pain     He has not been compliant with DM medications.     The following portions of the patient's history were reviewed and updated as appropriate: allergies, current medications, past family history, past medical history, past social history, past surgical history, and problem list.        Review of Systems    Objective   Pulse 97, temperature 98.2 øF (36.8 øC), temperature source Temporal, height 180.3 cm (71\"), weight 103 kg (227 lb), SpO2 99 %.  Physical Exam  Vitals and nursing note reviewed.   Constitutional:       Appearance: He is obese. He is ill-appearing. He is not toxic-appearing.   HENT:      Head: Normocephalic and atraumatic.      Right Ear: Tympanic membrane, ear canal and external ear normal.      Left Ear: Tympanic membrane, ear canal and external ear normal.      Nose: Nose normal.      Mouth/Throat:      Mouth: Mucous membranes are " moist.      Pharynx: No oropharyngeal exudate or posterior oropharyngeal erythema.   Eyes:      General:         Right eye: No discharge.         Left eye: No discharge.      Conjunctiva/sclera: Conjunctivae normal.   Cardiovascular:      Rate and Rhythm: Tachycardia present.      Pulses: Normal pulses.   Pulmonary:      Effort: Pulmonary effort is normal. No respiratory distress.      Breath sounds: Normal breath sounds. No wheezing.   Musculoskeletal:         General: Tenderness present.      Cervical back: Tenderness present. No rigidity.   Lymphadenopathy:      Cervical: No cervical adenopathy.   Skin:     General: Skin is warm and dry.   Neurological:      Mental Status: He is alert.      Motor: Weakness present.      Gait: Gait abnormal.   Psychiatric:         Mood and Affect: Mood normal.         Behavior: Behavior normal.         Thought Content: Thought content normal.         Judgment: Judgment normal.          Lab Results (last 24 hours)       Procedure Component Value Units Date/Time    POCT SARS-CoV-2 Antigen FROILAN + Flu [[id number]]  (Normal) Collected: 08/29/23 0925    Specimen: Swab Updated: 08/29/23 0925     SARS Antigen Not Detected     Influenza A Antigen FROILAN Not Detected     Influenza B Antigen FROILAN Not Detected     Internal Control Passed     Lot Number [id number]     Expiration Date 04/10/2024    POC Glycosylated Hemoglobin (Hb A1C) [[id number]]  (Abnormal) Collected: 08/29/23 0912    Specimen: Blood Updated: 08/29/23 0912     Hemoglobin A1C 15.0 %      Lot Number [id number]     Expiration Date 03/22/2025               Imaging Results (Last 24 Hours)       Procedure Component Value Units Date/Time    XR Chest PA & Lateral (In Office) [[id number]] Collected: 08/29/23 1026     Updated: 08/29/23 1032    Narrative:      EXAMINATION: XR CHEST PA AND LATERAL- 8/29/2023 10:26 AM CDT     HISTORY: SOA, chest pain; R50.9-Fever, unspecified.     REPORT: Frontal and lateral views of the chest were obtained.      COMPARISON: Chest x-ray 05/27/2023.     There is mild interstitial prominence which appears stable. No focal  pulmonary infiltrate or consolidation is identified. There is a vague  nodular density at the right base which may be related to summation of  normal lung markings and rib shadows. This is only seen on the frontal  view. Consider repeat chest x-rays 6 weeks. Heart size is normal. No  pneumothorax or pleural effusion is identified. An implanted cardiac  monitoring devices noted over the left chest as before. The osseous  structures are unremarkable.       Impression:      No acute cardiopulmonary abnormality. Repeat chest x-rays  are recommended in 6 weeks for vague nodular density at the right base,  which is probably related to summation artifact.  This report was finalized on 08/29/2023 10:29 by Dr. Uziel Marcus MD.              Assessment & Plan   Problems Addressed this Visit          Cardiac and Vasculature    Essential hypertension    Mixed hyperlipidemia       Endocrine and Metabolic    Type 2 diabetes mellitus with hyperglycemia, with long-term current use of insulin - Primary    Relevant Orders    POC Glycosylated Hemoglobin (Hb A1C) (Completed)       Musculoskeletal and Injuries    Chronic pain of right knee    Relevant Medications    HYDROcodone-acetaminophen (NORCO) 7.5-325 MG per tablet    Other Relevant Orders    ToxASSURE Select 13 (MW) - Urine, Clean Catch     Other Visit Diagnoses       Morbid (severe) obesity due to excess calories        Fever and chills        Relevant Medications    azithromycin (Zithromax Z-Caesar) 250 MG tablet    Other Relevant Orders    XR Chest PA & Lateral (In Office) (Completed)    Comprehensive metabolic panel    CBC No Differential    POCT SARS-CoV-2 Antigen FROILAN + Flu (Completed)    Fever, unspecified fever cause              Diagnoses         Codes Comments    Type 2 diabetes mellitus with hyperglycemia, with long-term current use of insulin    -  Primary  ICD-10-CM: E11.65, Z79.4  ICD-9-CM: 250.00, 790.29, V58.67     Morbid (severe) obesity due to excess calories     ICD-10-CM: E66.01  ICD-9-CM: 278.01     Fever and chills     ICD-10-CM: R50.9  ICD-9-CM: 780.60     Chronic pain of right knee     ICD-10-CM: M25.561, G89.29  ICD-9-CM: 719.46, 338.29     Fever, unspecified fever cause     ICD-10-CM: R50.9  ICD-9-CM: 780.60     Essential hypertension     ICD-10-CM: I10  ICD-9-CM: 401.9     Mixed hyperlipidemia     ICD-10-CM: E78.2  ICD-9-CM: 272.2           1. Fever and chills: New, needs further evaluation.   - Patient appears acutely ill while in office today.   - He has felt sick over the past 7 to 8 days.   - He feels like he is not getting any better.   - Family members have been sick at home.   - Will do COVID-19 and influenza testing while in office today.   - Will do a chest x-ray to rule out pneumonia.   - Will evaluate a CBC and CMP for further evaluation since patient reports he feels terrible.   - Will advise patient on risk factors and need to seek care in the emergency department if his symptoms were to suddenly worsen.   - Will treat based on results of testing.    2. Hypertension: Chronic, stable.   - Continue taking current medications.    3. Hyperlipidemia: Chronic, stable.   - Continue taking current medication.    4. Diabetes: Chronic, uncontrolled with hyperglycemia.   - Patient has a hemoglobin A1c of above 15.   - Patient declines to take insulin and he was prescribed Ozempic at his last appointment.   - However, he has not started the medication.   - Patient is noncompliant with his diabetic medications.   - Advised patient we have to do something to get his diabetes under better control.   - Patient notes when he is feeling better, he could try the Ozempic.   - Recommend diet and lifestyle changes.    5. Obesity: BMI is >= 30 and <35. (Class 1 Obesity). The following options were offered after discussion;: exercise counseling/recommendations and  nutrition counseling/recommendations     6. Chronic knee pain: Chronic, stable.   - Patient has chronic right knee pain.   - He is in need of a right knee replacement; however, orthopedics is not going to be willing to do his surgery while his hemoglobin A1c is significantly uncontrolled.   - I have discussed this with patient.   - Our agreement was to write a pain medication prescription for him over the course of 12 months until he would be eligible for elective surgery.   - Controlled contract in the chart.   - CROW reviewed and appropriate.   - Urine drug screen to be obtained today.   - Refill to be given on medication.   - Advised patient if we cannot start making progress with his diabetes, I will need to refer him to pain management for further evaluation of long term pain medication.    Transcribed from ambient dictation for Barbara Waters MD by Gege Delarosa.  08/29/23   09:54 CDT    Patient or patient representative verbalized consent to the visit recording.  I have personally performed the services described in this document as transcribed by the above individual, and it is both accurate and complete.         This document has been electronically signed by Barbara Waters MD on August 29, 2023 13:34 CDT

## 2023-08-30 ENCOUNTER — TELEPHONE (OUTPATIENT)
Dept: FAMILY MEDICINE CLINIC | Facility: CLINIC | Age: 53
End: 2023-08-30
Payer: COMMERCIAL

## 2023-08-30 ENCOUNTER — NURSE TRIAGE (OUTPATIENT)
Dept: CALL CENTER | Facility: HOSPITAL | Age: 53
End: 2023-08-30
Payer: COMMERCIAL

## 2023-08-30 NOTE — TELEPHONE ENCOUNTER
"Reason for Disposition   Lab or radiology calling with CRITICAL test results    Additional Information   Negative: Lab calling with strep throat test results and triager can call in prescription   Negative: Lab calling with urinalysis test results and triager can call in prescription   Negative: Medication questions   Negative: Medication renewal and refill questions   Negative: Pre-operative or pre-procedural questions   Negative: ED call to PCP (i.e., primary care provider; doctor, NP, or PA)   Negative: Doctor (or NP/PA) call to PCP   Negative: Call about patient who is currently hospitalized    Answer Assessment - Initial Assessment Questions  1. REASON FOR CALL or QUESTION: \"What is your reason for calling today?\" or \"How can I best  help you?\" or \"What question do you have that I can help answer?\"      Critical lab, glucose od 733, drawn 8/29 @0830  2. CALLER: Document the source of call. (e.g., laboratory, patient).      Tonia with [facility] lab    Protocols used: PCP Call - No Triage-ADULT-    "

## 2023-08-30 NOTE — TELEPHONE ENCOUNTER
Spoke with - recommend to call pat to notify.   Advised that pt needs to be seen at ED for tx. Advised that covid/flu testing yesterday was negative and xray was negative also. Explained to pt that glucose was significantly elevated at 733 and he will need IV fluids and to report to ED. Pt reports that he will go to [facility] ED.

## 2023-09-01 ENCOUNTER — TELEPHONE (OUTPATIENT)
Dept: FAMILY MEDICINE CLINIC | Facility: CLINIC | Age: 53
End: 2023-09-01
Payer: COMMERCIAL

## 2023-09-01 NOTE — TELEPHONE ENCOUNTER
Patients girlfriend who is on verbal called and wanted to go over results for him as he couldn't remember what the nurse told him while he was at his appointment the other day.

## 2023-09-01 NOTE — TELEPHONE ENCOUNTER
Called Amy back-  Verbal- explained that pt didn't go to ED. She reports that pt is not feeling any better. Explained that pt needs to go for tx, verbalized understanding and will take pt to [facility] ED.

## 2023-09-06 DIAGNOSIS — M25.561 CHRONIC PAIN OF RIGHT KNEE: Primary | ICD-10-CM

## 2023-09-06 DIAGNOSIS — G89.29 CHRONIC PAIN OF RIGHT KNEE: Primary | ICD-10-CM

## 2023-09-06 LAB
ALBUMIN SERPL-MCNC: 3.9 G/DL (ref 3.5–5.2)
ALBUMIN/GLOB SERPL: 1.2 G/DL
ALP SERPL-CCNC: 98 U/L (ref 39–117)
ALT SERPL-CCNC: 22 U/L (ref 1–41)
AST SERPL-CCNC: 25 U/L (ref 1–40)
BILIRUB SERPL-MCNC: 1 MG/DL (ref 0–1.2)
BUN SERPL-MCNC: 17 MG/DL (ref 6–20)
BUN/CREAT SERPL: 14.9 (ref 7–25)
CALCIUM SERPL-MCNC: 8.7 MG/DL (ref 8.6–10.5)
CHLORIDE SERPL-SCNC: 88 MMOL/L (ref 98–107)
CO2 SERPL-SCNC: 21.3 MMOL/L (ref 22–29)
CREAT SERPL-MCNC: 1.14 MG/DL (ref 0.76–1.27)
DRUGS UR: NORMAL
EGFRCR SERPLBLD CKD-EPI 2021: 76.9 ML/MIN/1.73
ERYTHROCYTE [DISTWIDTH] IN BLOOD BY AUTOMATED COUNT: 13.1 % (ref 12.3–15.4)
GLOBULIN SER CALC-MCNC: 3.2 GM/DL
GLUCOSE SERPL-MCNC: 733 MG/DL (ref 65–99)
HCT VFR BLD AUTO: 57.5 % (ref 37.5–51)
HGB BLD-MCNC: 18.6 G/DL (ref 13–17.7)
MCH RBC QN AUTO: 29.3 PG (ref 26.6–33)
MCHC RBC AUTO-ENTMCNC: 32.3 G/DL (ref 31.5–35.7)
MCV RBC AUTO: 90.6 FL (ref 79–97)
PLATELET # BLD AUTO: 217 10*3/MM3 (ref 140–450)
POTASSIUM SERPL-SCNC: 3.7 MMOL/L (ref 3.5–5.2)
PROT SERPL-MCNC: 7.1 G/DL (ref 6–8.5)
RBC # BLD AUTO: 6.35 10*6/MM3 (ref 4.14–5.8)
SODIUM SERPL-SCNC: 129 MMOL/L (ref 136–145)
WBC # BLD AUTO: 6.25 10*3/MM3 (ref 3.4–10.8)

## 2023-09-08 ENCOUNTER — TELEPHONE (OUTPATIENT)
Dept: FAMILY MEDICINE CLINIC | Facility: CLINIC | Age: 53
End: 2023-09-08
Payer: COMMERCIAL

## 2023-09-22 DIAGNOSIS — Z79.4 TYPE 2 DIABETES MELLITUS WITH HYPERGLYCEMIA, WITH LONG-TERM CURRENT USE OF INSULIN: ICD-10-CM

## 2023-09-22 DIAGNOSIS — E11.65 TYPE 2 DIABETES MELLITUS WITH HYPERGLYCEMIA, WITH LONG-TERM CURRENT USE OF INSULIN: ICD-10-CM

## 2023-09-22 RX ORDER — SEMAGLUTIDE 0.68 MG/ML
INJECTION, SOLUTION SUBCUTANEOUS
Qty: 3 ML | Refills: 0 | OUTPATIENT
Start: 2023-09-22

## 2023-09-22 NOTE — TELEPHONE ENCOUNTER
Rx Refill Note  Requested Prescriptions     Pending Prescriptions Disp Refills    Ozempic, 0.25 or 0.5 MG/DOSE, 2 MG/3ML solution pen-injector [Pharmacy Med Name: Ozempic (0.25 or 0.5 MG/DOSE) 2 MG/3ML Subcutaneous Solution Pen-injector] 3 mL 0     Sig: INJECT 0.25 MG SUB Q ONCE WEEKLY.      Last office visit with prescribing clinician: 8/29/2023   Next office visit with prescribing clinician: 9/28/2023                         Would you like a call back once the refill request has been completed: [] Yes [] No    If the office needs to give you a call back, can they leave a voicemail: [] Yes [] No    Kylie Ardon MA  09/22/23, 14:24 CDT

## 2023-10-02 RX ORDER — HYDROGEN PEROXIDE 2.65 ML/100ML
LIQUID ORAL; TOPICAL
Qty: 30 TABLET | Refills: 0 | OUTPATIENT
Start: 2023-10-02

## 2023-10-05 ENCOUNTER — HOSPITAL ENCOUNTER (OUTPATIENT)
Dept: GENERAL RADIOLOGY | Facility: HOSPITAL | Age: 53
Discharge: HOME | End: 2023-10-05
Admitting: PAIN MEDICINE
Payer: COMMERCIAL

## 2023-10-05 ENCOUNTER — TRANSCRIBE ORDERS (OUTPATIENT)
Dept: ADMINISTRATIVE | Facility: HOSPITAL | Age: 53
End: 2023-10-05
Payer: COMMERCIAL

## 2023-10-05 DIAGNOSIS — M17.11 ARTHRITIS OF RIGHT KNEE: ICD-10-CM

## 2023-10-05 DIAGNOSIS — M17.11 ARTHRITIS OF RIGHT KNEE: Primary | ICD-10-CM

## 2023-10-05 PROCEDURE — 73562 X-RAY EXAM OF KNEE 3: CPT

## 2023-10-06 ENCOUNTER — TELEPHONE (OUTPATIENT)
Dept: FAMILY MEDICINE CLINIC | Facility: CLINIC | Age: 53
End: 2023-10-06
Payer: COMMERCIAL

## 2023-10-06 NOTE — TELEPHONE ENCOUNTER
Tali from [facility] in [place] called regarding this pt. She stated that she know that the pt last got [place] from us on 8/30, pt then saw Dr. Nixon for his pain. She stated that Dr. Nixon was going to prescribe the pt Norco but he failed his drug screen. She stated that they informed her that there was meth in his system and they would not be prescribing narcotics.     Tali stated she wanted to let us know.

## 2023-10-19 DIAGNOSIS — I10 ESSENTIAL HYPERTENSION: ICD-10-CM

## 2023-10-19 RX ORDER — CLONIDINE HYDROCHLORIDE 0.1 MG/1
TABLET ORAL
Qty: 180 TABLET | Refills: 0 | Status: SHIPPED | OUTPATIENT
Start: 2023-10-19

## 2023-10-30 DIAGNOSIS — I10 ESSENTIAL HYPERTENSION: ICD-10-CM

## 2023-10-30 NOTE — TELEPHONE ENCOUNTER
Rx Refill Note  Requested Prescriptions     Pending Prescriptions Disp Refills    lisinopril (PRINIVIL,ZESTRIL) 40 MG tablet [Pharmacy Med Name: Lisinopril 40 MG Oral Tablet] 180 tablet 0     Sig: Take 1 tablet by mouth twice daily        Teresa Lawson MA  10/30/23, 10:53 CDT

## 2023-10-31 RX ORDER — ROSUVASTATIN CALCIUM 20 MG/1
TABLET, COATED ORAL
Refills: 0 | OUTPATIENT
Start: 2023-10-31

## 2023-11-01 RX ORDER — LISINOPRIL 40 MG/1
TABLET ORAL
Qty: 60 TABLET | Refills: 0 | Status: SHIPPED | OUTPATIENT
Start: 2023-11-01

## 2023-11-25 DIAGNOSIS — I10 ESSENTIAL HYPERTENSION: ICD-10-CM

## 2023-11-29 RX ORDER — LISINOPRIL 40 MG/1
TABLET ORAL
Qty: 60 TABLET | Refills: 0 | OUTPATIENT
Start: 2023-11-29

## 2023-11-29 NOTE — TELEPHONE ENCOUNTER
Rx Refill Note  Requested Prescriptions     Pending Prescriptions Disp Refills    lisinopril (PRINIVIL,ZESTRIL) 40 MG tablet [Pharmacy Med Name: Lisinopril 40 MG Oral Tablet] 60 tablet 0     Sig: Take 1 tablet by mouth twice daily      Last office visit with prescribing clinician: 8/29/2023   Last telemedicine visit with prescribing clinician: Visit date not found   Next office visit with prescribing clinician: Visit date not found                         Would you like a call back once the refill request has been completed: [] Yes [] No    If the office needs to give you a call back, can they leave a voicemail: [] Yes [] No    Shobha Bundy, PCT  11/29/23, 08:07 CST

## 2025-08-15 ENCOUNTER — TELEPHONE (OUTPATIENT)
Dept: CARDIOLOGY | Facility: CLINIC | Age: 55
End: 2025-08-15
Payer: COMMERCIAL

## (undated) DEVICE — MODEL BT2000 P/N 700287-012KIT CONTENTS: MANIFOLD WITH SALINE AND CONTRAST PORTS, SALINE TUBING WITH SPIKE AND HAND SYRINGE, TRANSDUCER: Brand: BT2000 AUTOMATED MANIFOLD KIT

## (undated) DEVICE — PINNACLE INTRODUCER SHEATH: Brand: PINNACLE

## (undated) DEVICE — SOLIDIFIER LIQUI LOC PLUS 2000CC

## (undated) DEVICE — KT NDL GUIDE STRL 18GA

## (undated) DEVICE — GW STARTER FXD CORE J .035 3X150CM 3MM

## (undated) DEVICE — TOOL INSRT GW MTL OR PLSTC

## (undated) DEVICE — TREK CORONARY DILATATION CATHETER 2.25 MM X 12 MM / RAPID-EXCHANGE: Brand: TREK

## (undated) DEVICE — PK CATH CARD 30 CA/4

## (undated) DEVICE — INFLATION DEVICE: Brand: ENCORE™ 26

## (undated) DEVICE — 6F .070 JL4 100CM: Brand: CORDIS

## (undated) DEVICE — PAD, DEFIB, ADULT, RADIOTRANS, PHYSIO: Brand: MEDLINE

## (undated) DEVICE — MODEL AT P65, P/N 701554-001KIT CONTENTS: HAND CONTROLLER, 3-WAY HIGH-PRESSURE STOPCOCK WITH ROTATING END AND PREMIUM HIGH-PRESSURE TUBING: Brand: ANGIOTOUCH® KIT

## (undated) DEVICE — GW PRESSUREWIRE X WIRELESS FFR 175CM

## (undated) DEVICE — PERCLOSE™ PROSTYLE™ SUTURE-MEDIATED CLOSURE AND REPAIR SYSTEM: Brand: PERCLOSE™ PROSTYLE™

## (undated) DEVICE — GW WHOLEY HITORQ MOD/J .035 175CM

## (undated) DEVICE — SOL IRR NACL 0.9PCT BT 1000ML

## (undated) DEVICE — CANN NASL ETCO2 LO/FLO A/

## (undated) DEVICE — FR5 INFINITI MULTIPAC: Brand: INFINITI